# Patient Record
Sex: FEMALE | Race: WHITE | Employment: FULL TIME | URBAN - METROPOLITAN AREA
[De-identification: names, ages, dates, MRNs, and addresses within clinical notes are randomized per-mention and may not be internally consistent; named-entity substitution may affect disease eponyms.]

---

## 2017-01-30 PROCEDURE — 87086 URINE CULTURE/COLONY COUNT: CPT | Performed by: INTERNAL MEDICINE

## 2017-02-03 RX ORDER — GABAPENTIN 300 MG/1
CAPSULE ORAL
Qty: 120 CAPSULE | Refills: 2 | Status: SHIPPED | OUTPATIENT
Start: 2017-02-03 | End: 2017-05-04

## 2017-02-16 ENCOUNTER — LAB ENCOUNTER (OUTPATIENT)
Dept: LAB | Facility: HOSPITAL | Age: 47
End: 2017-02-16
Attending: INTERNAL MEDICINE
Payer: COMMERCIAL

## 2017-02-16 DIAGNOSIS — N18.30 CHRONIC KIDNEY DISEASE, STAGE III (MODERATE) (HCC): Primary | ICD-10-CM

## 2017-02-16 LAB
25-HYDROXYVITAMIN D (TOTAL): 38.4 NG/ML (ref 30–100)
ALBUMIN SERPL-MCNC: 3.8 G/DL (ref 3.5–4.8)
ALP LIVER SERPL-CCNC: 66 U/L (ref 39–100)
ALT SERPL-CCNC: 33 U/L (ref 14–54)
AST SERPL-CCNC: 27 U/L (ref 15–41)
BASOPHILS # BLD AUTO: 0.06 X10(3) UL (ref 0–0.1)
BASOPHILS NFR BLD AUTO: 1 %
BILIRUB SERPL-MCNC: 1.3 MG/DL (ref 0.1–2)
BUN BLD-MCNC: 34 MG/DL (ref 8–20)
CALCIUM BLD-MCNC: 10 MG/DL (ref 8.3–10.3)
CHLORIDE: 99 MMOL/L (ref 101–111)
CO2: 30 MMOL/L (ref 22–32)
CREAT BLD-MCNC: 1.9 MG/DL (ref 0.55–1.02)
EOSINOPHIL # BLD AUTO: 0.26 X10(3) UL (ref 0–0.3)
EOSINOPHIL NFR BLD AUTO: 4.4 %
ERYTHROCYTE [DISTWIDTH] IN BLOOD BY AUTOMATED COUNT: 15.4 % (ref 11.5–16)
GLUCOSE BLD-MCNC: 202 MG/DL (ref 70–99)
HCT VFR BLD AUTO: 38.2 % (ref 34–50)
HGB BLD-MCNC: 12 G/DL (ref 12–16)
IMMATURE GRANULOCYTE COUNT: 0.12 X10(3) UL (ref 0–1)
IMMATURE GRANULOCYTE RATIO %: 2 %
LYMPHOCYTES # BLD AUTO: 1.05 X10(3) UL (ref 0.9–4)
LYMPHOCYTES NFR BLD AUTO: 17.9 %
M PROTEIN MFR SERPL ELPH: 9 G/DL (ref 6.1–8.3)
MCH RBC QN AUTO: 30.2 PG (ref 27–33.2)
MCHC RBC AUTO-ENTMCNC: 31.4 G/DL (ref 31–37)
MCV RBC AUTO: 96 FL (ref 81–100)
MONOCYTES # BLD AUTO: 0.53 X10(3) UL (ref 0.1–0.6)
MONOCYTES NFR BLD AUTO: 9 %
NEUTROPHIL ABS PRELIM: 3.84 X10 (3) UL (ref 1.3–6.7)
NEUTROPHILS # BLD AUTO: 3.84 X10(3) UL (ref 1.3–6.7)
NEUTROPHILS NFR BLD AUTO: 65.7 %
PHOSPHATE SERPL-MCNC: 3.2 MG/DL (ref 2.5–4.9)
PLATELET # BLD AUTO: 285 10(3)UL (ref 150–450)
POTASSIUM SERPL-SCNC: 4 MMOL/L (ref 3.6–5.1)
PTH-INTACT SERPL-MCNC: 16.9 PG/ML (ref 11.1–79.5)
RBC # BLD AUTO: 3.98 X10(6)UL (ref 3.8–5.1)
RED CELL DISTRIBUTION WIDTH-SD: 53.1 FL (ref 35.1–46.3)
SODIUM SERPL-SCNC: 138 MMOL/L (ref 136–144)
URIC ACID: 5.1 MG/DL (ref 2.4–8)
WBC # BLD AUTO: 5.9 X10(3) UL (ref 4–13)

## 2017-02-16 PROCEDURE — 85025 COMPLETE CBC W/AUTO DIFF WBC: CPT

## 2017-02-16 PROCEDURE — 84550 ASSAY OF BLOOD/URIC ACID: CPT

## 2017-02-16 PROCEDURE — 82330 ASSAY OF CALCIUM: CPT

## 2017-02-16 PROCEDURE — 82306 VITAMIN D 25 HYDROXY: CPT

## 2017-02-16 PROCEDURE — 82652 VIT D 1 25-DIHYDROXY: CPT

## 2017-02-16 PROCEDURE — 83970 ASSAY OF PARATHORMONE: CPT

## 2017-02-16 PROCEDURE — 84100 ASSAY OF PHOSPHORUS: CPT

## 2017-02-16 PROCEDURE — 80053 COMPREHEN METABOLIC PANEL: CPT

## 2017-02-16 PROCEDURE — 36415 COLL VENOUS BLD VENIPUNCTURE: CPT

## 2017-02-18 LAB — 1,25-DIHYDROXYVITAMIN D: 31.2 PG/ML

## 2017-02-19 LAB
CALCIUM IONIZED PH 7.4: 1.37 MMOL/L
CALCIUM IONIZED, SERUM: 1.39 MMOL/L

## 2017-02-27 ENCOUNTER — CHARTING TRANS (OUTPATIENT)
Dept: URGENT CARE | Age: 47
End: 2017-02-27

## 2017-02-27 ASSESSMENT — PAIN SCALES - GENERAL: PAINLEVEL_OUTOF10: 4

## 2017-03-28 PROCEDURE — 82330 ASSAY OF CALCIUM: CPT | Performed by: INTERNAL MEDICINE

## 2017-03-29 PROCEDURE — 87086 URINE CULTURE/COLONY COUNT: CPT | Performed by: PHYSICIAN ASSISTANT

## 2017-04-03 DIAGNOSIS — C50.811 MALIGNANT NEOPLASM OF OVERLAPPING SITES OF RIGHT FEMALE BREAST (HCC): Primary | ICD-10-CM

## 2017-04-03 RX ORDER — EXEMESTANE 25 MG/1
25 TABLET ORAL DAILY
Qty: 90 TABLET | Refills: 3 | Status: SHIPPED | OUTPATIENT
Start: 2017-04-03 | End: 2017-07-20 | Stop reason: ALTCHOICE

## 2017-04-24 ENCOUNTER — OFFICE VISIT (OUTPATIENT)
Dept: HEMATOLOGY/ONCOLOGY | Facility: HOSPITAL | Age: 47
End: 2017-04-24
Attending: INTERNAL MEDICINE
Payer: COMMERCIAL

## 2017-04-24 VITALS
BODY MASS INDEX: 48.82 KG/M2 | WEIGHT: 293 LBS | TEMPERATURE: 99 F | DIASTOLIC BLOOD PRESSURE: 89 MMHG | RESPIRATION RATE: 18 BRPM | OXYGEN SATURATION: 94 % | SYSTOLIC BLOOD PRESSURE: 147 MMHG | HEIGHT: 65 IN | HEART RATE: 101 BPM

## 2017-04-24 DIAGNOSIS — C50.811 MALIGNANT NEOPLASM OF OVERLAPPING SITES OF RIGHT BREAST IN FEMALE, ESTROGEN RECEPTOR POSITIVE (HCC): ICD-10-CM

## 2017-04-24 DIAGNOSIS — R39.9 URINARY TRACT INFECTION SYMPTOMS: Primary | ICD-10-CM

## 2017-04-24 DIAGNOSIS — Z17.0 MALIGNANT NEOPLASM OF OVERLAPPING SITES OF RIGHT BREAST IN FEMALE, ESTROGEN RECEPTOR POSITIVE (HCC): ICD-10-CM

## 2017-04-24 DIAGNOSIS — G62.9 NEUROPATHY: ICD-10-CM

## 2017-04-24 PROCEDURE — 99214 OFFICE O/P EST MOD 30 MIN: CPT | Performed by: INTERNAL MEDICINE

## 2017-04-24 RX ORDER — CIPROFLOXACIN 500 MG/1
500 TABLET, FILM COATED ORAL 2 TIMES DAILY
Qty: 20 TABLET | Refills: 3 | Status: SHIPPED | OUTPATIENT
Start: 2017-04-24 | End: 2017-05-04

## 2017-04-24 NOTE — PROGRESS NOTES
Cancer Center Progress Note    Patient Name: Mulu Alvarez   YOB: 1970   Medical Record Number: [id number]   CSN: [id number]   Attending Physician: Matt Nguyen M.D.    Referring Physician: Elsa Hill MD      Date of Visit: 4/24/2017 METFORMIN  MG Oral Tab, TAKE 1 TABLET BY MOUTH 2 TIMES DAILY. , Disp: 60 tablet, Rfl: 1  •  exemestane 25 MG Oral Tab, Take 1 tablet (25 mg total) by mouth daily. , Disp: 90 tablet, Rfl: 3  •  PIOGLITAZONE HCL 45 MG Oral Tab, TAKE 1 TABLET BY MOUTH ON during Chemo---seeing Dr. Tish Georges   • Anemia of chronic disease    • Diabetes mellitus ([facility] [address]      on meds   • Kidney disease      chronic renal disease, monitored by Dr. Christen Layne [facility])   • Neuropathy [facility])      s/p Chemo---in feet   • Personal history of a Social History Main Topics   Smoking status: Never Smoker     Smokeless tobacco: Never Used    Alcohol Use: Yes  0.0 oz/week    0 Standard drinks or equivalent per week         Comment: occasionally    Drug Use: No    Sexual Activity: Not on file   Not DIGITAL DIAG RIGHT W/ CAD (CPT=/49587), 1/21/2014, 10:23.  BEA LOZANO JORGITO 2D+3D DIAG [facility] W/CAD RIGHT (CPT=/41421/87928), 6/08/2015, 8:28.  BEA LOZANO JORGITO 2D+3D DIAG [facility] W/CAD BILAT (CPT=/95810/54045), 12/22/2014, 8:07.  BEA LOZANO currently in keeping with    history of surgical excision. A postsurgical seroma is seen in that area measuring up to 2.2 x 1.8 x 0.7 cm.      BIRADS Code 2: BENIGN FINDING.             RECOMMENDATIONS:     FOLLOW-UP: BILATERAL BREASTS.  One year mammograph the  prior examination. There are tiny nonobstructing right calyceal calculi. A calculus within the  interpole right kidney measures 1-2 mm (image 84, series 4). A 2 mm calculus is noted within the  lower pole right kidney (image 96).  The previously seen c g/dL 3.5   BREAST CARCINOMA Ag (ZN1604) Latest Ref Range: <38.0 u/mL 20.7        Ref. Range 4/24/2017 10:44   WBC Latest Ref Range: 4.0-13.0 x10(3) uL 5.5   Hemoglobin Latest Ref Range: 12.0-16.0 g/dL 11.4 (L)   Hematocrit Latest Ref Range: 34.0-50.0 % 35. today and discussed whether we should empirically start her on an antibiotic but do want her to touch base with her PCP and urologist and ask for further guidance.  Recurrent UTIs can be a problem with menopause and replacement topical/intravaginal estrogen

## 2017-04-25 ENCOUNTER — TELEPHONE (OUTPATIENT)
Dept: HEMATOLOGY/ONCOLOGY | Facility: HOSPITAL | Age: 47
End: 2017-04-25

## 2017-04-25 RX ORDER — FLUCONAZOLE 100 MG/1
TABLET ORAL
Qty: 8 TABLET | Refills: 0 | Status: SHIPPED | OUTPATIENT
Start: 2017-04-25 | End: 2018-01-17 | Stop reason: ALTCHOICE

## 2017-04-25 RX ORDER — TAMOXIFEN CITRATE 20 MG/1
20 TABLET ORAL DAILY
Qty: 30 TABLET | Refills: 11 | Status: SHIPPED | OUTPATIENT
Start: 2017-04-25 | End: 2018-04-11

## 2017-05-04 RX ORDER — GABAPENTIN 300 MG/1
CAPSULE ORAL
Qty: 120 CAPSULE | Refills: 2 | Status: SHIPPED | OUTPATIENT
Start: 2017-05-04 | End: 2017-07-20 | Stop reason: ALTCHOICE

## 2017-05-10 PROCEDURE — 87086 URINE CULTURE/COLONY COUNT: CPT | Performed by: PHYSICIAN ASSISTANT

## 2017-06-09 PROCEDURE — 81001 URINALYSIS AUTO W/SCOPE: CPT | Performed by: UROLOGY

## 2017-06-09 PROCEDURE — 87086 URINE CULTURE/COLONY COUNT: CPT | Performed by: UROLOGY

## 2017-06-28 PROCEDURE — 87086 URINE CULTURE/COLONY COUNT: CPT | Performed by: UROLOGY

## 2017-08-21 RX ORDER — GABAPENTIN 300 MG/1
CAPSULE ORAL
Qty: 120 CAPSULE | Refills: 2 | Status: SHIPPED | OUTPATIENT
Start: 2017-08-21 | End: 2017-11-10

## 2017-08-23 PROCEDURE — 88108 CYTOPATH CONCENTRATE TECH: CPT | Performed by: UROLOGY

## 2017-08-23 PROCEDURE — 87086 URINE CULTURE/COLONY COUNT: CPT | Performed by: UROLOGY

## 2017-09-19 PROBLEM — S92.351D: Status: ACTIVE | Noted: 2017-09-19

## 2017-10-20 ENCOUNTER — OFFICE VISIT (OUTPATIENT)
Dept: HEMATOLOGY/ONCOLOGY | Facility: HOSPITAL | Age: 47
End: 2017-10-20
Attending: INTERNAL MEDICINE
Payer: COMMERCIAL

## 2017-10-20 VITALS
SYSTOLIC BLOOD PRESSURE: 138 MMHG | RESPIRATION RATE: 20 BRPM | HEART RATE: 108 BPM | BODY MASS INDEX: 48.82 KG/M2 | DIASTOLIC BLOOD PRESSURE: 78 MMHG | OXYGEN SATURATION: 96 % | TEMPERATURE: 98 F | HEIGHT: 65 IN | WEIGHT: 293 LBS

## 2017-10-20 DIAGNOSIS — G62.9 NEUROPATHY: Primary | ICD-10-CM

## 2017-10-20 DIAGNOSIS — D63.1 ANEMIA OF RENAL DISEASE: ICD-10-CM

## 2017-10-20 DIAGNOSIS — C50.811 MALIGNANT NEOPLASM OF OVERLAPPING SITES OF RIGHT BREAST IN FEMALE, ESTROGEN RECEPTOR POSITIVE (HCC): ICD-10-CM

## 2017-10-20 DIAGNOSIS — N18.9 ANEMIA OF RENAL DISEASE: ICD-10-CM

## 2017-10-20 DIAGNOSIS — Z17.0 MALIGNANT NEOPLASM OF OVERLAPPING SITES OF RIGHT BREAST IN FEMALE, ESTROGEN RECEPTOR POSITIVE (HCC): ICD-10-CM

## 2017-10-20 PROCEDURE — 99213 OFFICE O/P EST LOW 20 MIN: CPT | Performed by: INTERNAL MEDICINE

## 2017-10-20 NOTE — PROGRESS NOTES
Cancer Center Progress Note    Patient Name: Norberto Leo   YOB: 1970   Medical Record Number: [id number]   CSN: [id number]   Attending Physician: Marion Feliz M.D.    Referring Physician: Mora Jacobsen MD      Date of Visit: 10/20/2017 change in her bowel habits, headaches, dizziness or visual symptoms.        Current Medications:    Current Outpatient Prescriptions:   •  ALPRAZolam 0.5 MG Oral Tab, Take 1 tablet (0.5 mg total) by mouth daily as needed for Sleep., Disp: 20 tablet, Rfl: 0 daily., Disp: 30 tablet, Rfl: 11  •  fluconazole 100 MG Oral Tab, Two tablets on the first day and then one tablet daily. , Disp: 8 tablet, Rfl: 0  •  allopurinol 100 MG Oral Tab, Take 2 tablets (200 mg total) by mouth once daily. , Disp: 30 tablet, Rfl: 1 CHOLECYSTECTOMY  10/10/16: CYSTOSCOPY,URETEROSCOPY,LITHOTRIPSY Right      Comment: R URS, laser litho, stone extrax, stent, Dr. Fabricio Levin  last was around 2008: LITHOTRIPSY Right      Comment: lithotripsy--x5, Dr. Phyllis Cruz [facility])  2/20/14 sensitivity.   Glipizide               Rash  Levaquin [Levofloxa*    Pain    Comment:Joint pain  Lipitor [Atorvastat*    Myalgia  Pravachol [Pravasta*    Myalgia  Sulfa Antibiotics       Nausea and vomiting  Zocor [Simvastatin]     Myalgia     Review of Sys carcinoma with radiation chemotherapy in February 2014.  No current complaints      VIEWS OBTAINED:  Diagnostic views of both breasts were obtained.     Standard 2D and additional multiplane thin sections of the breasts were obtained for the purpose of Matt patient's information has been entered into a reminder system with a target due date for the next mammogram.              Dictated by: Max Pagan MD on 12/06/2016 at 15:40        Approved by: Max Pagan MD          Laboratory:   Ref.  Range 10/2 0.10 - 0.60 x10(3) uL 0.40   Eosinophils Absolute Latest Ref Range: 0.00 - 0.30 x10(3) uL 0.12   Basophils Absolute Latest Ref Range: 0.00 - 0.10 x10(3) uL 0.02   Immature Granulocyte Absolute Latest Ref Range: 0.00 - 1.00 x10(3) uL 0.03   Neutrophils % La

## 2017-10-23 ENCOUNTER — APPOINTMENT (OUTPATIENT)
Dept: HEMATOLOGY/ONCOLOGY | Facility: HOSPITAL | Age: 47
End: 2017-10-23
Attending: INTERNAL MEDICINE
Payer: COMMERCIAL

## 2017-11-10 RX ORDER — GABAPENTIN 300 MG/1
CAPSULE ORAL
Qty: 120 CAPSULE | Refills: 2 | Status: SHIPPED | OUTPATIENT
Start: 2017-11-10 | End: 2018-02-13

## 2018-01-17 ENCOUNTER — HOSPITAL ENCOUNTER (OUTPATIENT)
Dept: MAMMOGRAPHY | Facility: HOSPITAL | Age: 48
Discharge: HOME | End: 2018-01-17
Attending: INTERNAL MEDICINE
Payer: COMMERCIAL

## 2018-01-17 DIAGNOSIS — C50.811 MALIGNANT NEOPLASM OF OVERLAPPING SITES OF RIGHT BREAST IN FEMALE, ESTROGEN RECEPTOR POSITIVE (HCC): ICD-10-CM

## 2018-01-17 DIAGNOSIS — Z17.0 MALIGNANT NEOPLASM OF OVERLAPPING SITES OF RIGHT BREAST IN FEMALE, ESTROGEN RECEPTOR POSITIVE (HCC): ICD-10-CM

## 2018-01-17 PROBLEM — S92.351D: Status: RESOLVED | Noted: 2017-09-19 | Resolved: 2018-01-17

## 2018-01-17 PROCEDURE — 77066 DX MAMMO INCL CAD BI: CPT | Performed by: INTERNAL MEDICINE

## 2018-01-17 PROCEDURE — 76642 ULTRASOUND BREAST LIMITED: CPT | Performed by: INTERNAL MEDICINE

## 2018-01-17 PROCEDURE — 77062 BREAST TOMOSYNTHESIS BI: CPT | Performed by: INTERNAL MEDICINE

## 2018-02-13 RX ORDER — GABAPENTIN 300 MG/1
CAPSULE ORAL
Qty: 120 CAPSULE | Refills: 2 | Status: SHIPPED | OUTPATIENT
Start: 2018-02-13 | End: 2018-05-12

## 2018-03-21 ENCOUNTER — CHARTING TRANS (OUTPATIENT)
Dept: OTHER | Age: 48
End: 2018-03-21

## 2018-03-21 ASSESSMENT — PAIN SCALES - GENERAL: PAINLEVEL_OUTOF10: 4

## 2018-04-11 RX ORDER — TAMOXIFEN CITRATE 20 MG/1
TABLET ORAL
Qty: 30 TABLET | Refills: 11 | Status: SHIPPED | OUTPATIENT
Start: 2018-04-11 | End: 2019-03-31

## 2018-04-19 ENCOUNTER — OFFICE VISIT (OUTPATIENT)
Dept: HEMATOLOGY/ONCOLOGY | Facility: HOSPITAL | Age: 48
End: 2018-04-19
Attending: INTERNAL MEDICINE
Payer: COMMERCIAL

## 2018-04-19 VITALS
BODY MASS INDEX: 48.82 KG/M2 | RESPIRATION RATE: 20 BRPM | WEIGHT: 293 LBS | HEART RATE: 103 BPM | TEMPERATURE: 98 F | HEIGHT: 65 IN | OXYGEN SATURATION: 97 %

## 2018-04-19 DIAGNOSIS — Z17.0 MALIGNANT NEOPLASM OF OVERLAPPING SITES OF RIGHT BREAST IN FEMALE, ESTROGEN RECEPTOR POSITIVE (HCC): ICD-10-CM

## 2018-04-19 DIAGNOSIS — N95.1 MENOPAUSAL VASOMOTOR SYNDROME: Primary | ICD-10-CM

## 2018-04-19 DIAGNOSIS — C50.811 MALIGNANT NEOPLASM OF OVERLAPPING SITES OF RIGHT BREAST IN FEMALE, ESTROGEN RECEPTOR POSITIVE (HCC): ICD-10-CM

## 2018-04-19 DIAGNOSIS — D63.1 ANEMIA OF RENAL DISEASE: ICD-10-CM

## 2018-04-19 DIAGNOSIS — N18.9 ANEMIA OF RENAL DISEASE: ICD-10-CM

## 2018-04-19 PROCEDURE — 99213 OFFICE O/P EST LOW 20 MIN: CPT | Performed by: INTERNAL MEDICINE

## 2018-04-19 NOTE — PROGRESS NOTES
Cancer Center Progress Note    Patient Name: Almita Larson   YOB: 1970   Medical Record Number: [id number]   CSN: [id number]   Attending Physician: Mago Delarosa M.D.    Referring Physician: Karla Sarmiento MD      Date of Visit: 4/19/2018 TABLET BY MOUTH ONCE DAILY. , Disp: 90 tablet, Rfl: 0  •  JANUVIA 50 MG Oral Tab, TAKE 1 TABLET (50 MG TOTAL) BY MOUTH ONCE DAILY. , Disp: 30 tablet, Rfl: 3  •  hydrochlorothiazide 25 MG Oral Tab, Take 25 mg by mouth once daily. , Disp: , Rfl: 2  •  GABAPENTI 2010-   • Diabetes mellitus ([facility] [address]     on meds   • Elevated serum creatinine     Hold ACE-I during Chemo---seeing Dr. Annabelle Treviño   • Exposure to radiation     last radiation treatment:9/2014   • HYPERLIPIDEMIA     tricor 145 mg was about the same as Tricor 48 History:    Social History  Social History   Marital status:   Spouse name: N/A    Years of education: N/A  Number of children: N/A     Occupational History  None on file     Social History Main Topics   Smoking status: Never Smoker    Smokeless tob (CPT=77066/79433)     COMPARISON:  MARTA , US BREAST LEFT LIMITED ([facility] SAME DAY US)(CPT=76642), 1/17/2018, 15:41. [facility] , [facility] JORGITO 2D+3D DIAG [facility] W/CAD BILAT (CPT=/28139/54573), 12/06/2016, 15:38.      INDICATIONS:  Z17.0 Estrogen receptor positive (H) 11.5 - 16.0 %   RDW-SD 56.1 (H) 35.1 - 46.3 fL   Neutrophil Absolute Prelim 2.52 1.30 - 6.70 x10 (3) uL   Neutrophil Absolute 2.52 1.30 - 6.70 x10(3) uL   Lymphocyte Absolute 0.52 (L) 0.90 - 4.00 x10(3) uL   Monocyte Absolute 0.44 0.10 - 1.00 x10(3) uL

## 2018-04-20 ENCOUNTER — APPOINTMENT (OUTPATIENT)
Dept: HEMATOLOGY/ONCOLOGY | Facility: HOSPITAL | Age: 48
End: 2018-04-20
Attending: INTERNAL MEDICINE
Payer: COMMERCIAL

## 2018-05-14 RX ORDER — GABAPENTIN 300 MG/1
CAPSULE ORAL
Qty: 120 CAPSULE | Refills: 5 | Status: SHIPPED | OUTPATIENT
Start: 2018-05-14 | End: 2018-11-08

## 2018-10-18 ENCOUNTER — OFFICE VISIT (OUTPATIENT)
Dept: HEMATOLOGY/ONCOLOGY | Facility: HOSPITAL | Age: 48
End: 2018-10-18
Attending: INTERNAL MEDICINE
Payer: COMMERCIAL

## 2018-10-18 VITALS
SYSTOLIC BLOOD PRESSURE: 154 MMHG | HEART RATE: 98 BPM | RESPIRATION RATE: 20 BRPM | BODY MASS INDEX: 56 KG/M2 | DIASTOLIC BLOOD PRESSURE: 87 MMHG | OXYGEN SATURATION: 94 % | WEIGHT: 293 LBS

## 2018-10-18 DIAGNOSIS — N18.9 ANEMIA OF RENAL DISEASE: ICD-10-CM

## 2018-10-18 DIAGNOSIS — D69.6 THROMBOCYTOPENIA (HCC): ICD-10-CM

## 2018-10-18 DIAGNOSIS — Z17.0 MALIGNANT NEOPLASM OF OVERLAPPING SITES OF RIGHT BREAST IN FEMALE, ESTROGEN RECEPTOR POSITIVE (HCC): ICD-10-CM

## 2018-10-18 DIAGNOSIS — C50.811 MALIGNANT NEOPLASM OF OVERLAPPING SITES OF RIGHT BREAST IN FEMALE, ESTROGEN RECEPTOR POSITIVE (HCC): ICD-10-CM

## 2018-10-18 DIAGNOSIS — D63.1 ANEMIA OF RENAL DISEASE: ICD-10-CM

## 2018-10-18 DIAGNOSIS — G62.9 NEUROPATHY: Primary | ICD-10-CM

## 2018-10-18 PROCEDURE — 99213 OFFICE O/P EST LOW 20 MIN: CPT | Performed by: INTERNAL MEDICINE

## 2018-10-18 NOTE — PROGRESS NOTES
Cancer Center Progress Note    Patient Name: Jose D Zacarias   YOB: 1970   Medical Record Number: [id number]   CSN: [id number]   Attending Physician: Jeremy Villalobos M.D.    Referring Physician: Costa Zhang MD      Date of Visit: 10/18/2018 tablet, Rfl: 0  •  nystatin [id number] UNIT/GM External Cream, APPLY TO AFFECTED AREA 2-3X/DAY AS NEEDED FOR INFECTION, Disp: 30 g, Rfl: 1  •  METFORMIN  MG Oral Tab, TAKE 1 TABLET BY MOUTH 2 TIMES DAILY. , Disp: 60 tablet, Rfl: 2  •  fluconazole (DIFLUC CYmbalta) 2012--seeing therapist   • DIABETES     diagnosed 2010-   • Diabetes mellitus ([facility] [address]     on meds   • Elevated serum creatinine     Hold ACE-I during Chemo---seeing Dr. Tatum Dawson   • Exposure to radiation     last radiation treatment:9/2014   • HYPER Multiple0  Live Births0     Comment: Menarche: 10-9 y/o  OCP use and fertility use   LMP: 4/21/14 d/t chemo      Psychosocial History:  Social History    Socioeconomic History      Marital status:       Spouse name: Not on file      Number of child intact. PERRL. Oropharynx clear. No thrush  Neck: No JVD. No palpable lymphadenopathy. Neck is supple. Lymphatics: There is no palpable lymphadenopathy   Chest: Clear to auscultation. Heart: Regular rate and rhythm.    Abdomen: Soft, non tender with good mammogram.           Dictated by: Jill Pierre MD on 1/17/2018 at 16:02       Approved by: Jill Pierre MD              Laboratory:  Lab Results   Component Value Date    WBC 5.1 10/18/2018    RBC 3.66 (L) 10/18/2018    HGB 10.8 (L) 10/18/2018    HCT 3 months. Emotional Well Being:  I have assessed the patient's emotional well-being and any concerns about anxiety or depression. We discussed issues of distress, coping difficulties and social support systems. No active problems.      Lisa Pickard MD

## 2018-11-08 RX ORDER — GABAPENTIN 300 MG/1
CAPSULE ORAL
Qty: 120 CAPSULE | Refills: 5 | Status: SHIPPED | OUTPATIENT
Start: 2018-11-08 | End: 2019-05-05

## 2018-11-28 VITALS
OXYGEN SATURATION: 97 % | HEART RATE: 100 BPM | DIASTOLIC BLOOD PRESSURE: 77 MMHG | SYSTOLIC BLOOD PRESSURE: 125 MMHG | RESPIRATION RATE: 20 BRPM | TEMPERATURE: 97 F

## 2018-12-10 DIAGNOSIS — C50.919 BREAST CANCER (HCC): Primary | ICD-10-CM

## 2019-01-18 ENCOUNTER — HOSPITAL ENCOUNTER (OUTPATIENT)
Dept: MAMMOGRAPHY | Facility: HOSPITAL | Age: 49
Discharge: HOME | End: 2019-01-18
Attending: INTERNAL MEDICINE
Payer: COMMERCIAL

## 2019-01-18 DIAGNOSIS — C50.919 BREAST CANCER (HCC): ICD-10-CM

## 2019-01-18 PROCEDURE — 77062 BREAST TOMOSYNTHESIS BI: CPT | Performed by: INTERNAL MEDICINE

## 2019-01-18 PROCEDURE — 77066 DX MAMMO INCL CAD BI: CPT | Performed by: INTERNAL MEDICINE

## 2019-03-06 VITALS
TEMPERATURE: 98.2 F | HEART RATE: 88 BPM | OXYGEN SATURATION: 96 % | RESPIRATION RATE: 20 BRPM | SYSTOLIC BLOOD PRESSURE: 120 MMHG | DIASTOLIC BLOOD PRESSURE: 59 MMHG

## 2019-04-01 RX ORDER — TAMOXIFEN CITRATE 20 MG/1
TABLET ORAL
Qty: 30 TABLET | Refills: 11 | Status: SHIPPED | OUTPATIENT
Start: 2019-04-01 | End: 2020-04-06

## 2019-04-09 PROCEDURE — 87086 URINE CULTURE/COLONY COUNT: CPT | Performed by: PHYSICIAN ASSISTANT

## 2019-04-09 PROCEDURE — 81015 MICROSCOPIC EXAM OF URINE: CPT | Performed by: PHYSICIAN ASSISTANT

## 2019-04-15 ENCOUNTER — OFFICE VISIT (OUTPATIENT)
Dept: HEMATOLOGY/ONCOLOGY | Facility: HOSPITAL | Age: 49
End: 2019-04-15
Attending: INTERNAL MEDICINE
Payer: COMMERCIAL

## 2019-04-15 VITALS
TEMPERATURE: 99 F | BODY MASS INDEX: 48.82 KG/M2 | RESPIRATION RATE: 20 BRPM | OXYGEN SATURATION: 95 % | SYSTOLIC BLOOD PRESSURE: 157 MMHG | HEART RATE: 99 BPM | DIASTOLIC BLOOD PRESSURE: 81 MMHG | WEIGHT: 293 LBS | HEIGHT: 65 IN

## 2019-04-15 DIAGNOSIS — D63.1 ANEMIA OF RENAL DISEASE: Primary | ICD-10-CM

## 2019-04-15 DIAGNOSIS — C50.811 MALIGNANT NEOPLASM OF OVERLAPPING SITES OF RIGHT BREAST IN FEMALE, ESTROGEN RECEPTOR POSITIVE (HCC): ICD-10-CM

## 2019-04-15 DIAGNOSIS — N18.9 ANEMIA OF RENAL DISEASE: Primary | ICD-10-CM

## 2019-04-15 DIAGNOSIS — G62.9 NEUROPATHY: ICD-10-CM

## 2019-04-15 DIAGNOSIS — Z17.0 MALIGNANT NEOPLASM OF OVERLAPPING SITES OF RIGHT BREAST IN FEMALE, ESTROGEN RECEPTOR POSITIVE (HCC): ICD-10-CM

## 2019-04-15 DIAGNOSIS — D69.6 THROMBOCYTOPENIA (HCC): ICD-10-CM

## 2019-04-15 PROCEDURE — 99213 OFFICE O/P EST LOW 20 MIN: CPT | Performed by: INTERNAL MEDICINE

## 2019-04-15 NOTE — PROGRESS NOTES
Cancer Center Progress Note    Patient Name: Norberto Leo   YOB: 1970   Medical Record Number: [id number]   CSN: [id number]   Attending Physician: Marion Feliz M.D.    Referring Physician: Mora Jacobsen MD      Date of Visit: 4/15/2019 Rfl: 11  •  ALPRAZolam 0.5 MG Oral Tab, Take 1 tablet (0.5 mg total) by mouth daily as needed for Sleep., Disp: 30 tablet, Rfl: 0  •  insulin glargine (LANTUS) 100 UNIT/ML Subcutaneous Solution, Inject 40 units at night, Disp: 3 vial, Rfl: 0  •  METFORMIN DIABETES     diagnosed 2010-   • [facility]     on meds   • Elevated serum creatinine     Hold ACE-I during Chemo---seeing Dr. Luis Cruz   • Exposure to radiation     last radiation treatment:9/2014   • HYPERLIPIDEMIA     tricor 145 mg was about [age] y/o  OCP use and fertility use   LMP: 4/21/14 d/t chemo    Psychosocial History:  Social History    Socioeconomic History      Marital status:       Spouse name: Not on file      Number of children: Not on file      Years of education: Not on Comment:Joint pain  Lipitor [Atorvastat*    MYALGIA  Pravachol [Pravasta*    MYALGIA  Sulfa Antibiotics       NAUSEA AND VOMITING  Zocor [Simvastatin]     MYALGIA     Review of Systems:  A 14-point ROS was done with pertinent positives and negative per the COMPOSITION:   Scattered fibroglandular densities (25-50% glandular). FINDINGS:  Post lumpectomy and radiation changes right breast.  Stable parenchymal pattern both breasts.   There are no suspicious calcifications in either breast.  No spiculated mass 0.5 04/15/2019    TP 8.3 (H) 04/15/2019    ALB 3.5 04/15/2019    GLOBULIN 4.8 (H) 04/15/2019    AGRATIO 1.5 09/09/2015     04/15/2019    K 3.9 04/15/2019     04/15/2019    CO2 28.0 04/15/2019       Impression and Plan:  From a malignancy standp

## 2019-05-06 RX ORDER — GABAPENTIN 300 MG/1
CAPSULE ORAL
Qty: 120 CAPSULE | Refills: 5 | Status: SHIPPED | OUTPATIENT
Start: 2019-05-06 | End: 2019-10-23

## 2019-10-14 ENCOUNTER — APPOINTMENT (OUTPATIENT)
Dept: HEMATOLOGY/ONCOLOGY | Facility: HOSPITAL | Age: 49
End: 2019-10-14
Attending: INTERNAL MEDICINE
Payer: COMMERCIAL

## 2019-10-17 ENCOUNTER — OFFICE VISIT (OUTPATIENT)
Dept: HEMATOLOGY/ONCOLOGY | Facility: HOSPITAL | Age: 49
End: 2019-10-17
Attending: INTERNAL MEDICINE
Payer: COMMERCIAL

## 2019-10-17 VITALS
RESPIRATION RATE: 18 BRPM | DIASTOLIC BLOOD PRESSURE: 94 MMHG | SYSTOLIC BLOOD PRESSURE: 178 MMHG | WEIGHT: 293 LBS | TEMPERATURE: 99 F | BODY MASS INDEX: 48.82 KG/M2 | HEART RATE: 105 BPM | HEIGHT: 65 IN | OXYGEN SATURATION: 95 %

## 2019-10-17 DIAGNOSIS — G62.9 NEUROPATHY: ICD-10-CM

## 2019-10-17 DIAGNOSIS — D69.6 THROMBOCYTOPENIA (HCC): ICD-10-CM

## 2019-10-17 DIAGNOSIS — D63.1 ANEMIA OF RENAL DISEASE: Primary | ICD-10-CM

## 2019-10-17 DIAGNOSIS — N18.9 ANEMIA OF RENAL DISEASE: Primary | ICD-10-CM

## 2019-10-17 DIAGNOSIS — Z17.0 MALIGNANT NEOPLASM OF OVERLAPPING SITES OF RIGHT BREAST IN FEMALE, ESTROGEN RECEPTOR POSITIVE (HCC): ICD-10-CM

## 2019-10-17 DIAGNOSIS — D64.9 NORMOCYTIC ANEMIA: ICD-10-CM

## 2019-10-17 DIAGNOSIS — C50.811 MALIGNANT NEOPLASM OF OVERLAPPING SITES OF RIGHT BREAST IN FEMALE, ESTROGEN RECEPTOR POSITIVE (HCC): ICD-10-CM

## 2019-10-17 PROCEDURE — 99214 OFFICE O/P EST MOD 30 MIN: CPT | Performed by: INTERNAL MEDICINE

## 2019-10-17 NOTE — PROGRESS NOTES
Cancer Center Progress Note    Patient Name: Winnie Conley   YOB: 1970   Medical Record Number: [id number]   CSN: [id number]   Attending Physician: Jayashree Velásquez M.D.    Referring Physician: Aneudy Zaragoza MD      Date of Visit: 10/17/2019 USE WITH INSULIN TWICE DAILY, Disp: 200 each, Rfl: 3  •  METFORMIN  MG Oral Tab, TAKE 1 TABLET BY MOUTH TWICE A DAY, Disp: 180 tablet, Rfl: 0  •  ALLOPURINOL 300 MG Oral Tab, TAKE 1 TABLET BY MOUTH EVERY DAY, Disp: 90 tablet, Rfl: 0  •  PIOGLITAZONE therapist   • DIABETES     diagnosed 2010-   • Diabetes mellitus ([facility] [address]     on meds   • Elevated serum creatinine     Hold ACE-I during Chemo---seeing Dr. Siri Joseph   • Exposure to radiation     last radiation treatment:9/2014   • HYPERLIPIDEMIA     tricor 14 Menarche: 6-9 y/o  OCP use and fertility use   LMP: 4/21/14 d/t chemo    Psychosocial History:  Social History    Socioeconomic History      Marital status:       Spouse name: Not on file      Number of children: Not on file      Years of educatio [Levofloxa*    PAIN    Comment:Joint pain  Lipitor [Atorvastat*    MYALGIA  Pravachol [Pravasta*    MYALGIA  Sulfa Antibiotics       NAUSEA AND VOMITING  Zocor [Simvastatin]     MYALGIA     Review of Systems:  A 14-point ROS was done with pertinent positiv parenchymal pattern both breasts. There are no suspicious calcifications in either breast.  No spiculated masses. Stable mammogram.       =====  CONCLUSION:     BIRADS Code 2: BENIGN FINDING. RECOMMENDATIONS:    FOLLOW-UP: BILATERAL BREASTS.  One ye 10/17/2019     10/17/2019    CO2 30.0 10/17/2019     Lab Component   Component Value Date/Time    Breast Carcinoma Ag [id number] 23.6 10/17/2019 1331         Impression and Plan:  From a malignancy standpoint has been doing well and clinically ALLYN.  Most

## 2019-10-17 NOTE — PATIENT INSTRUCTIONS
For triage nurse: [phone] Monday through Friday 7:30-5:00.  *Please note this is a new phone number*    After hours or weekends for emergent needs:  [phone].      To schedule diagnostic testing: Central Scheduling: [place] [phone]

## 2019-10-23 RX ORDER — GABAPENTIN 300 MG/1
CAPSULE ORAL
Qty: 120 CAPSULE | Refills: 5 | Status: SHIPPED | OUTPATIENT
Start: 2019-10-23 | End: 2020-04-13

## 2020-01-01 ENCOUNTER — MED REC SCAN ONLY (OUTPATIENT)
Dept: ORTHOPEDICS CLINIC | Facility: CLINIC | Age: 50
End: 2020-01-01

## 2020-01-16 ENCOUNTER — TELEPHONE (OUTPATIENT)
Dept: ORTHOPEDICS CLINIC | Facility: CLINIC | Age: 50
End: 2020-01-16

## 2020-01-16 ENCOUNTER — HOSPITAL ENCOUNTER (OUTPATIENT)
Dept: GENERAL RADIOLOGY | Facility: HOSPITAL | Age: 50
Discharge: HOME | End: 2020-01-16
Attending: ORTHOPAEDIC SURGERY
Payer: COMMERCIAL

## 2020-01-16 ENCOUNTER — OFFICE VISIT (OUTPATIENT)
Dept: ORTHOPEDICS CLINIC | Facility: CLINIC | Age: 50
End: 2020-01-16
Payer: COMMERCIAL

## 2020-01-16 VITALS
RESPIRATION RATE: 16 BRPM | OXYGEN SATURATION: 98 % | WEIGHT: 293 LBS | HEIGHT: 65 IN | HEART RATE: 89 BPM | BODY MASS INDEX: 48.82 KG/M2

## 2020-01-16 DIAGNOSIS — S82.841A CLOSED BIMALLEOLAR FRACTURE OF RIGHT ANKLE, INITIAL ENCOUNTER: ICD-10-CM

## 2020-01-16 DIAGNOSIS — S82.841A CLOSED BIMALLEOLAR FRACTURE OF RIGHT ANKLE, INITIAL ENCOUNTER: Primary | ICD-10-CM

## 2020-01-16 PROCEDURE — 99203 OFFICE O/P NEW LOW 30 MIN: CPT | Performed by: ORTHOPAEDIC SURGERY

## 2020-01-16 PROCEDURE — 73610 X-RAY EXAM OF ANKLE: CPT | Performed by: ORTHOPAEDIC SURGERY

## 2020-01-16 RX ORDER — CYANOCOBALAMIN (VITAMIN B-12) 1000 MCG
2 TABLET, EXTENDED RELEASE ORAL 2 TIMES DAILY WITH MEALS
Qty: 180 TABLET | Refills: 0 | Status: SHIPPED | OUTPATIENT
Start: 2020-01-16 | End: 2020-03-01

## 2020-01-16 RX ORDER — TRAMADOL HYDROCHLORIDE 50 MG/1
TABLET ORAL EVERY 6 HOURS PRN
Qty: 40 TABLET | Refills: 0 | Status: SHIPPED | OUTPATIENT
Start: 2020-01-16 | End: 2020-01-17

## 2020-01-16 NOTE — H&P
[facility] New Patient Note    CC: Patient presents with:  Consult: right ankle injury. DOI: 12/31/19-  missed going down steps. was seen at ER in [address]. took norco and ended up with a kidney infection. CAM boot. swelling.   pain gets better as the on meds   • Elevated serum creatinine     Hold ACE-I during Chemo---seeing Dr. Tesha Johnson   • Exposure to radiation     last radiation treatment:9/2014   • HYPERLIPIDEMIA     tricor 145 mg was about the same as Tricor 48 mg   • HYPERTENSION    • Kidney diseas DAILY.  90 tablet 0   • METFORMIN  MG Oral Tab TAKE 1 TABLET BY MOUTH TWICE A  tablet 0   • ALLOPURINOL 300 MG Oral Tab TAKE 1 TABLET BY MOUTH EVERY DAY 90 tablet 0   • GABAPENTIN 300 MG Oral Cap TAKE 2 CAPSULES BY MOUTH TWICE A  capsul pain  Lipitor [Atorvastat*    MYALGIA  Pravachol [Pravasta*    MYALGIA  Sulfa Antibiotics       NAUSEA AND VOMITING  Zocor [Simvastatin]     MYALGIA  Cefdinir                DIARRHEA    Comment:abd pain and dizziness  Family History   Problem Relation Age views. Appears to be tension type failure of both the medial lateral malleoli, with a lateral malleolus fracture below the level of the ankle joint/Wilson a.       Assessment/Plan:  Diagnoses and all orders for this visit:    Closed bimalleolar fracture of she has remained nondisplaced with this immobilization since injury 2-1/2 weeks ago. I did recommend close follow-up to ensure no further displacement. We did order x-rays to be performed when she returns in 1 week.   Additionally, for pain control, maeve

## 2020-01-17 RX ORDER — HYDROCODONE BITARTRATE AND ACETAMINOPHEN 5; 325 MG/1; MG/1
1-2 TABLET ORAL EVERY 6 HOURS PRN
Qty: 20 TABLET | Refills: 0 | Status: SHIPPED | OUTPATIENT
Start: 2020-01-17 | End: 2020-10-19

## 2020-01-23 ENCOUNTER — HOSPITAL ENCOUNTER (OUTPATIENT)
Dept: GENERAL RADIOLOGY | Facility: HOSPITAL | Age: 50
Discharge: HOME | End: 2020-01-23
Attending: ORTHOPAEDIC SURGERY
Payer: COMMERCIAL

## 2020-01-23 ENCOUNTER — OFFICE VISIT (OUTPATIENT)
Dept: ORTHOPEDICS CLINIC | Facility: CLINIC | Age: 50
End: 2020-01-23
Payer: COMMERCIAL

## 2020-01-23 VITALS
RESPIRATION RATE: 16 BRPM | BODY MASS INDEX: 48.82 KG/M2 | HEART RATE: 104 BPM | HEIGHT: 65 IN | OXYGEN SATURATION: 99 % | WEIGHT: 293 LBS

## 2020-01-23 DIAGNOSIS — S82.841A CLOSED BIMALLEOLAR FRACTURE OF RIGHT ANKLE, INITIAL ENCOUNTER: ICD-10-CM

## 2020-01-23 DIAGNOSIS — S82.841D CLOSED BIMALLEOLAR FRACTURE OF RIGHT ANKLE WITH ROUTINE HEALING, SUBSEQUENT ENCOUNTER: Primary | ICD-10-CM

## 2020-01-23 PROCEDURE — 73610 X-RAY EXAM OF ANKLE: CPT | Performed by: ORTHOPAEDIC SURGERY

## 2020-01-23 PROCEDURE — 99212 OFFICE O/P EST SF 10 MIN: CPT | Performed by: ORTHOPAEDIC SURGERY

## 2020-01-23 NOTE — PROGRESS NOTES
[facility] Progress Note    Subjective: Patient returns for repeat x-rays of the right ankle. She does report that her pain is gotten better. She is not taking tramadol as it was not covered by insurance.   She does have Norco, but takes this at mos

## 2020-02-21 ENCOUNTER — HOSPITAL ENCOUNTER (OUTPATIENT)
Dept: GENERAL RADIOLOGY | Facility: HOSPITAL | Age: 50
Discharge: HOME | End: 2020-02-21
Attending: ORTHOPAEDIC SURGERY
Payer: COMMERCIAL

## 2020-02-21 ENCOUNTER — OFFICE VISIT (OUTPATIENT)
Dept: ORTHOPEDICS CLINIC | Facility: CLINIC | Age: 50
End: 2020-02-21
Payer: COMMERCIAL

## 2020-02-21 DIAGNOSIS — S82.841D CLOSED BIMALLEOLAR FRACTURE OF RIGHT ANKLE WITH ROUTINE HEALING, SUBSEQUENT ENCOUNTER: ICD-10-CM

## 2020-02-21 DIAGNOSIS — S82.841D CLOSED BIMALLEOLAR FRACTURE OF RIGHT ANKLE WITH ROUTINE HEALING, SUBSEQUENT ENCOUNTER: Primary | ICD-10-CM

## 2020-02-21 PROCEDURE — 73610 X-RAY EXAM OF ANKLE: CPT | Performed by: ORTHOPAEDIC SURGERY

## 2020-02-21 PROCEDURE — 99213 OFFICE O/P EST LOW 20 MIN: CPT | Performed by: ORTHOPAEDIC SURGERY

## 2020-02-21 NOTE — PROGRESS NOTES
[facility] Progress Note    Subjective: Patient returns 7 weeks post injury from closed bimalleolar right ankle fracture. She reports she is not having pain. She is wearing the boot pretty much all the time.   She states she is trying to remain nonw Surgery

## 2020-03-25 ENCOUNTER — HOSPITAL ENCOUNTER (OUTPATIENT)
Dept: GENERAL RADIOLOGY | Age: 50
Discharge: HOME | End: 2020-03-25
Attending: ORTHOPAEDIC SURGERY
Payer: COMMERCIAL

## 2020-03-25 DIAGNOSIS — S82.841D CLOSED BIMALLEOLAR FRACTURE OF RIGHT ANKLE WITH ROUTINE HEALING, SUBSEQUENT ENCOUNTER: ICD-10-CM

## 2020-03-25 PROCEDURE — 73610 X-RAY EXAM OF ANKLE: CPT | Performed by: ORTHOPAEDIC SURGERY

## 2020-03-27 ENCOUNTER — TELEPHONE (OUTPATIENT)
Dept: ORTHOPEDICS CLINIC | Facility: CLINIC | Age: 50
End: 2020-03-27

## 2020-03-27 DIAGNOSIS — S82.891G CLOSED FRACTURE OF RIGHT ANKLE WITH DELAYED HEALING, SUBSEQUENT ENCOUNTER: Primary | ICD-10-CM

## 2020-03-27 NOTE — TELEPHONE ENCOUNTER
Patient had an appointment today and had her x-ray yesterday.  Please call after 1:30 pm today Friday 3.39

## 2020-03-27 NOTE — TELEPHONE ENCOUNTER
Called patient to discuss ankle x-rays. Advised that we obtain stress views of ankle to assess stability of tibiotalar joint and syndesmosis. She expressed understanding. X-ray order placed, will attempt to schedule sometime for next week.

## 2020-04-01 ENCOUNTER — HOSPITAL ENCOUNTER (OUTPATIENT)
Dept: GENERAL RADIOLOGY | Facility: HOSPITAL | Age: 50
Discharge: HOME | End: 2020-04-01
Attending: ORTHOPAEDIC SURGERY
Payer: COMMERCIAL

## 2020-04-01 ENCOUNTER — TELEPHONE (OUTPATIENT)
Dept: ORTHOPEDICS CLINIC | Facility: CLINIC | Age: 50
End: 2020-04-01

## 2020-04-01 DIAGNOSIS — S82.891G CLOSED FRACTURE OF RIGHT ANKLE WITH DELAYED HEALING, SUBSEQUENT ENCOUNTER: ICD-10-CM

## 2020-04-01 PROCEDURE — 73610 X-RAY EXAM OF ANKLE: CPT | Performed by: ORTHOPAEDIC SURGERY

## 2020-04-01 NOTE — TELEPHONE ENCOUNTER
Assisted patient with ankle x-rays today. Weight bearing mortise, lateral, and internal/external stress views demonstrate stable tibiotalar joint.  Recommend initiating weight bearing in boot, working on ROM/strengthening, gradual weaning out of boot as str

## 2020-04-06 RX ORDER — TAMOXIFEN CITRATE 20 MG/1
TABLET ORAL
Qty: 30 TABLET | Refills: 11 | Status: SHIPPED | OUTPATIENT
Start: 2020-04-06 | End: 2021-04-11

## 2020-04-13 RX ORDER — GABAPENTIN 300 MG/1
600 CAPSULE ORAL 2 TIMES DAILY
Qty: 120 CAPSULE | Refills: 5 | Status: SHIPPED | OUTPATIENT
Start: 2020-04-13 | End: 2020-11-03

## 2020-04-16 ENCOUNTER — APPOINTMENT (OUTPATIENT)
Dept: HEMATOLOGY/ONCOLOGY | Facility: HOSPITAL | Age: 50
End: 2020-04-16
Attending: INTERNAL MEDICINE
Payer: COMMERCIAL

## 2020-05-01 ENCOUNTER — TELEPHONE (OUTPATIENT)
Dept: ORTHOPEDICS CLINIC | Facility: CLINIC | Age: 50
End: 2020-05-01

## 2020-05-01 DIAGNOSIS — S82.891G CLOSED FRACTURE OF RIGHT ANKLE WITH DELAYED HEALING, SUBSEQUENT ENCOUNTER: Primary | ICD-10-CM

## 2020-05-01 NOTE — TELEPHONE ENCOUNTER
Called patient to assess her ankle symptoms. She reports that she has been walking without the boot for the past 2 weeks.   She states that she is not having pain, just swelling, which is worse with leaving the foot and ankle down, as well as at the end of

## 2020-07-16 ENCOUNTER — APPOINTMENT (OUTPATIENT)
Dept: HEMATOLOGY/ONCOLOGY | Facility: HOSPITAL | Age: 50
End: 2020-07-16
Attending: INTERNAL MEDICINE
Payer: COMMERCIAL

## 2020-09-27 ENCOUNTER — HOSPITAL ENCOUNTER (OUTPATIENT)
Age: 50
Discharge: ANOTHER HOSPITAL | End: 2020-09-27
Payer: COMMERCIAL

## 2020-09-27 VITALS
SYSTOLIC BLOOD PRESSURE: 123 MMHG | DIASTOLIC BLOOD PRESSURE: 62 MMHG | TEMPERATURE: 98 F | HEART RATE: 105 BPM | RESPIRATION RATE: 16 BRPM | OXYGEN SATURATION: 96 %

## 2020-09-27 DIAGNOSIS — R09.02 HYPOXIA: Primary | ICD-10-CM

## 2020-09-27 PROCEDURE — 99202 OFFICE O/P NEW SF 15 MIN: CPT | Performed by: PHYSICIAN ASSISTANT

## 2020-09-27 NOTE — ED PROVIDER NOTES
Patient Seen in: [address] [facility] [place], [place]      History   Patient presents with:  Runny Nose  Shortness Of Breath  Headache    Stated Complaint: Sinus Pain and Headache    HPI    54-year-old female.   Moderate medical history including breast cancer MDM        Upon initial evaluation the patient was noted to vacillate between 78 and 82% on room air. Supplemental oxygen was immediately administered. At 4 L, 93% room air. Emergent transport via ambulance to the emergency department.   Cece Núñez

## 2020-10-19 ENCOUNTER — OFFICE VISIT (OUTPATIENT)
Dept: HEMATOLOGY/ONCOLOGY | Facility: HOSPITAL | Age: 50
End: 2020-10-19
Attending: INTERNAL MEDICINE
Payer: COMMERCIAL

## 2020-10-19 VITALS
RESPIRATION RATE: 16 BRPM | SYSTOLIC BLOOD PRESSURE: 116 MMHG | DIASTOLIC BLOOD PRESSURE: 54 MMHG | OXYGEN SATURATION: 100 % | HEART RATE: 96 BPM | TEMPERATURE: 98 F

## 2020-10-19 DIAGNOSIS — D64.9 NORMOCHROMIC NORMOCYTIC ANEMIA: ICD-10-CM

## 2020-10-19 DIAGNOSIS — G62.9 NEUROPATHY: ICD-10-CM

## 2020-10-19 DIAGNOSIS — C50.811 MALIGNANT NEOPLASM OF OVERLAPPING SITES OF RIGHT BREAST IN FEMALE, ESTROGEN RECEPTOR POSITIVE (HCC): ICD-10-CM

## 2020-10-19 DIAGNOSIS — Z17.0 MALIGNANT NEOPLASM OF OVERLAPPING SITES OF RIGHT BREAST IN FEMALE, ESTROGEN RECEPTOR POSITIVE (HCC): ICD-10-CM

## 2020-10-19 DIAGNOSIS — N18.9 ANEMIA OF RENAL DISEASE: ICD-10-CM

## 2020-10-19 DIAGNOSIS — R06.02 SHORTNESS OF BREATH: Primary | ICD-10-CM

## 2020-10-19 DIAGNOSIS — D63.8 ANEMIA OF CHRONIC DISEASE: ICD-10-CM

## 2020-10-19 DIAGNOSIS — E66.01 MORBID OBESITY (HCC): ICD-10-CM

## 2020-10-19 DIAGNOSIS — R06.02 SOB (SHORTNESS OF BREATH): ICD-10-CM

## 2020-10-19 DIAGNOSIS — D63.1 ANEMIA OF RENAL DISEASE: ICD-10-CM

## 2020-10-19 DIAGNOSIS — R16.1 SPLENOMEGALY: ICD-10-CM

## 2020-10-19 PROCEDURE — 99215 OFFICE O/P EST HI 40 MIN: CPT | Performed by: INTERNAL MEDICINE

## 2020-10-19 NOTE — PROGRESS NOTES
Cancer Center Progress Note    Patient Name: Almita Larson   YOB: 1970   Medical Record Number: [id number]   CSN: [id number]   Attending Physician: Mago Delarosa M.D.    Referring Physician: Karla Sarmiento MD      Date of Visit: 10/19/2020 yeast infection and oral thrush. Saw her PCP and was placed on diflucan for 2 weeks. BP has not been very well controlled and medications were changed which she did not tolerate due to SE.  Ultimately stopped BB on her own due to side effects and felt (BENICAR) 20 MG Oral Tab, Take 1 tablet (20 mg total) by mouth daily. , Disp: 30 tablet, Rfl: 0  •  insulin glargine 100 UNIT/ML Subcutaneous Solution, Inject into the skin nightly., Disp: , Rfl:   •  PIOGLITAZONE HCL 45 MG Oral Tab, TAKE 1 TABLET (45 MG TO route daily. , Disp: 1 Box, Rfl: 2  •  Aspirin-Acetaminophen-Caffeine (EXCEDRIN MIGRAINE OR), Take 1 tablet by mouth daily as needed. , Disp: , Rfl:   •  Cetirizine HCl (ZYRTEC) 10 MG Oral Chew Tab, Chew 10 mg by mouth daily. , Disp: , Rfl:     Past Medical History:  Family History   Problem Relation Age of Onset   • Other (Other) Father         Lupus-53   • Diabetes Mother    • Hypertension Mother    • Other (Other) Sister         Depression   • Cancer Paternal Grandmother         thyroid cancer   • Cancer M file        Emotionally abused: Not on file        Physically abused: Not on file        Forced sexual activity: Not on file    Other Topics      Concerns:        Not on file    Social History Narrative      , no children, works in [employer]    [phone]    Exam Date:                    09/29/2020 11:02    [facility]                       [id number]    Ordering Physician:           James Solis     Attending Physician:         Jaxson Zheng MD     CC Physician:    Technologist:                     COMMENTS:    Left Ventricle:    Contrast enhancement was used to opacify and improve visualization of     the left ventricular chamber.  Normal left ventricular size and     systolic function, EF estimated at >55%.  No regional wall motion     abnor unable to be accurately assessed due to poor     Doppler envelope.       Sofiya Snyder MD.      (Electronically Signed)      Final            30 September 2020     Date:            09:27    Other Result Information   Epic, Interface Incoming Radiology Re Velocity           474 cm/s        MR Peak Gradient           90 mmHg         Mitral E Point Velocity    119 cm/s        Mitral A Point Velocity    111 cm/s        Mitral E to A Ratio        1.1             MV Deceleration Time       169 ms          LV E' Trivial, nonhemodynamically significant, pericardial effusion. CONCLUSIONS:    Contrast enhancement was used to opacify and improve visualization of     the left ventricular chamber.  Normal left ventricular size and     systolic function, EF estima stasis. The appendix is not clearly identified and there are no inflammatory changes in the right lower quadrant. There are diffuse spondylitic changes of the thoracolumbar spine.    Other Result Information   Epic, Interface Incoming Radiology Results - Doppler. History: 26-year-old female with bilateral lower extremity pain and swelling. Technique: Real-time sonographic images of the bilateral  lower extremity venous system were obtained.  Color Doppler sonography and spectral  waveform analysis wer multiplane thin sections of the breasts were obtained for the purpose of Tomosynthesis evaluation. The images were reconstructed and reviewed on the dedicated Tomosynthesis workstation.      BREAST COMPOSITION:   Scattered fibroglandular densities (25-50% slightly progressed when compared with the prior examination. Findings may reflect worsening pulmonary edema versus multifocal pneumonia. Heart and mediastinum are stable. No pneumothorax or large pleural effusion. Bony structures are intact.     IMPRESS Impression and Plan:  1. Anemia- normocytic, normochromic. Has had mild anemia previously felt to be from CKD. Acute drop likely from acute illness- ARF on CKD, infection. Will continue on oral iron for now. W/u as ordered.  Curiously her scans done

## 2020-10-20 ENCOUNTER — HOSPITAL ENCOUNTER (OUTPATIENT)
Dept: GENERAL RADIOLOGY | Facility: HOSPITAL | Age: 50
Discharge: HOME | End: 2020-10-20
Attending: INTERNAL MEDICINE
Payer: COMMERCIAL

## 2020-10-20 ENCOUNTER — APPOINTMENT (OUTPATIENT)
Dept: LAB | Age: 50
End: 2020-10-20
Attending: INTERNAL MEDICINE
Payer: COMMERCIAL

## 2020-10-20 ENCOUNTER — TELEPHONE (OUTPATIENT)
Dept: HEMATOLOGY/ONCOLOGY | Facility: HOSPITAL | Age: 50
End: 2020-10-20

## 2020-10-20 ENCOUNTER — HOSPITAL ENCOUNTER (OUTPATIENT)
Dept: NUCLEAR MEDICINE | Facility: HOSPITAL | Age: 50
Discharge: HOME | End: 2020-10-20
Attending: INTERNAL MEDICINE
Payer: COMMERCIAL

## 2020-10-20 DIAGNOSIS — R06.02 SHORTNESS OF BREATH: ICD-10-CM

## 2020-10-20 DIAGNOSIS — Z01.818 PREOP EXAMINATION: ICD-10-CM

## 2020-10-20 DIAGNOSIS — Z11.59 ENCOUNTER FOR SCREENING FOR OTHER VIRAL DISEASES: ICD-10-CM

## 2020-10-20 DIAGNOSIS — J18.9 PNEUMONIA DUE TO INFECTIOUS ORGANISM, UNSPECIFIED LATERALITY, UNSPECIFIED PART OF LUNG: ICD-10-CM

## 2020-10-20 PROCEDURE — 78580 LUNG PERFUSION IMAGING: CPT | Performed by: INTERNAL MEDICINE

## 2020-10-20 PROCEDURE — 71046 X-RAY EXAM CHEST 2 VIEWS: CPT | Performed by: INTERNAL MEDICINE

## 2020-10-20 RX ORDER — FOLIC ACID 1 MG/1
1 TABLET ORAL DAILY
Qty: 30 TABLET | Refills: 11 | Status: SHIPPED | OUTPATIENT
Start: 2020-10-20 | End: 2021-10-04

## 2020-10-20 NOTE — TELEPHONE ENCOUNTER
Called patient but went to voicemail. Left message regarding lab results we have so far and V/Q scan. Adviced her to schedule for IV iron and to start folic acid. Results also released in [address]

## 2020-10-21 NOTE — PROGRESS NOTES
Patient reviewed MyChart results and MD message. Nothing further needed from MD or nurse. Closing encounter.

## 2020-10-22 ENCOUNTER — TELEPHONE (OUTPATIENT)
Dept: HEMATOLOGY/ONCOLOGY | Facility: HOSPITAL | Age: 50
End: 2020-10-22

## 2020-10-22 PROBLEM — E53.8 B12 DEFICIENCY: Status: ACTIVE | Noted: 2020-10-22

## 2020-10-22 RX ORDER — CYANOCOBALAMIN 1000 UG/ML
1000 INJECTION INTRAMUSCULAR; SUBCUTANEOUS ONCE
Status: CANCELLED
Start: 2020-10-22 | End: 2020-10-22

## 2020-10-22 NOTE — TELEPHONE ENCOUNTER
Baylee Moran, RONEN  P Edw Bcn Erika Matthew Rns             I sent a My Chart message, but if you can call her she needs B 12 injections weekly x4 then monthly   Thanks   Bry Stout LVM with above.  Will add inj apt to infusion apt, can set up rest of inj apts

## 2020-10-26 ENCOUNTER — OFFICE VISIT (OUTPATIENT)
Dept: HEMATOLOGY/ONCOLOGY | Facility: HOSPITAL | Age: 50
End: 2020-10-26
Attending: INTERNAL MEDICINE
Payer: COMMERCIAL

## 2020-10-26 VITALS
HEIGHT: 65 IN | WEIGHT: 293 LBS | SYSTOLIC BLOOD PRESSURE: 159 MMHG | TEMPERATURE: 97 F | BODY MASS INDEX: 48.82 KG/M2 | HEART RATE: 92 BPM | DIASTOLIC BLOOD PRESSURE: 83 MMHG | RESPIRATION RATE: 18 BRPM | OXYGEN SATURATION: 99 %

## 2020-10-26 DIAGNOSIS — R79.89 ELEVATED SERUM CREATININE: ICD-10-CM

## 2020-10-26 DIAGNOSIS — E53.8 B12 DEFICIENCY: Primary | ICD-10-CM

## 2020-10-26 DIAGNOSIS — E61.1 IRON DEFICIENCY: ICD-10-CM

## 2020-10-26 PROCEDURE — 96376 TX/PRO/DX INJ SAME DRUG ADON: CPT

## 2020-10-26 PROCEDURE — 96372 THER/PROPH/DIAG INJ SC/IM: CPT

## 2020-10-26 PROCEDURE — 36415 COLL VENOUS BLD VENIPUNCTURE: CPT

## 2020-10-26 PROCEDURE — 96365 THER/PROPH/DIAG IV INF INIT: CPT

## 2020-10-26 PROCEDURE — 80053 COMPREHEN METABOLIC PANEL: CPT

## 2020-10-26 RX ORDER — CYANOCOBALAMIN 1000 UG/ML
1000 INJECTION INTRAMUSCULAR; SUBCUTANEOUS ONCE
Status: CANCELLED
Start: 2020-11-02 | End: 2020-11-02

## 2020-10-26 RX ORDER — CYANOCOBALAMIN 1000 UG/ML
1000 INJECTION INTRAMUSCULAR; SUBCUTANEOUS ONCE
Status: COMPLETED | OUTPATIENT
Start: 2020-10-26 | End: 2020-10-26

## 2020-10-26 RX ADMIN — CYANOCOBALAMIN 1000 MCG: 1000 INJECTION INTRAMUSCULAR; SUBCUTANEOUS at 15:33:00

## 2020-10-26 NOTE — PROGRESS NOTES
Pt. Tolerated infed test dose and infusion without incident. Post vitals- 131/79, 64, 18, 98.6. Will return as scheduled next Friday for b12 injection.

## 2020-10-26 NOTE — PROGRESS NOTES
[facility] Infusion Education Overview    Learner:  Patient    Learner's Barriers / Limitations of Education:  None    Medication:  Infed, B12       Most Common Side Effects:   Allergic reaction- possible upset stomach      Frequency: per md

## 2020-10-27 NOTE — PROGRESS NOTES
Please call Marcia--have her repeat kidney function in 1 week---as appears she is dry--meaning that's driving up her creatinine  -make sure she is drinking enough water before retaking the test-  -liver function is fine  -potassium is good

## 2020-11-03 DIAGNOSIS — G62.9 NEUROPATHY: Primary | ICD-10-CM

## 2020-11-03 RX ORDER — GABAPENTIN 300 MG/1
600 CAPSULE ORAL 2 TIMES DAILY
Qty: 120 CAPSULE | Refills: 5 | Status: SHIPPED | OUTPATIENT
Start: 2020-11-03 | End: 2021-04-26

## 2020-11-06 ENCOUNTER — OFFICE VISIT (OUTPATIENT)
Dept: HEMATOLOGY/ONCOLOGY | Facility: HOSPITAL | Age: 50
End: 2020-11-06
Attending: INTERNAL MEDICINE
Payer: COMMERCIAL

## 2020-11-06 DIAGNOSIS — E53.8 B12 DEFICIENCY: Primary | ICD-10-CM

## 2020-11-06 DIAGNOSIS — E61.1 IRON DEFICIENCY: ICD-10-CM

## 2020-11-06 PROCEDURE — 96372 THER/PROPH/DIAG INJ SC/IM: CPT

## 2020-11-06 RX ORDER — CYANOCOBALAMIN 1000 UG/ML
1000 INJECTION INTRAMUSCULAR; SUBCUTANEOUS ONCE
Status: CANCELLED
Start: 2020-11-09 | End: 2020-11-09

## 2020-11-06 RX ORDER — CYANOCOBALAMIN 1000 UG/ML
1000 INJECTION INTRAMUSCULAR; SUBCUTANEOUS ONCE
Status: COMPLETED | OUTPATIENT
Start: 2020-11-06 | End: 2020-11-06

## 2020-11-06 RX ADMIN — CYANOCOBALAMIN 1000 MCG: 1000 INJECTION INTRAMUSCULAR; SUBCUTANEOUS at 16:12:00

## 2020-11-06 NOTE — PROGRESS NOTES
Education Record    Learner:  Patient    Disease / Diagnosis: Vit b12 deficiency    Barriers / Limitations:  None   Comments:    Method:  Brief focused   Comments:    General Topics:  Plan of care reviewed   Comments:    Outcome:  Shows understanding   Com

## 2020-11-13 ENCOUNTER — OFFICE VISIT (OUTPATIENT)
Dept: HEMATOLOGY/ONCOLOGY | Facility: HOSPITAL | Age: 50
End: 2020-11-13
Attending: INTERNAL MEDICINE
Payer: COMMERCIAL

## 2020-11-13 DIAGNOSIS — E53.8 B12 DEFICIENCY: Primary | ICD-10-CM

## 2020-11-13 DIAGNOSIS — E61.1 IRON DEFICIENCY: ICD-10-CM

## 2020-11-13 PROCEDURE — 96372 THER/PROPH/DIAG INJ SC/IM: CPT

## 2020-11-13 RX ORDER — CYANOCOBALAMIN 1000 UG/ML
1000 INJECTION INTRAMUSCULAR; SUBCUTANEOUS ONCE
Status: CANCELLED
Start: 2020-11-20 | End: 2020-11-20

## 2020-11-13 RX ORDER — CYANOCOBALAMIN 1000 UG/ML
1000 INJECTION INTRAMUSCULAR; SUBCUTANEOUS ONCE
Status: COMPLETED | OUTPATIENT
Start: 2020-11-13 | End: 2020-11-13

## 2020-11-13 RX ADMIN — CYANOCOBALAMIN 1000 MCG: 1000 INJECTION INTRAMUSCULAR; SUBCUTANEOUS at 16:06:00

## 2020-11-20 ENCOUNTER — OFFICE VISIT (OUTPATIENT)
Dept: HEMATOLOGY/ONCOLOGY | Facility: HOSPITAL | Age: 50
End: 2020-11-20
Attending: INTERNAL MEDICINE
Payer: COMMERCIAL

## 2020-11-20 DIAGNOSIS — E53.8 B12 DEFICIENCY: Primary | ICD-10-CM

## 2020-11-20 DIAGNOSIS — D63.1 ANEMIA OF RENAL DISEASE: ICD-10-CM

## 2020-11-20 DIAGNOSIS — N18.9 ANEMIA OF RENAL DISEASE: ICD-10-CM

## 2020-11-20 DIAGNOSIS — E61.1 IRON DEFICIENCY: ICD-10-CM

## 2020-11-20 PROCEDURE — 83550 IRON BINDING TEST: CPT

## 2020-11-20 PROCEDURE — 84238 ASSAY NONENDOCRINE RECEPTOR: CPT

## 2020-11-20 PROCEDURE — 82746 ASSAY OF FOLIC ACID SERUM: CPT

## 2020-11-20 PROCEDURE — 85025 COMPLETE CBC W/AUTO DIFF WBC: CPT

## 2020-11-20 PROCEDURE — 83921 ORGANIC ACID SINGLE QUANT: CPT

## 2020-11-20 PROCEDURE — 82607 VITAMIN B-12: CPT

## 2020-11-20 PROCEDURE — 82728 ASSAY OF FERRITIN: CPT

## 2020-11-20 PROCEDURE — 96372 THER/PROPH/DIAG INJ SC/IM: CPT

## 2020-11-20 PROCEDURE — 83090 ASSAY OF HOMOCYSTEINE: CPT

## 2020-11-20 PROCEDURE — 83540 ASSAY OF IRON: CPT

## 2020-11-20 PROCEDURE — 36415 COLL VENOUS BLD VENIPUNCTURE: CPT

## 2020-11-20 RX ORDER — CYANOCOBALAMIN 1000 UG/ML
1000 INJECTION INTRAMUSCULAR; SUBCUTANEOUS ONCE
Status: COMPLETED | OUTPATIENT
Start: 2020-11-20 | End: 2020-11-20

## 2020-11-20 RX ORDER — CYANOCOBALAMIN 1000 UG/ML
1000 INJECTION INTRAMUSCULAR; SUBCUTANEOUS ONCE
Status: CANCELLED
Start: 2020-11-20 | End: 2020-11-20

## 2020-11-20 RX ADMIN — CYANOCOBALAMIN 1000 MCG: 1000 INJECTION INTRAMUSCULAR; SUBCUTANEOUS at 16:24:00

## 2020-11-20 NOTE — PROGRESS NOTES
Education Record    Learner:  Patient    Disease / Diagnosis: B12 deficiency - vitamin b12 injection    Barriers / Limitations:  None   Comments:    Method:  Brief focused and Reinforcement   Comments:    General Topics:  Plan of care reviewed   Comments:

## 2020-12-17 RX ORDER — CYANOCOBALAMIN 1000 UG/ML
1000 INJECTION INTRAMUSCULAR; SUBCUTANEOUS ONCE
Status: CANCELLED
Start: 2020-12-17 | End: 2020-12-17

## 2020-12-18 ENCOUNTER — OFFICE VISIT (OUTPATIENT)
Dept: HEMATOLOGY/ONCOLOGY | Facility: HOSPITAL | Age: 50
End: 2020-12-18
Attending: INTERNAL MEDICINE
Payer: COMMERCIAL

## 2020-12-18 DIAGNOSIS — E61.1 IRON DEFICIENCY: ICD-10-CM

## 2020-12-18 DIAGNOSIS — E53.8 B12 DEFICIENCY: Primary | ICD-10-CM

## 2020-12-18 PROCEDURE — 96372 THER/PROPH/DIAG INJ SC/IM: CPT

## 2020-12-18 RX ORDER — CYANOCOBALAMIN 1000 UG/ML
1000 INJECTION INTRAMUSCULAR; SUBCUTANEOUS ONCE
Status: COMPLETED | OUTPATIENT
Start: 2020-12-18 | End: 2020-12-18

## 2020-12-18 RX ORDER — CYANOCOBALAMIN 1000 UG/ML
1000 INJECTION INTRAMUSCULAR; SUBCUTANEOUS ONCE
Status: CANCELLED
Start: 2021-01-12 | End: 2021-01-12

## 2020-12-18 RX ADMIN — CYANOCOBALAMIN 1000 MCG: 1000 INJECTION INTRAMUSCULAR; SUBCUTANEOUS at 15:56:00

## 2020-12-29 ENCOUNTER — OFFICE VISIT (OUTPATIENT)
Dept: HEMATOLOGY/ONCOLOGY | Facility: HOSPITAL | Age: 50
End: 2020-12-29
Attending: INTERNAL MEDICINE
Payer: COMMERCIAL

## 2020-12-29 VITALS
SYSTOLIC BLOOD PRESSURE: 134 MMHG | TEMPERATURE: 98 F | DIASTOLIC BLOOD PRESSURE: 84 MMHG | RESPIRATION RATE: 18 BRPM | OXYGEN SATURATION: 97 % | HEART RATE: 103 BPM

## 2020-12-29 DIAGNOSIS — E61.1 IRON DEFICIENCY: ICD-10-CM

## 2020-12-29 DIAGNOSIS — E53.8 B12 DEFICIENCY: Primary | ICD-10-CM

## 2020-12-29 PROCEDURE — 96365 THER/PROPH/DIAG IV INF INIT: CPT

## 2020-12-29 RX ORDER — CYANOCOBALAMIN 1000 UG/ML
1000 INJECTION INTRAMUSCULAR; SUBCUTANEOUS ONCE
Status: CANCELLED
Start: 2021-02-01 | End: 2021-02-01

## 2020-12-29 NOTE — PROGRESS NOTES
Education Record    Learner:  Patient    Disease / Diagnosis: patient here for infed infusion. Tolerated well without issue.      Barriers / Limitations:  None   Comments:    Method:  Brief focused   Comments:    General Topics:  Plan of care reviewed   Com

## 2021-01-15 ENCOUNTER — APPOINTMENT (OUTPATIENT)
Dept: HEMATOLOGY/ONCOLOGY | Facility: HOSPITAL | Age: 51
End: 2021-01-15
Attending: INTERNAL MEDICINE
Payer: COMMERCIAL

## 2021-01-18 ENCOUNTER — OFFICE VISIT (OUTPATIENT)
Dept: HEMATOLOGY/ONCOLOGY | Facility: HOSPITAL | Age: 51
End: 2021-01-18
Attending: INTERNAL MEDICINE
Payer: COMMERCIAL

## 2021-01-18 DIAGNOSIS — E53.8 B12 DEFICIENCY: Primary | ICD-10-CM

## 2021-01-18 DIAGNOSIS — E61.1 IRON DEFICIENCY: ICD-10-CM

## 2021-01-18 PROCEDURE — 96372 THER/PROPH/DIAG INJ SC/IM: CPT

## 2021-01-18 RX ORDER — CYANOCOBALAMIN 1000 UG/ML
1000 INJECTION INTRAMUSCULAR; SUBCUTANEOUS ONCE
Status: COMPLETED | OUTPATIENT
Start: 2021-01-18 | End: 2021-01-18

## 2021-01-18 RX ORDER — CYANOCOBALAMIN 1000 UG/ML
1000 INJECTION INTRAMUSCULAR; SUBCUTANEOUS ONCE
Status: CANCELLED
Start: 2021-02-01 | End: 2021-02-01

## 2021-01-18 RX ADMIN — CYANOCOBALAMIN 1000 MCG: 1000 INJECTION INTRAMUSCULAR; SUBCUTANEOUS at 14:00:00

## 2021-01-18 NOTE — PROGRESS NOTES
Education Record    Learner:  Patient    Disease / Sherifia Rhodes b12 injection    Barriers / Limitations:  None   Comments:    Method:  Discussion   Comments:    General Topics:  Medication and Plan of care reviewed   Comments:    Outcome:  Shows under

## 2021-02-15 ENCOUNTER — OFFICE VISIT (OUTPATIENT)
Dept: HEMATOLOGY/ONCOLOGY | Facility: HOSPITAL | Age: 51
End: 2021-02-15
Attending: INTERNAL MEDICINE
Payer: COMMERCIAL

## 2021-02-15 DIAGNOSIS — E53.8 B12 DEFICIENCY: Primary | ICD-10-CM

## 2021-02-15 DIAGNOSIS — E61.1 IRON DEFICIENCY: ICD-10-CM

## 2021-02-15 PROCEDURE — 96372 THER/PROPH/DIAG INJ SC/IM: CPT

## 2021-02-15 RX ORDER — CYANOCOBALAMIN 1000 UG/ML
1000 INJECTION INTRAMUSCULAR; SUBCUTANEOUS ONCE
Status: CANCELLED
Start: 2021-03-01 | End: 2021-03-01

## 2021-02-15 RX ORDER — CYANOCOBALAMIN 1000 UG/ML
1000 INJECTION INTRAMUSCULAR; SUBCUTANEOUS ONCE
Status: COMPLETED | OUTPATIENT
Start: 2021-02-15 | End: 2021-02-15

## 2021-02-15 RX ADMIN — CYANOCOBALAMIN 1000 MCG: 1000 INJECTION INTRAMUSCULAR; SUBCUTANEOUS at 13:58:00

## 2021-02-15 NOTE — PROGRESS NOTES
Education Record    Learner:  Patient    Disease / Shane Miller b12 injection    Barriers / Limitations:  None   Comments:    Method:  Discussion   Comments:    General Topics:  Medication, Side effects and symptom management and Plan of care reviewed

## 2021-03-15 ENCOUNTER — OFFICE VISIT (OUTPATIENT)
Dept: HEMATOLOGY/ONCOLOGY | Facility: HOSPITAL | Age: 51
End: 2021-03-15
Attending: INTERNAL MEDICINE
Payer: COMMERCIAL

## 2021-03-15 DIAGNOSIS — E61.1 IRON DEFICIENCY: ICD-10-CM

## 2021-03-15 DIAGNOSIS — E53.8 B12 DEFICIENCY: Primary | ICD-10-CM

## 2021-03-15 PROCEDURE — 96372 THER/PROPH/DIAG INJ SC/IM: CPT

## 2021-03-15 RX ORDER — CYANOCOBALAMIN 1000 UG/ML
1000 INJECTION INTRAMUSCULAR; SUBCUTANEOUS ONCE
Status: CANCELLED
Start: 2021-04-01 | End: 2021-04-01

## 2021-03-15 RX ORDER — CYANOCOBALAMIN 1000 UG/ML
1000 INJECTION INTRAMUSCULAR; SUBCUTANEOUS ONCE
Status: COMPLETED | OUTPATIENT
Start: 2021-03-15 | End: 2021-03-15

## 2021-03-15 RX ADMIN — CYANOCOBALAMIN 1000 MCG: 1000 INJECTION INTRAMUSCULAR; SUBCUTANEOUS at 15:49:00

## 2021-03-17 DIAGNOSIS — Z23 NEED FOR VACCINATION: ICD-10-CM

## 2021-04-11 RX ORDER — TAMOXIFEN CITRATE 20 MG/1
TABLET ORAL
Qty: 30 TABLET | Refills: 5 | Status: SHIPPED | OUTPATIENT
Start: 2021-04-11 | End: 2021-10-04

## 2021-04-12 ENCOUNTER — OFFICE VISIT (OUTPATIENT)
Dept: HEMATOLOGY/ONCOLOGY | Facility: HOSPITAL | Age: 51
End: 2021-04-12
Attending: INTERNAL MEDICINE
Payer: COMMERCIAL

## 2021-04-12 DIAGNOSIS — E53.8 B12 DEFICIENCY: Primary | ICD-10-CM

## 2021-04-12 DIAGNOSIS — E61.1 IRON DEFICIENCY: ICD-10-CM

## 2021-04-12 PROCEDURE — 96372 THER/PROPH/DIAG INJ SC/IM: CPT

## 2021-04-12 RX ORDER — CYANOCOBALAMIN 1000 UG/ML
1000 INJECTION INTRAMUSCULAR; SUBCUTANEOUS ONCE
Status: COMPLETED | OUTPATIENT
Start: 2021-04-12 | End: 2021-04-12

## 2021-04-12 RX ORDER — CYANOCOBALAMIN 1000 UG/ML
1000 INJECTION INTRAMUSCULAR; SUBCUTANEOUS ONCE
Status: CANCELLED
Start: 2021-05-01 | End: 2021-05-01

## 2021-04-12 RX ADMIN — CYANOCOBALAMIN 1000 MCG: 1000 INJECTION INTRAMUSCULAR; SUBCUTANEOUS at 16:07:00

## 2021-04-12 NOTE — PROGRESS NOTES
Education Record    Learner:  Patient    Disease / Diagnosis: B12 injection    Barriers / Limitations:  None   Comments:    Method:  Discussion   Comments:    General Topics:  Plan of care reviewed   Comments:    Outcome:  Shows understanding   Comments:

## 2021-04-13 ENCOUNTER — PATIENT MESSAGE (OUTPATIENT)
Dept: HEMATOLOGY/ONCOLOGY | Facility: HOSPITAL | Age: 51
End: 2021-04-13

## 2021-04-13 DIAGNOSIS — D63.1 ANEMIA OF RENAL DISEASE: Primary | ICD-10-CM

## 2021-04-13 DIAGNOSIS — N18.9 ANEMIA OF RENAL DISEASE: Primary | ICD-10-CM

## 2021-04-13 NOTE — TELEPHONE ENCOUNTER
From: Lea Mata  To: Tanya Sylvester MD  Sent: 4/13/2021 9:17 AM CDT  Subject: Non-Urgent Medical Question    Hi Dr. Alistair Damian,     I was wondering when you want me to have blood work done again? I am doing ok but feeling quite tired and cold alot.

## 2021-04-24 DIAGNOSIS — G62.9 NEUROPATHY: ICD-10-CM

## 2021-04-26 RX ORDER — GABAPENTIN 300 MG/1
600 CAPSULE ORAL 2 TIMES DAILY
Qty: 120 CAPSULE | Refills: 5 | Status: SHIPPED | OUTPATIENT
Start: 2021-04-26 | End: 2021-11-08

## 2021-05-10 ENCOUNTER — OFFICE VISIT (OUTPATIENT)
Dept: HEMATOLOGY/ONCOLOGY | Facility: HOSPITAL | Age: 51
End: 2021-05-10
Attending: INTERNAL MEDICINE
Payer: COMMERCIAL

## 2021-05-10 DIAGNOSIS — D63.1 ANEMIA OF RENAL DISEASE: ICD-10-CM

## 2021-05-10 DIAGNOSIS — E61.1 IRON DEFICIENCY: ICD-10-CM

## 2021-05-10 DIAGNOSIS — E53.8 B12 DEFICIENCY: Primary | ICD-10-CM

## 2021-05-10 DIAGNOSIS — N18.9 ANEMIA OF RENAL DISEASE: ICD-10-CM

## 2021-05-10 PROCEDURE — 96372 THER/PROPH/DIAG INJ SC/IM: CPT

## 2021-05-10 PROCEDURE — 83550 IRON BINDING TEST: CPT

## 2021-05-10 PROCEDURE — 36415 COLL VENOUS BLD VENIPUNCTURE: CPT

## 2021-05-10 PROCEDURE — 83540 ASSAY OF IRON: CPT

## 2021-05-10 PROCEDURE — 82728 ASSAY OF FERRITIN: CPT

## 2021-05-10 PROCEDURE — 85025 COMPLETE CBC W/AUTO DIFF WBC: CPT

## 2021-05-10 RX ORDER — CYANOCOBALAMIN 1000 UG/ML
1000 INJECTION INTRAMUSCULAR; SUBCUTANEOUS ONCE
Status: CANCELLED
Start: 2021-06-01 | End: 2021-06-01

## 2021-05-10 RX ORDER — CYANOCOBALAMIN 1000 UG/ML
1000 INJECTION INTRAMUSCULAR; SUBCUTANEOUS ONCE
Status: COMPLETED | OUTPATIENT
Start: 2021-05-10 | End: 2021-05-10

## 2021-05-10 RX ADMIN — CYANOCOBALAMIN 1000 MCG: 1000 INJECTION INTRAMUSCULAR; SUBCUTANEOUS at 15:02:00

## 2021-05-18 ENCOUNTER — HOSPITAL ENCOUNTER (OUTPATIENT)
Dept: GENERAL RADIOLOGY | Age: 51
Discharge: HOME | End: 2021-05-18
Attending: ORTHOPAEDIC SURGERY
Payer: COMMERCIAL

## 2021-05-18 ENCOUNTER — TELEPHONE (OUTPATIENT)
Dept: ORTHOPEDICS CLINIC | Facility: CLINIC | Age: 51
End: 2021-05-18

## 2021-05-18 DIAGNOSIS — S82.891G CLOSED FRACTURE OF RIGHT ANKLE WITH DELAYED HEALING, SUBSEQUENT ENCOUNTER: Primary | ICD-10-CM

## 2021-05-18 DIAGNOSIS — S82.891G CLOSED FRACTURE OF RIGHT ANKLE WITH DELAYED HEALING, SUBSEQUENT ENCOUNTER: ICD-10-CM

## 2021-05-18 PROCEDURE — 73610 X-RAY EXAM OF ANKLE: CPT | Performed by: ORTHOPAEDIC SURGERY

## 2021-05-18 NOTE — TELEPHONE ENCOUNTER
Patient scheduled with Sho Kovacs, patient will complete xray prior to appt.   Future Appointments   Date Time Provider Juanita Medina   5/19/2021  1:00 PM RONEN Yee [facility]   6/8/2021  3:30 PM [address] [place] [address]

## 2021-05-18 NOTE — TELEPHONE ENCOUNTER
Patient states she is currently being seen at [facility] for right ankle pain, patient states she cannot bare any weight on ankle. Denies any recent falls or trauma. Pain is 8/10. +swelling and redness. No warmth to touch.     Follow up scheduled

## 2021-05-18 NOTE — TELEPHONE ENCOUNTER
Patient called regarding worsening Ankle pain that goes down to heel. Patient states she can barely walk. Patient has not been seen by Dimas Horvath since a year ago. Patient would like to know what to do. Please advise.

## 2021-05-19 ENCOUNTER — OFFICE VISIT (OUTPATIENT)
Dept: ORTHOPEDICS CLINIC | Facility: CLINIC | Age: 51
End: 2021-05-19
Payer: COMMERCIAL

## 2021-05-19 VITALS — HEART RATE: 95 BPM | OXYGEN SATURATION: 96 %

## 2021-05-19 DIAGNOSIS — M25.571 ACUTE RIGHT ANKLE PAIN: Primary | ICD-10-CM

## 2021-05-19 DIAGNOSIS — Q66.6 PES PLANOVALGUS: ICD-10-CM

## 2021-05-19 PROCEDURE — 99213 OFFICE O/P EST LOW 20 MIN: CPT | Performed by: NURSE PRACTITIONER

## 2021-05-19 NOTE — PROGRESS NOTES
[facility] Progress Note    CC: Patient presents with: Ankle Pain: right ankle    HPI: This 45year old female presents today for right ankle pain since she stepped out of bed and had sharp pain in the medial portion of her right ankle.   Previously TOTAL) BY MOUTH ONCE DAILY. 90 tablet 0   • ALPRAZolam 0.5 MG Oral Tab Take 1 tablet (0.5 mg total) by mouth daily as needed for Sleep. 30 tablet 0   • GABAPENTIN 300 MG Oral Cap TAKE 2 CAPSULES (600 MG TOTAL) BY MOUTH 2 (TWO) TIMES DAILY.  120 capsule 5 Comment:Tolerating cefadroxil, with thrush only so far  Ciprofloxacin           OTHER (SEE COMMENTS)    Comment:Due to levaquin sensitivity.   Glipizide               RASH  Levaquin [Levofloxa*    PAIN    Comment:Joint pain  Lipitor [Atorvastat*    MYALGIA

## 2021-06-08 ENCOUNTER — OFFICE VISIT (OUTPATIENT)
Dept: HEMATOLOGY/ONCOLOGY | Facility: HOSPITAL | Age: 51
End: 2021-06-08
Attending: INTERNAL MEDICINE
Payer: COMMERCIAL

## 2021-06-08 DIAGNOSIS — E53.8 B12 DEFICIENCY: Primary | ICD-10-CM

## 2021-06-08 DIAGNOSIS — E61.1 IRON DEFICIENCY: ICD-10-CM

## 2021-06-08 PROCEDURE — 96372 THER/PROPH/DIAG INJ SC/IM: CPT

## 2021-06-08 RX ORDER — CYANOCOBALAMIN 1000 UG/ML
1000 INJECTION INTRAMUSCULAR; SUBCUTANEOUS ONCE
Status: COMPLETED | OUTPATIENT
Start: 2021-06-08 | End: 2021-06-08

## 2021-06-08 RX ORDER — CYANOCOBALAMIN 1000 UG/ML
1000 INJECTION INTRAMUSCULAR; SUBCUTANEOUS ONCE
Status: CANCELLED
Start: 2021-07-01 | End: 2021-07-01

## 2021-06-08 RX ADMIN — CYANOCOBALAMIN 1000 MCG: 1000 INJECTION INTRAMUSCULAR; SUBCUTANEOUS at 15:33:00

## 2021-07-06 ENCOUNTER — OFFICE VISIT (OUTPATIENT)
Dept: HEMATOLOGY/ONCOLOGY | Facility: HOSPITAL | Age: 51
End: 2021-07-06
Attending: INTERNAL MEDICINE
Payer: COMMERCIAL

## 2021-07-06 DIAGNOSIS — E53.8 B12 DEFICIENCY: Primary | ICD-10-CM

## 2021-07-06 DIAGNOSIS — E61.1 IRON DEFICIENCY: ICD-10-CM

## 2021-07-06 PROCEDURE — 96372 THER/PROPH/DIAG INJ SC/IM: CPT

## 2021-07-06 RX ORDER — CYANOCOBALAMIN 1000 UG/ML
1000 INJECTION INTRAMUSCULAR; SUBCUTANEOUS ONCE
Status: CANCELLED
Start: 2021-08-01 | End: 2021-08-01

## 2021-07-06 RX ORDER — CYANOCOBALAMIN 1000 UG/ML
1000 INJECTION INTRAMUSCULAR; SUBCUTANEOUS ONCE
Status: COMPLETED | OUTPATIENT
Start: 2021-07-06 | End: 2021-07-06

## 2021-07-06 RX ADMIN — CYANOCOBALAMIN 1000 MCG: 1000 INJECTION INTRAMUSCULAR; SUBCUTANEOUS at 15:38:00

## 2021-07-06 NOTE — PROGRESS NOTES
Education Record    Learner:  Patient    Disease / Diagnosis: b12 inj   Barriers / Limitations:  None   Comments:    Method:  Brief focused   Comments:    General Topics:  Plan of care reviewed   Comments:    Outcome:  Shows understanding   Comments:

## 2021-07-15 ENCOUNTER — TELEMEDICINE (OUTPATIENT)
Dept: INTERNAL MEDICINE CLINIC | Facility: CLINIC | Age: 51
End: 2021-07-15

## 2021-07-15 DIAGNOSIS — I10 ESSENTIAL HYPERTENSION: ICD-10-CM

## 2021-07-15 DIAGNOSIS — E78.00 HYPERCHOLESTEREMIA: ICD-10-CM

## 2021-07-15 DIAGNOSIS — E66.9 DIABETES MELLITUS TYPE 2 IN OBESE (HCC): ICD-10-CM

## 2021-07-15 DIAGNOSIS — N18.30 STAGE 3 CHRONIC KIDNEY DISEASE, UNSPECIFIED WHETHER STAGE 3A OR 3B CKD (HCC): ICD-10-CM

## 2021-07-15 DIAGNOSIS — E66.01 MORBID OBESITY WITH BMI OF 60.0-69.9, ADULT (HCC): ICD-10-CM

## 2021-07-15 DIAGNOSIS — Z51.81 ENCOUNTER FOR THERAPEUTIC DRUG MONITORING: Primary | ICD-10-CM

## 2021-07-15 DIAGNOSIS — E11.69 DIABETES MELLITUS TYPE 2 IN OBESE (HCC): ICD-10-CM

## 2021-07-15 PROCEDURE — 99215 OFFICE O/P EST HI 40 MIN: CPT | Performed by: NURSE PRACTITIONER

## 2021-07-15 RX ORDER — SEMAGLUTIDE 1.34 MG/ML
0.5 INJECTION, SOLUTION SUBCUTANEOUS WEEKLY
Qty: 1.5 ML | Refills: 1 | Status: SHIPPED | OUTPATIENT
Start: 2021-07-15 | End: 2021-07-16

## 2021-07-15 NOTE — PATIENT INSTRUCTIONS
Welcome to the [facility] Weight Management Program...your Lifestyle Renovation begins now! Thank you for placing your trust in our health care team, I look forward to working with you along this journey to better health!     Next steps:     1.  Sched water for 30 days, then reduce to 1 every other day (this will reduce the cost). Capsules can be left out for 2 weeks, but then must be refrigerated.      Please download ivan MyFitness Pal or EmSense! to monitor daily dietary intake and you will be able to s

## 2021-07-15 NOTE — PROGRESS NOTES
[facility] Weight Management new patient consult via video visit:    Subjective    This visit is conducted using Telemedicine with live, interactive video and audio. HISTORY OF PRESENT ILLNESS  No chief complaint on file.       Lana Blunt is negative  Seizures: negative  Joint-related conditions: negative  Liver disease: fatty liver  Renal disease: decreased GFR  Diabetes: Type 2 diabetes with insulin therapy, FBS readings around 120-130.   Thyroid disease: negative  Constipation: negative  Oth 05/10/2021    MCH 31.5 05/10/2021    MCHC 31.2 05/10/2021    RDW 15.8 (H) 05/10/2021    .0 (L) 05/10/2021     Lab Results   Component Value Date     (H) 10/26/2020    BUN 55 (H) 10/26/2020    BUNCREA 29.7 (H) 10/26/2020    CREATSERUM 1.85 (H) Sodium 100 MG Oral Cap, Take 1 capsule (100 mg total) by mouth 3 (three) times daily before meals. , Disp: 270 capsule, Rfl: 3  Potassium Chloride ER 20 MEQ Oral Tab CR, Take 1 tablet (20 mEq total) by mouth daily. , Disp: 90 tablet, Rfl: 1  Pentosan Polysul into the skin once a week. See discharge instructions for starting dose  -     OP REFERRAL TO [facility] ([facility] USE ONLY)  -     TRULICITY 4.12 DX/0.4WZ Subcutaneous Solution Pen-injector; Inject 0.75 mg into the skin once a week for 28 days.     Hyper recommended. · Discussed the role of sleep and stress in weight management. · Labs orders as above. · Counseled on comprehensive weight loss plan including attention to nutrition, exercise and behavior/stress management for success.  See patient instruct serving per meal about the size of your inner palm. 4.  Consume non starchy veggies daily working towards making them a good 50% of your daily food intake. Add them to lunch and dinner consistently.   5.  Start a daily probiotic: VSL#3 is recommended, (ord counseling, as reported above, and/or coordination of care with total time spent with patient greater than 45 minutes.     RONEN Parada  7/15/2021    Follow up visit: 1 months    Duration of the service: 50 minutes

## 2021-07-16 ENCOUNTER — TELEPHONE (OUTPATIENT)
Dept: INTERNAL MEDICINE CLINIC | Facility: CLINIC | Age: 51
End: 2021-07-16

## 2021-07-16 RX ORDER — DULAGLUTIDE 0.75 MG/.5ML
0.75 INJECTION, SOLUTION SUBCUTANEOUS WEEKLY
Qty: 4 EACH | Refills: 1 | Status: SHIPPED | OUTPATIENT
Start: 2021-07-16 | End: 2021-08-13

## 2021-07-16 NOTE — TELEPHONE ENCOUNTER
I called # provided by Patient - it is [facility]  Spoke with Valeria ochoa and she said the medicine will not be covered she must try Trulicity, Byetta, Bydureon    What would you like to do?

## 2021-07-16 NOTE — TELEPHONE ENCOUNTER
Received Fax from [employer] to obtain PA for Ozempic - with no information on how to obtain PA.   I tried calling them and they are closed  Diagnosis E11.9 DM  Morbid Obesity E66.01  New start  Has been on insulin  Decreased GFR  CKD Stage 3

## 2021-07-16 NOTE — PROGRESS NOTES
[facility] Weight Management new patient consult via video visit:    Subjective    This visit is conducted using Telemedicine with live, interactive video and audio. HISTORY OF PRESENT ILLNESS  No chief complaint on file.       Mata Delaney is negative  Seizures: negative  Joint-related conditions: negative  Liver disease: fatty liver  Renal disease: decreased GFR  Diabetes: Type 2 diabetes with insulin therapy, FBS readings around 120-130.   Thyroid disease: negative  Constipation: negative  Oth 05/10/2021    MCH 31.5 05/10/2021    MCHC 31.2 05/10/2021    RDW 15.8 (H) 05/10/2021    .0 (L) 05/10/2021     Lab Results   Component Value Date     (H) 10/26/2020    BUN 55 (H) 10/26/2020    BUNCREA 29.7 (H) 10/26/2020    CREATSERUM 1.85 (H) Sodium 100 MG Oral Cap, Take 1 capsule (100 mg total) by mouth 3 (three) times daily before meals. , Disp: 270 capsule, Rfl: 3  Potassium Chloride ER 20 MEQ Oral Tab CR, Take 1 tablet (20 mEq total) by mouth daily. , Disp: 90 tablet, Rfl: 1  Pentosan Polysul [facility] USE ONLY)  -     TRULICITY 6.85 XN/8.5IU Subcutaneous Solution Pen-injector; Inject 0.75 mg into the skin once a week for 28 days.     Hypercholesteremia  -     OP REFERRAL TO [facility] ([facility] USE ONLY)  -     TRULICITY 3.98 OV/2.4XL Subcutaneous See patient instruction below for more details. Patient Instructions   Welcome to the [facility] Weight Management Program...your Lifestyle Renovation begins now!   Thank you for placing your trust in our health care team, I look forward to working recommended, (order on line at www.Buzzwire. com). Take 1 capsule daily with water for 30 days, then reduce to 1 every other day (this will reduce the cost). Capsules can be left out for 2 weeks, but then must be refrigerated.      Please download ivan "[employer]

## 2021-07-16 NOTE — TELEPHONE ENCOUNTER
I called [facility] to obtain information to request PA since none was provided on fax they sent.   I need to call [phone]  Patient ID is [id number]  Outpatient Medication Detail     Disp Refills Start End    OZEMPIC, 0.25 OR 0.5 MG/DOSE, 2 MG/1.5ML Subcutaneo

## 2021-07-16 NOTE — TELEPHONE ENCOUNTER
I just completed her VV note and sent AVS. I switched her to Trulicity d/t no coverage for Ozempic. Instructions are on her AVS for review.  I believe her spouse is already on Trulicity and can help with instruction for dosing, otherwise she can visit the T

## 2021-08-03 ENCOUNTER — OFFICE VISIT (OUTPATIENT)
Dept: HEMATOLOGY/ONCOLOGY | Facility: HOSPITAL | Age: 51
End: 2021-08-03
Attending: INTERNAL MEDICINE
Payer: COMMERCIAL

## 2021-08-03 DIAGNOSIS — E61.1 IRON DEFICIENCY: ICD-10-CM

## 2021-08-03 DIAGNOSIS — E53.8 B12 DEFICIENCY: Primary | ICD-10-CM

## 2021-08-03 PROCEDURE — 96372 THER/PROPH/DIAG INJ SC/IM: CPT

## 2021-08-03 RX ORDER — CYANOCOBALAMIN 1000 UG/ML
1000 INJECTION INTRAMUSCULAR; SUBCUTANEOUS ONCE
Status: COMPLETED | OUTPATIENT
Start: 2021-08-03 | End: 2021-08-03

## 2021-08-03 RX ORDER — CYANOCOBALAMIN 1000 UG/ML
1000 INJECTION INTRAMUSCULAR; SUBCUTANEOUS ONCE
Status: CANCELLED
Start: 2021-09-01 | End: 2021-09-01

## 2021-08-03 RX ADMIN — CYANOCOBALAMIN 1000 MCG: 1000 INJECTION INTRAMUSCULAR; SUBCUTANEOUS at 15:36:00

## 2021-08-03 NOTE — PROGRESS NOTES
Education Record    Learner:  Patient    Disease / Diagnosis:B12 Deficiency    Barriers / Limitations:  None   Comments:    Method:  Discussion   Comments:    General Topics:  Plan of care reviewed   Comments:    Outcome:  Shows understanding   Comments:Pt

## 2021-08-16 NOTE — PROGRESS NOTES
[facility] Weight Management follow up via video visit:    Subjective    This visit is conducted using Telemedicine with live, interactive video and audio.     Chief Complaint:  Follow up visit for lifestyle and medical management for overweight, Columba Bernard therapeutic drug monitoring  -     TRULICITY 1.5 MF/8.3KK Subcutaneous Solution Pen-injector; Inject 1.5 mg into the skin once a week for 28 days.     Morbid obesity with BMI of 60.0-69.9, adult (HCC)  - Increase Trulicity dose, stop Actos (SEs of weight ga dietician, Sarthak Lovell, if you have not already done so. She is available for in person or video. Balanced Nutrition includes:     1. Eat regular meals (breakfast, lunch, dinner) about every 4-5 hours  2.  Eat a balanced plate with protein and produce at all · Gobble, Blue Apron, [facility], Every Plate, Kõlun[employer]mme- on line meal delivery programs for preparation at home  · Meal Village in [place] for homemade meals to go @ www.Cuiker  · Diet Doctor @ www. dietdoctor. com - low carb swaps  · Yummly - meal p

## 2021-08-17 ENCOUNTER — TELEMEDICINE (OUTPATIENT)
Dept: INTERNAL MEDICINE CLINIC | Facility: CLINIC | Age: 51
End: 2021-08-17

## 2021-08-17 DIAGNOSIS — E78.00 HYPERCHOLESTEREMIA: ICD-10-CM

## 2021-08-17 DIAGNOSIS — E11.69 DIABETES MELLITUS TYPE 2 IN OBESE (HCC): ICD-10-CM

## 2021-08-17 DIAGNOSIS — E66.01 MORBID OBESITY WITH BMI OF 60.0-69.9, ADULT (HCC): ICD-10-CM

## 2021-08-17 DIAGNOSIS — Z51.81 ENCOUNTER FOR THERAPEUTIC DRUG MONITORING: Primary | ICD-10-CM

## 2021-08-17 DIAGNOSIS — N18.30 STAGE 3 CHRONIC KIDNEY DISEASE, UNSPECIFIED WHETHER STAGE 3A OR 3B CKD (HCC): ICD-10-CM

## 2021-08-17 DIAGNOSIS — E66.9 DIABETES MELLITUS TYPE 2 IN OBESE (HCC): ICD-10-CM

## 2021-08-17 DIAGNOSIS — I10 ESSENTIAL HYPERTENSION: ICD-10-CM

## 2021-08-17 PROCEDURE — 99213 OFFICE O/P EST LOW 20 MIN: CPT | Performed by: NURSE PRACTITIONER

## 2021-08-17 RX ORDER — DULAGLUTIDE 1.5 MG/.5ML
1.5 INJECTION, SOLUTION SUBCUTANEOUS WEEKLY
Qty: 2 ML | Refills: 0 | Status: SHIPPED | OUTPATIENT
Start: 2021-08-17 | End: 2021-09-11

## 2021-08-17 NOTE — PATIENT INSTRUCTIONS
Continue making lifestyle changes that focus on good nutrition, regular exercise and stress management. Medication Plan: Shar [place] this week of Trulicity at .75 mg weekly and then increase dose to 1.5 mg weekly- new script at pharmacy. Discontinue Actos.  C Apps for on the [employer]  · archify 7 Minute Workout (orange box with white 7) - free on the go HIIT training steve  · Nhung Steve @ www. onepeloton. com  · 5 Minute Kitchen Workout https://Seven Energy/4ucp-igefvxz-jzvmgmb/    Nutrition Trackers and To https://youtu. be/1G0frlsFE6h)  · The Dr. Patricia Barnes by Dr. Irineo Diaz MD  · Fitlosophy Fitspiration - journal to better health (found at [employer] in fitness aisle)  · Whole Again by Ricky Myrick - discovering your true self after trauma

## 2021-09-01 DIAGNOSIS — E78.00 HYPERCHOLESTEREMIA: ICD-10-CM

## 2021-09-01 DIAGNOSIS — N18.30 STAGE 3 CHRONIC KIDNEY DISEASE, UNSPECIFIED WHETHER STAGE 3A OR 3B CKD (HCC): ICD-10-CM

## 2021-09-01 DIAGNOSIS — E66.9 DIABETES MELLITUS TYPE 2 IN OBESE (HCC): ICD-10-CM

## 2021-09-01 DIAGNOSIS — E11.69 DIABETES MELLITUS TYPE 2 IN OBESE (HCC): ICD-10-CM

## 2021-09-01 DIAGNOSIS — I10 ESSENTIAL HYPERTENSION: ICD-10-CM

## 2021-09-01 DIAGNOSIS — Z51.81 ENCOUNTER FOR THERAPEUTIC DRUG MONITORING: ICD-10-CM

## 2021-09-01 DIAGNOSIS — E66.01 MORBID OBESITY WITH BMI OF 60.0-69.9, ADULT (HCC): ICD-10-CM

## 2021-09-01 RX ORDER — DULAGLUTIDE 0.75 MG/.5ML
0.75 INJECTION, SOLUTION SUBCUTANEOUS WEEKLY
Refills: 1 | OUTPATIENT
Start: 2021-09-01 | End: 2021-09-29

## 2021-09-01 NOTE — TELEPHONE ENCOUNTER
Requesting Trulicity 7.68 mg  LOV: 8/17/21  RTC: one month  Last Relevant Labs: no a1c  Filled: increased to 1.5 mg on 8/17/21    Future Appointments   Date Time Provider Juanita Medina   9/9/2021  3:30 PM [address] [place] [address]   9/22/2021  2:4

## 2021-09-09 ENCOUNTER — APPOINTMENT (OUTPATIENT)
Dept: HEMATOLOGY/ONCOLOGY | Facility: HOSPITAL | Age: 51
End: 2021-09-09
Attending: INTERNAL MEDICINE
Payer: COMMERCIAL

## 2021-09-10 DIAGNOSIS — N18.30 STAGE 3 CHRONIC KIDNEY DISEASE, UNSPECIFIED WHETHER STAGE 3A OR 3B CKD (HCC): ICD-10-CM

## 2021-09-10 DIAGNOSIS — E66.01 MORBID OBESITY WITH BMI OF 60.0-69.9, ADULT (HCC): ICD-10-CM

## 2021-09-10 DIAGNOSIS — E78.00 HYPERCHOLESTEREMIA: ICD-10-CM

## 2021-09-10 DIAGNOSIS — I10 ESSENTIAL HYPERTENSION: ICD-10-CM

## 2021-09-10 DIAGNOSIS — E66.9 DIABETES MELLITUS TYPE 2 IN OBESE (HCC): ICD-10-CM

## 2021-09-10 DIAGNOSIS — Z51.81 ENCOUNTER FOR THERAPEUTIC DRUG MONITORING: ICD-10-CM

## 2021-09-10 DIAGNOSIS — E11.69 DIABETES MELLITUS TYPE 2 IN OBESE (HCC): ICD-10-CM

## 2021-09-10 NOTE — TELEPHONE ENCOUNTER
Requesting Trulicity 1.5 mg  LOV: 8/22/08  RTC: one month  Last Relevant Labs: no A1c  Filled: 8/17/21 #2 ml with 0 refills    My chart sent to schedule appt as requested this month.

## 2021-09-11 RX ORDER — DULAGLUTIDE 1.5 MG/.5ML
1.5 INJECTION, SOLUTION SUBCUTANEOUS WEEKLY
Qty: 4 EACH | Refills: 1 | Status: SHIPPED | OUTPATIENT
Start: 2021-09-11 | End: 2021-11-09

## 2021-09-17 ENCOUNTER — OFFICE VISIT (OUTPATIENT)
Dept: HEMATOLOGY/ONCOLOGY | Facility: HOSPITAL | Age: 51
End: 2021-09-17
Attending: INTERNAL MEDICINE
Payer: COMMERCIAL

## 2021-09-17 DIAGNOSIS — E61.1 IRON DEFICIENCY: ICD-10-CM

## 2021-09-17 DIAGNOSIS — E53.8 B12 DEFICIENCY: Primary | ICD-10-CM

## 2021-09-17 PROCEDURE — 96372 THER/PROPH/DIAG INJ SC/IM: CPT

## 2021-09-17 RX ORDER — CYANOCOBALAMIN 1000 UG/ML
1000 INJECTION INTRAMUSCULAR; SUBCUTANEOUS ONCE
Status: CANCELLED
Start: 2021-10-01 | End: 2021-10-01

## 2021-09-17 RX ORDER — CYANOCOBALAMIN 1000 UG/ML
1000 INJECTION INTRAMUSCULAR; SUBCUTANEOUS ONCE
Status: COMPLETED | OUTPATIENT
Start: 2021-09-17 | End: 2021-09-17

## 2021-09-17 RX ADMIN — CYANOCOBALAMIN 1000 MCG: 1000 INJECTION INTRAMUSCULAR; SUBCUTANEOUS at 15:30:00

## 2021-10-04 RX ORDER — TAMOXIFEN CITRATE 20 MG/1
TABLET ORAL
Qty: 30 TABLET | Refills: 5 | Status: SHIPPED | OUTPATIENT
Start: 2021-10-04 | End: 2022-03-24

## 2021-10-04 RX ORDER — FOLIC ACID 1 MG/1
TABLET ORAL
Qty: 30 TABLET | Refills: 11 | Status: SHIPPED | OUTPATIENT
Start: 2021-10-04 | End: 2022-09-19

## 2021-10-15 ENCOUNTER — OFFICE VISIT (OUTPATIENT)
Dept: HEMATOLOGY/ONCOLOGY | Facility: HOSPITAL | Age: 51
End: 2021-10-15
Attending: INTERNAL MEDICINE
Payer: COMMERCIAL

## 2021-10-15 DIAGNOSIS — E61.1 IRON DEFICIENCY: ICD-10-CM

## 2021-10-15 DIAGNOSIS — E53.8 B12 DEFICIENCY: Primary | ICD-10-CM

## 2021-10-15 PROCEDURE — 96372 THER/PROPH/DIAG INJ SC/IM: CPT

## 2021-10-15 RX ORDER — CYANOCOBALAMIN 1000 UG/ML
1000 INJECTION INTRAMUSCULAR; SUBCUTANEOUS ONCE
Status: CANCELLED
Start: 2021-11-01 | End: 2021-11-01

## 2021-10-15 RX ORDER — CYANOCOBALAMIN 1000 UG/ML
1000 INJECTION INTRAMUSCULAR; SUBCUTANEOUS ONCE
Status: COMPLETED | OUTPATIENT
Start: 2021-10-15 | End: 2021-10-15

## 2021-10-15 RX ADMIN — CYANOCOBALAMIN 1000 MCG: 1000 INJECTION INTRAMUSCULAR; SUBCUTANEOUS at 15:32:00

## 2021-11-07 DIAGNOSIS — E78.00 HYPERCHOLESTEREMIA: ICD-10-CM

## 2021-11-07 DIAGNOSIS — E66.01 MORBID OBESITY WITH BMI OF 60.0-69.9, ADULT (HCC): ICD-10-CM

## 2021-11-07 DIAGNOSIS — Z51.81 ENCOUNTER FOR THERAPEUTIC DRUG MONITORING: ICD-10-CM

## 2021-11-07 DIAGNOSIS — N18.30 STAGE 3 CHRONIC KIDNEY DISEASE, UNSPECIFIED WHETHER STAGE 3A OR 3B CKD (HCC): ICD-10-CM

## 2021-11-07 DIAGNOSIS — I10 ESSENTIAL HYPERTENSION: ICD-10-CM

## 2021-11-07 DIAGNOSIS — E66.9 DIABETES MELLITUS TYPE 2 IN OBESE: ICD-10-CM

## 2021-11-07 DIAGNOSIS — E11.69 DIABETES MELLITUS TYPE 2 IN OBESE: ICD-10-CM

## 2021-11-08 DIAGNOSIS — G62.9 NEUROPATHY: ICD-10-CM

## 2021-11-08 RX ORDER — GABAPENTIN 300 MG/1
600 CAPSULE ORAL 2 TIMES DAILY
Qty: 120 CAPSULE | Refills: 5 | Status: SHIPPED | OUTPATIENT
Start: 2021-11-08

## 2021-11-09 RX ORDER — DULAGLUTIDE 1.5 MG/.5ML
1.5 INJECTION, SOLUTION SUBCUTANEOUS WEEKLY
Qty: 4 EACH | Refills: 0 | Status: SHIPPED | OUTPATIENT
Start: 2021-11-09 | End: 2022-01-14

## 2021-11-09 NOTE — TELEPHONE ENCOUNTER
No further refills beyond this. She was a VV consult and I have advised her she needs to come into the clinic for further continued treatment as she has not been see at all.  Thank you

## 2021-11-11 NOTE — TELEPHONE ENCOUNTER
I can provide a note for work if needed to be seen in office. She needs to be accountable and responsible. Not making a f/u after several advisements does not show discipline to the program. Not sure if this is a good fit if she can not commit. This is the very last refill and will need to check in with PCP further if needed. Approve 4 pens only.

## 2021-12-01 DIAGNOSIS — I10 ESSENTIAL HYPERTENSION: ICD-10-CM

## 2021-12-01 DIAGNOSIS — E78.00 HYPERCHOLESTEREMIA: ICD-10-CM

## 2021-12-01 DIAGNOSIS — Z51.81 ENCOUNTER FOR THERAPEUTIC DRUG MONITORING: ICD-10-CM

## 2021-12-01 DIAGNOSIS — E66.9 DIABETES MELLITUS TYPE 2 IN OBESE: ICD-10-CM

## 2021-12-01 DIAGNOSIS — E66.01 MORBID OBESITY WITH BMI OF 60.0-69.9, ADULT (HCC): ICD-10-CM

## 2021-12-01 DIAGNOSIS — E11.69 DIABETES MELLITUS TYPE 2 IN OBESE: ICD-10-CM

## 2021-12-01 DIAGNOSIS — N18.30 STAGE 3 CHRONIC KIDNEY DISEASE, UNSPECIFIED WHETHER STAGE 3A OR 3B CKD (HCC): ICD-10-CM

## 2021-12-03 RX ORDER — DULAGLUTIDE 1.5 MG/.5ML
1.5 INJECTION, SOLUTION SUBCUTANEOUS WEEKLY
Refills: 0 | OUTPATIENT
Start: 2021-12-03

## 2021-12-10 ENCOUNTER — OFFICE VISIT (OUTPATIENT)
Dept: HEMATOLOGY/ONCOLOGY | Facility: HOSPITAL | Age: 51
End: 2021-12-10
Attending: INTERNAL MEDICINE
Payer: COMMERCIAL

## 2021-12-10 DIAGNOSIS — E53.8 B12 DEFICIENCY: Primary | ICD-10-CM

## 2021-12-10 DIAGNOSIS — E61.1 IRON DEFICIENCY: ICD-10-CM

## 2021-12-10 PROCEDURE — 96372 THER/PROPH/DIAG INJ SC/IM: CPT

## 2021-12-10 RX ORDER — CYANOCOBALAMIN 1000 UG/ML
1000 INJECTION INTRAMUSCULAR; SUBCUTANEOUS ONCE
Status: CANCELLED
Start: 2022-01-01 | End: 2022-01-01

## 2021-12-10 RX ORDER — CYANOCOBALAMIN 1000 UG/ML
1000 INJECTION INTRAMUSCULAR; SUBCUTANEOUS ONCE
Status: COMPLETED | OUTPATIENT
Start: 2021-12-10 | End: 2021-12-10

## 2021-12-10 RX ADMIN — CYANOCOBALAMIN 1000 MCG: 1000 INJECTION INTRAMUSCULAR; SUBCUTANEOUS at 11:36:00

## 2022-01-07 ENCOUNTER — APPOINTMENT (OUTPATIENT)
Dept: HEMATOLOGY/ONCOLOGY | Facility: HOSPITAL | Age: 52
End: 2022-01-07
Attending: INTERNAL MEDICINE
Payer: COMMERCIAL

## 2022-01-14 ENCOUNTER — OFFICE VISIT (OUTPATIENT)
Dept: HEMATOLOGY/ONCOLOGY | Facility: HOSPITAL | Age: 52
End: 2022-01-14
Attending: INTERNAL MEDICINE
Payer: COMMERCIAL

## 2022-01-14 VITALS
HEIGHT: 65 IN | TEMPERATURE: 99 F | OXYGEN SATURATION: 95 % | WEIGHT: 293 LBS | RESPIRATION RATE: 18 BRPM | HEART RATE: 111 BPM | SYSTOLIC BLOOD PRESSURE: 183 MMHG | BODY MASS INDEX: 48.82 KG/M2 | DIASTOLIC BLOOD PRESSURE: 85 MMHG

## 2022-01-14 DIAGNOSIS — D63.1 ANEMIA OF RENAL DISEASE: ICD-10-CM

## 2022-01-14 DIAGNOSIS — E61.1 IRON DEFICIENCY: ICD-10-CM

## 2022-01-14 DIAGNOSIS — G62.9 NEUROPATHY: ICD-10-CM

## 2022-01-14 DIAGNOSIS — Z17.0 MALIGNANT NEOPLASM OF OVERLAPPING SITES OF RIGHT BREAST IN FEMALE, ESTROGEN RECEPTOR POSITIVE (HCC): ICD-10-CM

## 2022-01-14 DIAGNOSIS — N18.9 ANEMIA OF RENAL DISEASE: ICD-10-CM

## 2022-01-14 DIAGNOSIS — C50.811 MALIGNANT NEOPLASM OF OVERLAPPING SITES OF RIGHT BREAST IN FEMALE, ESTROGEN RECEPTOR POSITIVE (HCC): ICD-10-CM

## 2022-01-14 DIAGNOSIS — E53.8 B12 DEFICIENCY: Primary | ICD-10-CM

## 2022-01-14 DIAGNOSIS — B35.4 TINEA CORPORIS: ICD-10-CM

## 2022-01-14 LAB
ALBUMIN SERPL-MCNC: 3.4 G/DL (ref 3.4–5)
ALBUMIN/GLOB SERPL: 0.6 {RATIO} (ref 1–2)
ALP LIVER SERPL-CCNC: 89 U/L
ALT SERPL-CCNC: 29 U/L
ANION GAP SERPL CALC-SCNC: 8 MMOL/L (ref 0–18)
AST SERPL-CCNC: 40 U/L (ref 15–37)
BASOPHILS # BLD AUTO: 0.03 X10(3) UL (ref 0–0.2)
BASOPHILS NFR BLD AUTO: 0.7 %
BILIRUB SERPL-MCNC: 0.7 MG/DL (ref 0.1–2)
BRCA1 C.185DELAG BLD/T QL: 29.8 U/ML (ref ?–38)
BUN BLD-MCNC: 25 MG/DL (ref 7–18)
CALCIUM BLD-MCNC: 10 MG/DL (ref 8.5–10.1)
CHLORIDE SERPL-SCNC: 97 MMOL/L (ref 98–112)
CO2 SERPL-SCNC: 28 MMOL/L (ref 21–32)
CREAT BLD-MCNC: 1.9 MG/DL
DEPRECATED HBV CORE AB SER IA-ACNC: 445 NG/ML
EOSINOPHIL # BLD AUTO: 0.14 X10(3) UL (ref 0–0.7)
EOSINOPHIL NFR BLD AUTO: 3.2 %
ERYTHROCYTE [DISTWIDTH] IN BLOOD BY AUTOMATED COUNT: 15.1 %
FASTING STATUS PATIENT QL REPORTED: NO
GLOBULIN PLAS-MCNC: 5.4 G/DL (ref 2.8–4.4)
GLUCOSE BLD-MCNC: 331 MG/DL (ref 70–99)
HCT VFR BLD AUTO: 38.2 %
HGB BLD-MCNC: 12.5 G/DL
IMM GRANULOCYTES # BLD AUTO: 0.03 X10(3) UL (ref 0–1)
IMM GRANULOCYTES NFR BLD: 0.7 %
IRON SATN MFR SERPL: 20 %
IRON SERPL-MCNC: 71 UG/DL
LYMPHOCYTES # BLD AUTO: 0.75 X10(3) UL (ref 1–4)
LYMPHOCYTES NFR BLD AUTO: 17.2 %
MCH RBC QN AUTO: 31.4 PG (ref 26–34)
MCHC RBC AUTO-ENTMCNC: 32.7 G/DL (ref 31–37)
MCV RBC AUTO: 96 FL
MONOCYTES # BLD AUTO: 0.42 X10(3) UL (ref 0.1–1)
MONOCYTES NFR BLD AUTO: 9.6 %
NEUTROPHILS # BLD AUTO: 3 X10 (3) UL (ref 1.5–7.7)
NEUTROPHILS # BLD AUTO: 3 X10(3) UL (ref 1.5–7.7)
NEUTROPHILS NFR BLD AUTO: 68.6 %
OSMOLALITY SERPL CALC.SUM OF ELEC: 293 MOSM/KG (ref 275–295)
PLATELET # BLD AUTO: 153 10(3)UL (ref 150–450)
POTASSIUM SERPL-SCNC: 3.8 MMOL/L (ref 3.5–5.1)
PROT SERPL-MCNC: 8.8 G/DL (ref 6.4–8.2)
RBC # BLD AUTO: 3.98 X10(6)UL
SODIUM SERPL-SCNC: 133 MMOL/L (ref 136–145)
TIBC SERPL-MCNC: 353 UG/DL (ref 240–450)
TRANSFERRIN SERPL-MCNC: 237 MG/DL (ref 200–360)
VIT B12 SERPL-MCNC: >2000 PG/ML (ref 193–986)
WBC # BLD AUTO: 4.4 X10(3) UL (ref 4–11)

## 2022-01-14 PROCEDURE — 96372 THER/PROPH/DIAG INJ SC/IM: CPT

## 2022-01-14 PROCEDURE — 83921 ORGANIC ACID SINGLE QUANT: CPT

## 2022-01-14 PROCEDURE — 80053 COMPREHEN METABOLIC PANEL: CPT

## 2022-01-14 PROCEDURE — 82728 ASSAY OF FERRITIN: CPT

## 2022-01-14 PROCEDURE — 83540 ASSAY OF IRON: CPT

## 2022-01-14 PROCEDURE — 85025 COMPLETE CBC W/AUTO DIFF WBC: CPT

## 2022-01-14 PROCEDURE — 83550 IRON BINDING TEST: CPT

## 2022-01-14 PROCEDURE — 99214 OFFICE O/P EST MOD 30 MIN: CPT | Performed by: INTERNAL MEDICINE

## 2022-01-14 PROCEDURE — 82607 VITAMIN B-12: CPT

## 2022-01-14 PROCEDURE — 86300 IMMUNOASSAY TUMOR CA 15-3: CPT

## 2022-01-14 RX ORDER — CYANOCOBALAMIN 1000 UG/ML
1000 INJECTION INTRAMUSCULAR; SUBCUTANEOUS ONCE
Status: COMPLETED | OUTPATIENT
Start: 2022-01-14 | End: 2022-01-14

## 2022-01-14 RX ADMIN — CYANOCOBALAMIN 1000 MCG: 1000 INJECTION INTRAMUSCULAR; SUBCUTANEOUS at 09:39:00

## 2022-01-14 NOTE — PROGRESS NOTES
Cancer Center Progress Note    Patient Name: Kentrell Vidal   YOB: 1970   Medical Record Number: [id number]   CSN: [id number]   Attending Physician: Rivera De La Rosa M.D.    Referring Physician: Augustine Buenrostro MD      Date of Visit: 1/14/2022 78-82%. Was placed on 4 L of O2 and sats increased to 93% She was then emergently brought by ambulance to [facility] ED and was subsequently admitted. She was tested for COVID x 2 and negative.  Found to have ARF on CKD with CXR done showing a right sided Rfl: 11  •  ALPRAZolam 0.5 MG Oral Tab, Take 1 tablet (0.5 mg total) by mouth daily as needed for Sleep., Disp: 30 tablet, Rfl: 0  •  COVID-19 Specimen Collection Does not apply Kit, 1 strip by In Vitro route As Directed., Disp: , Rfl:   •  SITagliptin Dariel previously also on CYmbalta) 2012--seeing therapist   • DIABETES     diagnosed 2010-   • Diabetes mellitus ([facility] [address]     on meds   • Elevated serum creatinine     Hold ACE-I during Chemo---seeing Dr. Hakan Hicks   • Exposure to radiation     last radiation treatme 4/21/14 d/t chemo    Psychosocial History:  Social History    Socioeconomic History      Marital status:       Spouse name: Not on file      Number of children: Not on file      Years of education: Not on file      Highest education level: Not on fi appropriate  HEENT: EOMs intact. PERRL. Oropharynx clear. No thrush  Neck: No JVD. No palpable lymphadenopathy. Neck is supple. Lymphatics: There is no palpable lymphadenopathy   Chest: Clear to auscultation. Heart: Regular rate and rhythm. Abdomen:  So 0.9 cm    LVPW Diastolic Thickness   1.2 cm      0.6 - 1.0 / 0.6 - 0.9 cm    LV Ejection Fraction MOD   61.9 %      >= 55  %    LV Ejection Fraction MOD   63.6 %          LV Ejection Fraction MOD   59.2 %          LA Volume Index MOD BP     25 mL/m²     regurgitation. Pulmonic Valve: The pulmonic valve is poorly visualized. There is no evidence of     pulmonary stenosis or regurgitation by Doppler interrogation. RH Pressure:     The right heart pressure is unable to be accurately assessed due BP (mmHg):                95      /   52      Technical Quality:    Very technically difficult study due to patient body habitus      Contrast:     Definity     1.0     cc IV      Nurse Administering:       Kevin Hunt RN      MEASUREMENTS  (Male / Female) size and systolic function. Right Atrium:    The right atrium is normal in size. Mitral Valve:    Structurally normal valve. No prolapse seen. Mild mitral     regurgitation. No mitral stenosis. Aortic Valve:     The aortic valve is tricuspid abdomen and pelvis were obtained without IV contrast with 2-D coronal and sagittal reformatted post processed images.  Automated exposure control (AEC) was utilized for this study. COMPARISON: None.     FINDINGS: Cardiomegaly with mild CHF versus COVID-1 without obstructive uropathy. Bladder is decompressed with Grimaldo catheter balloon. Uterus and adnexa are unremarkable. There is no free fluid, collections, or pneumoperitoneum. Bowel loops are nondilated and nonobstructed. There is no diverticulitis. femoris, femoral, popliteal and posterior tibial veins are  compressible and without evidence of echogenic thrombus.     IMPRESSION:  Impression:  No evidence of deep venous thrombosis    Electronically Signed By: Edwardo Yuen MD on 9/28/2020 7:06 AM CDT worsening pulmonary edema versus multifocal pneumonia.     Electronically Signed By: Aryan Ni MD on 9/28/2020 7:53 AM CDT   Result Narrative     Portable Chest    History: Chest pain and respiratory distress    Technique: Single frontal view     Kota 01/14/2022    BAPERCENT 0.7 01/14/2022    NE 3.00 01/14/2022    LYMABS 0.75 (L) 01/14/2022    MOABSO 0.42 01/14/2022    EOABSO 0.14 01/14/2022    BAABSO 0.03 01/14/2022     Lab Results   Component Value Date     (H) 01/14/2022    BUN 25 (H) 01/14/20 Neuropathy stable on Neurotin and will continue. Also diabetic    4. CKD- per PCP    5. Tinea corporis right breast- azole.  She said she still had some at home    Labs 6 months    RTC 1 year    Saravanan Vickers MD  [facility]

## 2022-01-19 LAB — MMA: 0.32 UMOL/L

## 2022-02-08 ENCOUNTER — OFFICE VISIT (OUTPATIENT)
Dept: HEMATOLOGY/ONCOLOGY | Facility: HOSPITAL | Age: 52
End: 2022-02-08
Attending: INTERNAL MEDICINE
Payer: COMMERCIAL

## 2022-02-08 DIAGNOSIS — E53.8 B12 DEFICIENCY: Primary | ICD-10-CM

## 2022-02-08 DIAGNOSIS — E61.1 IRON DEFICIENCY: ICD-10-CM

## 2022-02-08 PROCEDURE — 96372 THER/PROPH/DIAG INJ SC/IM: CPT

## 2022-02-08 RX ORDER — CYANOCOBALAMIN 1000 UG/ML
1000 INJECTION INTRAMUSCULAR; SUBCUTANEOUS ONCE
Status: COMPLETED | OUTPATIENT
Start: 2022-02-08 | End: 2022-02-08

## 2022-02-08 RX ADMIN — CYANOCOBALAMIN 1000 MCG: 1000 INJECTION INTRAMUSCULAR; SUBCUTANEOUS at 13:00:00

## 2022-02-22 ENCOUNTER — OFFICE VISIT (OUTPATIENT)
Dept: HEMATOLOGY/ONCOLOGY | Facility: HOSPITAL | Age: 52
End: 2022-02-22
Attending: NURSE PRACTITIONER
Payer: COMMERCIAL

## 2022-02-22 ENCOUNTER — TELEPHONE (OUTPATIENT)
Dept: HEMATOLOGY/ONCOLOGY | Facility: HOSPITAL | Age: 52
End: 2022-02-22

## 2022-02-22 VITALS
OXYGEN SATURATION: 95 % | DIASTOLIC BLOOD PRESSURE: 90 MMHG | RESPIRATION RATE: 18 BRPM | HEART RATE: 97 BPM | SYSTOLIC BLOOD PRESSURE: 190 MMHG | BODY MASS INDEX: 61 KG/M2 | WEIGHT: 293 LBS | TEMPERATURE: 98 F

## 2022-02-22 DIAGNOSIS — T22.211A PARTIAL THICKNESS BURN OF RIGHT FOREARM, INITIAL ENCOUNTER: ICD-10-CM

## 2022-02-22 DIAGNOSIS — Z17.0 MALIGNANT NEOPLASM OF OVERLAPPING SITES OF RIGHT BREAST IN FEMALE, ESTROGEN RECEPTOR POSITIVE (HCC): Primary | ICD-10-CM

## 2022-02-22 DIAGNOSIS — C50.811 MALIGNANT NEOPLASM OF OVERLAPPING SITES OF RIGHT BREAST IN FEMALE, ESTROGEN RECEPTOR POSITIVE (HCC): Primary | ICD-10-CM

## 2022-02-22 PROCEDURE — 99214 OFFICE O/P EST MOD 30 MIN: CPT | Performed by: NURSE PRACTITIONER
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
## 2022-03-24 RX ORDER — TAMOXIFEN CITRATE 20 MG/1
TABLET ORAL
Qty: 30 TABLET | Refills: 5 | OUTPATIENT
Start: 2022-03-24

## 2022-03-24 RX ORDER — TAMOXIFEN CITRATE 20 MG/1
20 TABLET ORAL DAILY
Qty: 30 TABLET | Refills: 5 | Status: SHIPPED | OUTPATIENT
Start: 2022-03-24 | End: 2022-09-19

## 2022-04-26 DIAGNOSIS — G62.9 NEUROPATHY: ICD-10-CM

## 2022-04-27 RX ORDER — GABAPENTIN 300 MG/1
600 CAPSULE ORAL 2 TIMES DAILY
Qty: 120 CAPSULE | Refills: 5 | Status: SHIPPED | OUTPATIENT
Start: 2022-04-27 | End: 2022-12-16

## 2022-05-04 ENCOUNTER — TELEPHONE (OUTPATIENT)
Dept: SURGERY | Facility: CLINIC | Age: 52
End: 2022-05-04

## 2022-05-04 NOTE — TELEPHONE ENCOUNTER
HUI, you previously saw this patient with Duly and she now has an appt with you on 6/1, she is requesting a refill of Elmiron, please advise, thanks

## 2022-06-01 ENCOUNTER — OFFICE VISIT (OUTPATIENT)
Dept: HEMATOLOGY/ONCOLOGY | Facility: HOSPITAL | Age: 52
End: 2022-06-01
Attending: INTERNAL MEDICINE
Payer: COMMERCIAL

## 2022-06-01 DIAGNOSIS — E53.8 B12 DEFICIENCY: Primary | ICD-10-CM

## 2022-06-01 DIAGNOSIS — E61.1 IRON DEFICIENCY: ICD-10-CM

## 2022-06-01 PROCEDURE — 96372 THER/PROPH/DIAG INJ SC/IM: CPT

## 2022-06-01 RX ORDER — CYANOCOBALAMIN 1000 UG/ML
1000 INJECTION INTRAMUSCULAR; SUBCUTANEOUS ONCE
Status: COMPLETED | OUTPATIENT
Start: 2022-06-01 | End: 2022-06-01

## 2022-06-01 RX ADMIN — CYANOCOBALAMIN 1000 MCG: 1000 INJECTION INTRAMUSCULAR; SUBCUTANEOUS at 13:57:00

## 2022-06-01 NOTE — PROGRESS NOTES
Education Record    Learner:  Patient    Disease / Diagnosis: B12     Barriers / Limitations:  None   Comments:    Method:  Brief focused and Discussion   Comments:    General Topics:  Precautions and Side effects and symptom management   Comments:    Outcome:  Shows understanding   Comments:

## 2022-06-03 ENCOUNTER — VIRTUAL PHONE E/M (OUTPATIENT)
Dept: SURGERY | Facility: CLINIC | Age: 52
End: 2022-06-03
Payer: COMMERCIAL

## 2022-06-03 DIAGNOSIS — N30.10 INTERSTITIAL CYSTITIS: Primary | ICD-10-CM

## 2022-06-28 RX ORDER — CYANOCOBALAMIN 1000 UG/ML
1000 INJECTION INTRAMUSCULAR; SUBCUTANEOUS ONCE
Start: 2022-07-01 | End: 2022-07-01

## 2022-06-28 RX ORDER — CYANOCOBALAMIN 1000 UG/ML
1000 INJECTION INTRAMUSCULAR; SUBCUTANEOUS ONCE
Start: 2022-06-28 | End: 2022-06-28

## 2022-06-29 ENCOUNTER — APPOINTMENT (OUTPATIENT)
Dept: HEMATOLOGY/ONCOLOGY | Facility: HOSPITAL | Age: 52
End: 2022-06-29
Attending: INTERNAL MEDICINE
Payer: COMMERCIAL

## 2022-08-02 RX ORDER — PENTOSAN POLYSULFATE SODIUM 100 MG/1
100 CAPSULE, GELATIN COATED ORAL
Qty: 180 CAPSULE | Refills: 2 | Status: SHIPPED | OUTPATIENT
Start: 2022-08-02

## 2022-08-02 NOTE — TELEPHONE ENCOUNTER
RN approved the refill request of Elmiron and sent it to [facility] on 8/2/22. Patient was last seen on a video visit by KO on 6/3/22:    Recommendations:  Elmiron BID  Bladder irritants reviewed  Annual LFTs, eye examination  Follow up in 1 year.

## 2022-09-19 RX ORDER — TAMOXIFEN CITRATE 20 MG/1
TABLET ORAL
Qty: 30 TABLET | Refills: 5 | Status: SHIPPED | OUTPATIENT
Start: 2022-09-19 | End: 2023-03-16

## 2022-09-19 RX ORDER — FOLIC ACID 1 MG/1
TABLET ORAL
Qty: 30 TABLET | Refills: 11 | Status: SHIPPED | OUTPATIENT
Start: 2022-09-19 | End: 2023-09-05

## 2022-10-03 ENCOUNTER — HOSPITAL ENCOUNTER (OUTPATIENT)
Dept: MAMMOGRAPHY | Facility: HOSPITAL | Age: 52
Discharge: HOME | End: 2022-10-03
Attending: INTERNAL MEDICINE
Payer: COMMERCIAL

## 2022-10-03 DIAGNOSIS — C50.811 MALIGNANT NEOPLASM OF OVERLAPPING SITES OF RIGHT BREAST IN FEMALE, ESTROGEN RECEPTOR POSITIVE (HCC): ICD-10-CM

## 2022-10-03 DIAGNOSIS — Z17.0 MALIGNANT NEOPLASM OF OVERLAPPING SITES OF RIGHT BREAST IN FEMALE, ESTROGEN RECEPTOR POSITIVE (HCC): ICD-10-CM

## 2022-10-03 PROCEDURE — 77062 BREAST TOMOSYNTHESIS BI: CPT | Performed by: INTERNAL MEDICINE

## 2022-10-03 PROCEDURE — 77066 DX MAMMO INCL CAD BI: CPT | Performed by: INTERNAL MEDICINE

## 2023-04-28 RX ORDER — PENTOSAN POLYSULFATE SODIUM 100 MG/1
CAPSULE, GELATIN COATED ORAL
Qty: 60 CAPSULE | Refills: 1 | Status: SHIPPED | OUTPATIENT
Start: 2023-04-28

## 2023-06-13 RX ORDER — PENTOSAN POLYSULFATE SODIUM 100 MG/1
CAPSULE, GELATIN COATED ORAL
Qty: 60 CAPSULE | Refills: 0 | Status: SHIPPED | OUTPATIENT
Start: 2023-06-13

## 2023-06-13 NOTE — TELEPHONE ENCOUNTER
Pt's last appt was 6/3/22. No future appointments. Refill sent. Walkaboutt message sent to pt regarding follow up.

## 2023-07-10 RX ORDER — PENTOSAN POLYSULFATE SODIUM 100 MG/1
CAPSULE, GELATIN COATED ORAL
Qty: 60 CAPSULE | Refills: 0 | OUTPATIENT
Start: 2023-07-10

## 2023-07-10 NOTE — TELEPHONE ENCOUNTER
Pt's last OV was virtual phone on 6/3/22. No future appointments. Pt was notified on 6/12/23 regarding the need for a follow up. Refill denied. Campanjat message sent.

## 2023-07-21 NOTE — PROGRESS NOTES
Education Record    Learner:  Patient    Disease / Diagnosis:B12 deficiency      Barriers / Limitations:  None   Comments:    Method:  Brief focused   Comments:    General Topics:  Infection, Medication, Pain, Precautions, Procedure, Side effects and sympt Met with patient and discussed that their physician has ordered a referral to our outpatient Phase II Cardiac Rehabilitation program. Reviewed the benefits of cardiac rehabilitation based on their diagnosis and personal risk factors. Patient demonstrates mild interest in Cardiac Rehabilitation at this time. Cardiac Rehabilitation brochure provided to patient/family. The Cardiac Rehabilitation Program has been provided the patient's referral information and pertinent patient details and history. The patient may call [address] [place] [address] at [phone] for additional information or questions. Contact information for [address] [place] [address] and other choices close to the patient's residence have been provided in the discharge instructions.  Thank you for the referral.

## 2023-07-27 RX ORDER — PENTOSAN POLYSULFATE SODIUM 100 MG/1
CAPSULE, GELATIN COATED ORAL
Qty: 60 CAPSULE | Refills: 0 | Status: SHIPPED | OUTPATIENT
Start: 2023-07-27

## 2023-08-24 ENCOUNTER — HOSPITAL ENCOUNTER (INPATIENT)
Facility: HOSPITAL | Age: 53
LOS: 7 days | Discharge: HOME HEALTH | End: 2023-08-31
Attending: STUDENT IN AN ORGANIZED HEALTH CARE EDUCATION/TRAINING PROGRAM | Admitting: HOSPITALIST
Payer: COMMERCIAL

## 2023-08-24 ENCOUNTER — APPOINTMENT (OUTPATIENT)
Dept: CV DIAGNOSTICS | Facility: HOSPITAL | Age: 53
End: 2023-08-24
Attending: HOSPITALIST
Payer: COMMERCIAL

## 2023-08-24 ENCOUNTER — APPOINTMENT (OUTPATIENT)
Dept: CT IMAGING | Facility: HOSPITAL | Age: 53
End: 2023-08-24
Attending: HOSPITALIST
Payer: COMMERCIAL

## 2023-08-24 ENCOUNTER — APPOINTMENT (OUTPATIENT)
Dept: GENERAL RADIOLOGY | Facility: HOSPITAL | Age: 53
End: 2023-08-24
Payer: COMMERCIAL

## 2023-08-24 ENCOUNTER — APPOINTMENT (OUTPATIENT)
Dept: GENERAL RADIOLOGY | Facility: HOSPITAL | Age: 53
End: 2023-08-24
Attending: STUDENT IN AN ORGANIZED HEALTH CARE EDUCATION/TRAINING PROGRAM
Payer: COMMERCIAL

## 2023-08-24 ENCOUNTER — APPOINTMENT (OUTPATIENT)
Dept: NUCLEAR MEDICINE | Facility: HOSPITAL | Age: 53
End: 2023-08-24
Attending: STUDENT IN AN ORGANIZED HEALTH CARE EDUCATION/TRAINING PROGRAM
Payer: COMMERCIAL

## 2023-08-24 DIAGNOSIS — E87.1 HYPONATREMIA: ICD-10-CM

## 2023-08-24 DIAGNOSIS — I50.23 ACUTE ON CHRONIC SYSTOLIC CONGESTIVE HEART FAILURE (HCC): ICD-10-CM

## 2023-08-24 DIAGNOSIS — R09.02 HYPOXIA: ICD-10-CM

## 2023-08-24 DIAGNOSIS — A41.9 SEPSIS SECONDARY TO UTI: Primary | ICD-10-CM

## 2023-08-24 DIAGNOSIS — N39.0 SEPSIS SECONDARY TO UTI: Primary | ICD-10-CM

## 2023-08-24 DIAGNOSIS — N17.9 ACUTE KIDNEY INJURY (HCC): ICD-10-CM

## 2023-08-24 DIAGNOSIS — M1A.0790 IDIOPATHIC CHRONIC GOUT OF FOOT WITHOUT TOPHUS, UNSPECIFIED LATERALITY: ICD-10-CM

## 2023-08-24 PROBLEM — D64.9 ANEMIA: Status: ACTIVE | Noted: 2023-08-24

## 2023-08-24 LAB
ALBUMIN SERPL-MCNC: 2.9 G/DL (ref 3.4–5)
ALBUMIN/GLOB SERPL: 0.6 {RATIO} (ref 1–2)
ALP LIVER SERPL-CCNC: 47 U/L
ALT SERPL-CCNC: 20 U/L
ANION GAP SERPL CALC-SCNC: 9 MMOL/L (ref 0–18)
APTT PPP: 24.6 SECONDS (ref 23.3–35.6)
ARTERIAL PATENCY WRIST A: POSITIVE
AST SERPL-CCNC: 34 U/L (ref 15–37)
BASE EXCESS BLDA CALC-SCNC: -2.6 MMOL/L (ref ?–2)
BASOPHILS # BLD AUTO: 0.03 X10(3) UL (ref 0–0.2)
BASOPHILS NFR BLD AUTO: 0.3 %
BILIRUB SERPL-MCNC: 1 MG/DL (ref 0.1–2)
BILIRUB UR QL STRIP.AUTO: NEGATIVE
BODY TEMPERATURE: 98.6 F
BUN BLD-MCNC: 46 MG/DL (ref 7–18)
CA-I BLD-SCNC: 1.2 MMOL/L (ref 0.95–1.32)
CALCIUM BLD-MCNC: 9.7 MG/DL (ref 8.5–10.1)
CHLORIDE SERPL-SCNC: 98 MMOL/L (ref 98–112)
CHOLEST SERPL-MCNC: 138 MG/DL (ref ?–200)
CO2 SERPL-SCNC: 23 MMOL/L (ref 21–32)
COHGB MFR BLD: 2.4 % SAT (ref 0–3)
COLOR UR AUTO: YELLOW
CREAT BLD-MCNC: 3.99 MG/DL
EGFRCR SERPLBLD CKD-EPI 2021: 13 ML/MIN/1.73M2 (ref 60–?)
EOSINOPHIL # BLD AUTO: 0.02 X10(3) UL (ref 0–0.7)
EOSINOPHIL NFR BLD AUTO: 0.2 %
ERYTHROCYTE [DISTWIDTH] IN BLOOD BY AUTOMATED COUNT: 16.4 %
EST. AVERAGE GLUCOSE BLD GHB EST-MCNC: 183 MG/DL (ref 68–126)
EXPIRATORY PRESSURE: 6 CM H2O
FIO2: 60 %
FLUAV + FLUBV RNA SPEC NAA+PROBE: NEGATIVE
FLUAV + FLUBV RNA SPEC NAA+PROBE: NEGATIVE
GLOBULIN PLAS-MCNC: 5.1 G/DL (ref 2.8–4.4)
GLUCOSE BLD-MCNC: 273 MG/DL (ref 70–99)
GLUCOSE BLD-MCNC: 339 MG/DL (ref 70–99)
GLUCOSE BLD-MCNC: 350 MG/DL (ref 70–99)
GLUCOSE UR STRIP.AUTO-MCNC: NORMAL MG/DL
HBA1C MFR BLD: 8 % (ref ?–5.7)
HCO3 BLDA-SCNC: 22.9 MEQ/L (ref 21–27)
HCT VFR BLD AUTO: 31.4 %
HDLC SERPL-MCNC: 31 MG/DL (ref 40–59)
HGB BLD-MCNC: 10.1 G/DL
HGB BLD-MCNC: 8.6 G/DL
IMM GRANULOCYTES # BLD AUTO: 0.07 X10(3) UL (ref 0–1)
IMM GRANULOCYTES NFR BLD: 0.7 %
INR BLD: 1.22 (ref 0.85–1.16)
INSP PRESSURE: 12 CM H2O
KETONES UR STRIP.AUTO-MCNC: NEGATIVE MG/DL
LACTATE BLD-SCNC: 2.6 MMOL/L (ref 0.5–2)
LACTATE SERPL-SCNC: 2.7 MMOL/L (ref 0.4–2)
LACTATE SERPL-SCNC: 2.7 MMOL/L (ref 0.4–2)
LACTATE SERPL-SCNC: 3.6 MMOL/L (ref 0.4–2)
LDLC SERPL CALC-MCNC: 47 MG/DL (ref ?–100)
LEUKOCYTE ESTERASE UR QL STRIP.AUTO: 500
LYMPHOCYTES # BLD AUTO: 0.41 X10(3) UL (ref 1–4)
LYMPHOCYTES NFR BLD AUTO: 4 %
MCH RBC QN AUTO: 31.7 PG (ref 26–34)
MCHC RBC AUTO-ENTMCNC: 32.2 G/DL (ref 31–37)
MCV RBC AUTO: 98.4 FL
METHGB MFR BLD: 1.4 % SAT (ref 0.4–1.5)
MONOCYTES # BLD AUTO: 0.32 X10(3) UL (ref 0.1–1)
MONOCYTES NFR BLD AUTO: 3.1 %
NEUTROPHILS # BLD AUTO: 9.38 X10 (3) UL (ref 1.5–7.7)
NEUTROPHILS # BLD AUTO: 9.38 X10(3) UL (ref 1.5–7.7)
NEUTROPHILS NFR BLD AUTO: 91.7 %
NITRITE UR QL STRIP.AUTO: NEGATIVE
NONHDLC SERPL-MCNC: 107 MG/DL (ref ?–130)
NT-PROBNP SERPL-MCNC: 2825 PG/ML (ref ?–125)
OSMOLALITY SERPL CALC.SUM OF ELEC: 295 MOSM/KG (ref 275–295)
OXYHGB MFR BLDA: 95.8 % (ref 92–100)
PCO2 BLDA: 40 MM HG (ref 35–45)
PH BLDA: 7.36 [PH] (ref 7.35–7.45)
PH UR STRIP.AUTO: 5 [PH] (ref 5–8)
PLATELET # BLD AUTO: 102 10(3)UL (ref 150–450)
PO2 BLDA: 125 MM HG (ref 80–100)
POTASSIUM BLD-SCNC: 5.3 MMOL/L (ref 3.6–5.1)
POTASSIUM SERPL-SCNC: 5.3 MMOL/L (ref 3.5–5.1)
PROT SERPL-MCNC: 8 G/DL (ref 6.4–8.2)
PROT UR STRIP.AUTO-MCNC: 100 MG/DL
PROTHROMBIN TIME: 15.4 SECONDS (ref 11.6–14.8)
RBC # BLD AUTO: 3.19 X10(6)UL
RBC #/AREA URNS AUTO: >10 /HPF
RSV RNA SPEC NAA+PROBE: NEGATIVE
SARS-COV-2 RNA RESP QL NAA+PROBE: NOT DETECTED
SODIUM BLD-SCNC: 132 MMOL/L (ref 135–145)
SODIUM SERPL-SCNC: 130 MMOL/L (ref 136–145)
SP GR UR STRIP.AUTO: 1.03 (ref 1–1.03)
TRIGL SERPL-MCNC: 406 MG/DL (ref 30–149)
TROPONIN I HIGH SENSITIVITY: 1207 NG/L
TROPONIN I HIGH SENSITIVITY: 2611 NG/L
UROBILINOGEN UR STRIP.AUTO-MCNC: NORMAL MG/DL
VLDLC SERPL CALC-MCNC: 57 MG/DL (ref 0–30)
WBC # BLD AUTO: 10.2 X10(3) UL (ref 4–11)
WBC #/AREA URNS AUTO: >50 /HPF
WBC CLUMPS UR QL AUTO: PRESENT /HPF

## 2023-08-24 PROCEDURE — 71045 X-RAY EXAM CHEST 1 VIEW: CPT

## 2023-08-24 PROCEDURE — 5A09357 ASSISTANCE WITH RESPIRATORY VENTILATION, LESS THAN 24 CONSECUTIVE HOURS, CONTINUOUS POSITIVE AIRWAY PRESSURE: ICD-10-PCS | Performed by: HOSPITALIST

## 2023-08-24 PROCEDURE — 74176 CT ABD & PELVIS W/O CONTRAST: CPT | Performed by: HOSPITALIST

## 2023-08-24 PROCEDURE — 5A0955A ASSISTANCE WITH RESPIRATORY VENTILATION, GREATER THAN 96 CONSECUTIVE HOURS, HIGH NASAL FLOW/VELOCITY: ICD-10-PCS | Performed by: HOSPITALIST

## 2023-08-24 PROCEDURE — 71045 X-RAY EXAM CHEST 1 VIEW: CPT | Performed by: STUDENT IN AN ORGANIZED HEALTH CARE EDUCATION/TRAINING PROGRAM

## 2023-08-24 PROCEDURE — B548ZZA ULTRASONOGRAPHY OF SUPERIOR VENA CAVA, GUIDANCE: ICD-10-PCS | Performed by: HOSPITALIST

## 2023-08-24 PROCEDURE — 78582 LUNG VENTILAT&PERFUS IMAGING: CPT | Performed by: STUDENT IN AN ORGANIZED HEALTH CARE EDUCATION/TRAINING PROGRAM

## 2023-08-24 PROCEDURE — 02HV33Z INSERTION OF INFUSION DEVICE INTO SUPERIOR VENA CAVA, PERCUTANEOUS APPROACH: ICD-10-PCS | Performed by: HOSPITALIST

## 2023-08-24 PROCEDURE — 71250 CT THORAX DX C-: CPT | Performed by: HOSPITALIST

## 2023-08-24 RX ORDER — PROCHLORPERAZINE EDISYLATE 5 MG/ML
5 INJECTION INTRAMUSCULAR; INTRAVENOUS EVERY 8 HOURS PRN
Status: DISCONTINUED | OUTPATIENT
Start: 2023-08-24 | End: 2023-08-31

## 2023-08-24 RX ORDER — MEPERIDINE HYDROCHLORIDE 25 MG/ML
12.5 INJECTION INTRAMUSCULAR; INTRAVENOUS; SUBCUTANEOUS ONCE
Status: COMPLETED | OUTPATIENT
Start: 2023-08-24 | End: 2023-08-24

## 2023-08-24 RX ORDER — SODIUM CHLORIDE 9 MG/ML
INJECTION, SOLUTION INTRAVENOUS CONTINUOUS
Status: DISCONTINUED | OUTPATIENT
Start: 2023-08-24 | End: 2023-08-25

## 2023-08-24 RX ORDER — ONDANSETRON HYDROCHLORIDE 8 MG/1
8 TABLET, FILM COATED ORAL EVERY 8 HOURS PRN
COMMUNITY

## 2023-08-24 RX ORDER — BISACODYL 10 MG
10 SUPPOSITORY, RECTAL RECTAL
Status: DISCONTINUED | OUTPATIENT
Start: 2023-08-24 | End: 2023-08-31

## 2023-08-24 RX ORDER — ACETAMINOPHEN 10 MG/ML
1000 INJECTION, SOLUTION INTRAVENOUS EVERY 6 HOURS PRN
Status: DISCONTINUED | OUTPATIENT
Start: 2023-08-24 | End: 2023-08-28

## 2023-08-24 RX ORDER — ONDANSETRON 2 MG/ML
4 INJECTION INTRAMUSCULAR; INTRAVENOUS EVERY 6 HOURS PRN
Status: DISCONTINUED | OUTPATIENT
Start: 2023-08-24 | End: 2023-08-31

## 2023-08-24 RX ORDER — ACETAMINOPHEN 500 MG
500 TABLET ORAL EVERY 4 HOURS PRN
Status: DISCONTINUED | OUTPATIENT
Start: 2023-08-24 | End: 2023-08-31

## 2023-08-24 RX ORDER — FUROSEMIDE 10 MG/ML
40 INJECTION INTRAMUSCULAR; INTRAVENOUS ONCE
Status: DISCONTINUED | OUTPATIENT
Start: 2023-08-24 | End: 2023-08-24

## 2023-08-24 RX ORDER — CLONIDINE HYDROCHLORIDE 0.1 MG/1
0.1 TABLET ORAL 2 TIMES DAILY
Status: DISCONTINUED | OUTPATIENT
Start: 2023-08-24 | End: 2023-08-24

## 2023-08-24 RX ORDER — TAMOXIFEN CITRATE 10 MG/1
20 TABLET ORAL DAILY
Status: DISCONTINUED | OUTPATIENT
Start: 2023-08-24 | End: 2023-08-31

## 2023-08-24 RX ORDER — DEXTROSE MONOHYDRATE 25 G/50ML
50 INJECTION, SOLUTION INTRAVENOUS
Status: DISCONTINUED | OUTPATIENT
Start: 2023-08-24 | End: 2023-08-31

## 2023-08-24 RX ORDER — ASPIRIN 81 MG/1
81 TABLET ORAL DAILY
Status: DISCONTINUED | OUTPATIENT
Start: 2023-08-24 | End: 2023-08-26

## 2023-08-24 RX ORDER — SENNOSIDES 8.6 MG
17.2 TABLET ORAL NIGHTLY PRN
Status: DISCONTINUED | OUTPATIENT
Start: 2023-08-24 | End: 2023-08-31

## 2023-08-24 RX ORDER — NICOTINE POLACRILEX 4 MG
15 LOZENGE BUCCAL
Status: DISCONTINUED | OUTPATIENT
Start: 2023-08-24 | End: 2023-08-31

## 2023-08-24 RX ORDER — NICOTINE POLACRILEX 4 MG
30 LOZENGE BUCCAL
Status: DISCONTINUED | OUTPATIENT
Start: 2023-08-24 | End: 2023-08-31

## 2023-08-24 RX ORDER — ALLOPURINOL 300 MG/1
300 TABLET ORAL DAILY
Status: DISCONTINUED | OUTPATIENT
Start: 2023-08-24 | End: 2023-08-24

## 2023-08-24 RX ORDER — HEPARIN SODIUM 5000 [USP'U]/ML
5000 INJECTION, SOLUTION INTRAVENOUS; SUBCUTANEOUS EVERY 8 HOURS SCHEDULED
Status: DISCONTINUED | OUTPATIENT
Start: 2023-08-24 | End: 2023-08-26

## 2023-08-24 RX ORDER — ACETAMINOPHEN 500 MG
1000 TABLET ORAL ONCE
Status: COMPLETED | OUTPATIENT
Start: 2023-08-24 | End: 2023-08-24

## 2023-08-24 RX ORDER — FOLIC ACID 1 MG/1
1 TABLET ORAL DAILY
Status: DISCONTINUED | OUTPATIENT
Start: 2023-08-24 | End: 2023-08-31

## 2023-08-24 RX ORDER — MELATONIN
3 NIGHTLY PRN
Status: DISCONTINUED | OUTPATIENT
Start: 2023-08-24 | End: 2023-08-31

## 2023-08-24 RX ORDER — LIDOCAINE HYDROCHLORIDE 10 MG/ML
5 INJECTION, SOLUTION EPIDURAL; INFILTRATION; INTRACAUDAL; PERINEURAL
Status: COMPLETED | OUTPATIENT
Start: 2023-08-24 | End: 2023-08-24

## 2023-08-24 RX ORDER — POLYETHYLENE GLYCOL 3350 17 G/17G
17 POWDER, FOR SOLUTION ORAL DAILY PRN
Status: DISCONTINUED | OUTPATIENT
Start: 2023-08-24 | End: 2023-08-31

## 2023-08-24 RX ORDER — MEPERIDINE HYDROCHLORIDE 25 MG/ML
INJECTION INTRAMUSCULAR; INTRAVENOUS; SUBCUTANEOUS
Status: COMPLETED
Start: 2023-08-24 | End: 2023-08-24

## 2023-08-24 RX ORDER — BUMETANIDE 0.25 MG/ML
2 INJECTION INTRAMUSCULAR; INTRAVENOUS ONCE
Status: COMPLETED | OUTPATIENT
Start: 2023-08-24 | End: 2023-08-24

## 2023-08-25 ENCOUNTER — APPOINTMENT (OUTPATIENT)
Dept: INTERVENTIONAL RADIOLOGY/VASCULAR | Facility: HOSPITAL | Age: 53
End: 2023-08-25
Attending: INTERNAL MEDICINE
Payer: COMMERCIAL

## 2023-08-25 ENCOUNTER — APPOINTMENT (OUTPATIENT)
Dept: GENERAL RADIOLOGY | Facility: HOSPITAL | Age: 53
End: 2023-08-25
Attending: HOSPITALIST
Payer: COMMERCIAL

## 2023-08-25 ENCOUNTER — APPOINTMENT (OUTPATIENT)
Dept: CV DIAGNOSTICS | Facility: HOSPITAL | Age: 53
End: 2023-08-25
Attending: HOSPITALIST
Payer: COMMERCIAL

## 2023-08-25 LAB
ALBUMIN SERPL-MCNC: 2.7 G/DL (ref 3.4–5)
ALBUMIN SERPL-MCNC: 2.7 G/DL (ref 3.4–5)
ALBUMIN/GLOB SERPL: 0.5 {RATIO} (ref 1–2)
ALP LIVER SERPL-CCNC: 43 U/L
ALT SERPL-CCNC: 18 U/L
ANION GAP SERPL CALC-SCNC: 8 MMOL/L (ref 0–18)
ANION GAP SERPL CALC-SCNC: 8 MMOL/L (ref 0–18)
AST SERPL-CCNC: 25 U/L (ref 15–37)
ATRIAL RATE: 117 BPM
BASOPHILS # BLD AUTO: 0.03 X10(3) UL (ref 0–0.2)
BASOPHILS NFR BLD AUTO: 0.3 %
BILIRUB SERPL-MCNC: 0.7 MG/DL (ref 0.1–2)
BUN BLD-MCNC: 53 MG/DL (ref 7–18)
BUN BLD-MCNC: 53 MG/DL (ref 7–18)
CALCIUM BLD-MCNC: 8.8 MG/DL (ref 8.5–10.1)
CALCIUM BLD-MCNC: 8.8 MG/DL (ref 8.5–10.1)
CHLORIDE SERPL-SCNC: 101 MMOL/L (ref 98–112)
CHLORIDE SERPL-SCNC: 101 MMOL/L (ref 98–112)
CHOLEST SERPL-MCNC: 117 MG/DL (ref ?–200)
CO2 SERPL-SCNC: 24 MMOL/L (ref 21–32)
CO2 SERPL-SCNC: 24 MMOL/L (ref 21–32)
CREAT BLD-MCNC: 5.14 MG/DL
CREAT BLD-MCNC: 5.14 MG/DL
CREAT UR-SCNC: 167 MG/DL
EGFRCR SERPLBLD CKD-EPI 2021: 9 ML/MIN/1.73M2 (ref 60–?)
EGFRCR SERPLBLD CKD-EPI 2021: 9 ML/MIN/1.73M2 (ref 60–?)
EOSINOPHIL # BLD AUTO: 0.01 X10(3) UL (ref 0–0.7)
EOSINOPHIL NFR BLD AUTO: 0.1 %
ERYTHROCYTE [DISTWIDTH] IN BLOOD BY AUTOMATED COUNT: 17 %
GLOBULIN PLAS-MCNC: 5.1 G/DL (ref 2.8–4.4)
GLUCOSE BLD-MCNC: 180 MG/DL (ref 70–99)
GLUCOSE BLD-MCNC: 186 MG/DL (ref 70–99)
GLUCOSE BLD-MCNC: 193 MG/DL (ref 70–99)
GLUCOSE BLD-MCNC: 193 MG/DL (ref 70–99)
GLUCOSE BLD-MCNC: 194 MG/DL (ref 70–99)
GLUCOSE BLD-MCNC: 218 MG/DL (ref 70–99)
HCT VFR BLD AUTO: 30.4 %
HDLC SERPL-MCNC: 20 MG/DL (ref 40–59)
HGB BLD-MCNC: 9.7 G/DL
IMM GRANULOCYTES # BLD AUTO: 0.09 X10(3) UL (ref 0–1)
IMM GRANULOCYTES NFR BLD: 0.9 %
LDLC SERPL CALC-MCNC: 41 MG/DL (ref ?–100)
LYMPHOCYTES # BLD AUTO: 0.51 X10(3) UL (ref 1–4)
LYMPHOCYTES NFR BLD AUTO: 5.1 %
MCH RBC QN AUTO: 32 PG (ref 26–34)
MCHC RBC AUTO-ENTMCNC: 31.9 G/DL (ref 31–37)
MCV RBC AUTO: 100.3 FL
MONOCYTES # BLD AUTO: 0.68 X10(3) UL (ref 0.1–1)
MONOCYTES NFR BLD AUTO: 6.8 %
MRSA DNA SPEC QL NAA+PROBE: NEGATIVE
NEUTROPHILS # BLD AUTO: 8.71 X10 (3) UL (ref 1.5–7.7)
NEUTROPHILS # BLD AUTO: 8.71 X10(3) UL (ref 1.5–7.7)
NEUTROPHILS NFR BLD AUTO: 86.8 %
NONHDLC SERPL-MCNC: 97 MG/DL (ref ?–130)
OSMOLALITY SERPL CALC.SUM OF ELEC: 296 MOSM/KG (ref 275–295)
OSMOLALITY SERPL CALC.SUM OF ELEC: 296 MOSM/KG (ref 275–295)
P AXIS: -18 DEGREES
P-R INTERVAL: 168 MS
PHOSPHATE SERPL-MCNC: 4.2 MG/DL (ref 2.5–4.9)
PLATELET # BLD AUTO: 96 10(3)UL (ref 150–450)
POTASSIUM SERPL-SCNC: 5.1 MMOL/L (ref 3.5–5.1)
POTASSIUM SERPL-SCNC: 5.1 MMOL/L (ref 3.5–5.1)
PROT SERPL-MCNC: 7.8 G/DL (ref 6.4–8.2)
Q-T INTERVAL: 330 MS
QRS DURATION: 90 MS
QTC CALCULATION (BEZET): 460 MS
R AXIS: 51 DEGREES
RBC # BLD AUTO: 3.03 X10(6)UL
SODIUM SERPL-SCNC: 133 MMOL/L (ref 136–145)
SODIUM SERPL-SCNC: 133 MMOL/L (ref 136–145)
SODIUM SERPL-SCNC: 41 MMOL/L
T AXIS: -1 DEGREES
TRIGL SERPL-MCNC: 381 MG/DL (ref 30–149)
TROPONIN I HIGH SENSITIVITY: 2405 NG/L
UUN UR-MCNC: 122 MG/DL
VENTRICULAR RATE: 117 BPM
VLDLC SERPL CALC-MCNC: 53 MG/DL (ref 0–30)
WBC # BLD AUTO: 10 X10(3) UL (ref 4–11)

## 2023-08-25 PROCEDURE — 30233J1 TRANSFUSION OF NONAUTOLOGOUS SERUM ALBUMIN INTO PERIPHERAL VEIN, PERCUTANEOUS APPROACH: ICD-10-PCS | Performed by: INTERNAL MEDICINE

## 2023-08-25 PROCEDURE — 93306 TTE W/DOPPLER COMPLETE: CPT | Performed by: HOSPITALIST

## 2023-08-25 PROCEDURE — 99291 CRITICAL CARE FIRST HOUR: CPT | Performed by: INTERNAL MEDICINE

## 2023-08-25 PROCEDURE — 71045 X-RAY EXAM CHEST 1 VIEW: CPT | Performed by: HOSPITALIST

## 2023-08-25 PROCEDURE — B548ZZA ULTRASONOGRAPHY OF SUPERIOR VENA CAVA, GUIDANCE: ICD-10-PCS | Performed by: STUDENT IN AN ORGANIZED HEALTH CARE EDUCATION/TRAINING PROGRAM

## 2023-08-25 PROCEDURE — 02HV33Z INSERTION OF INFUSION DEVICE INTO SUPERIOR VENA CAVA, PERCUTANEOUS APPROACH: ICD-10-PCS | Performed by: STUDENT IN AN ORGANIZED HEALTH CARE EDUCATION/TRAINING PROGRAM

## 2023-08-25 RX ORDER — LIDOCAINE HYDROCHLORIDE 10 MG/ML
INJECTION, SOLUTION INFILTRATION; PERINEURAL
Status: COMPLETED
Start: 2023-08-25 | End: 2023-08-25

## 2023-08-25 RX ORDER — ALBUMIN (HUMAN) 12.5 G/50ML
25 SOLUTION INTRAVENOUS EVERY 6 HOURS
Status: COMPLETED | OUTPATIENT
Start: 2023-08-25 | End: 2023-08-26

## 2023-08-25 RX ORDER — HEPARIN SODIUM 5000 [USP'U]/ML
INJECTION, SOLUTION INTRAVENOUS; SUBCUTANEOUS
Status: COMPLETED
Start: 2023-08-25 | End: 2023-08-25

## 2023-08-25 RX ORDER — HEPARIN SODIUM 1000 [USP'U]/ML
1.5 INJECTION, SOLUTION INTRAVENOUS; SUBCUTANEOUS AS NEEDED
Status: DISCONTINUED | OUTPATIENT
Start: 2023-08-25 | End: 2023-08-31

## 2023-08-25 RX ORDER — TRAMADOL HYDROCHLORIDE 50 MG/1
50 TABLET ORAL EVERY 12 HOURS PRN
Status: DISCONTINUED | OUTPATIENT
Start: 2023-08-25 | End: 2023-08-31

## 2023-08-26 LAB
ALBUMIN SERPL-MCNC: 3 G/DL (ref 3.4–5)
ALBUMIN/GLOB SERPL: 0.7 {RATIO} (ref 1–2)
ALP LIVER SERPL-CCNC: 43 U/L
ALT SERPL-CCNC: 20 U/L
ANION GAP SERPL CALC-SCNC: 5 MMOL/L (ref 0–18)
ARTERIAL PATENCY WRIST A: POSITIVE
AST SERPL-CCNC: 26 U/L (ref 15–37)
BASE EXCESS BLDA CALC-SCNC: -0.3 MMOL/L (ref ?–2)
BILIRUB SERPL-MCNC: 0.7 MG/DL (ref 0.1–2)
BODY TEMPERATURE: 98.6 F
BUN BLD-MCNC: 43 MG/DL (ref 7–18)
C DIFF TOX B STL QL: NEGATIVE
CA-I BLD-SCNC: 1.2 MMOL/L (ref 0.95–1.32)
CALCIUM BLD-MCNC: 8.6 MG/DL (ref 8.5–10.1)
CHLORIDE SERPL-SCNC: 104 MMOL/L (ref 98–112)
CO2 SERPL-SCNC: 25 MMOL/L (ref 21–32)
COHGB MFR BLD: 2.1 % SAT (ref 0–3)
CREAT BLD-MCNC: 3.27 MG/DL
EGFRCR SERPLBLD CKD-EPI 2021: 16 ML/MIN/1.73M2 (ref 60–?)
ERYTHROCYTE [DISTWIDTH] IN BLOOD BY AUTOMATED COUNT: 16.5 %
FIO2: 50 %
GLOBULIN PLAS-MCNC: 4.6 G/DL (ref 2.8–4.4)
GLUCOSE BLD-MCNC: 131 MG/DL (ref 70–99)
GLUCOSE BLD-MCNC: 135 MG/DL (ref 70–99)
GLUCOSE BLD-MCNC: 140 MG/DL (ref 70–99)
GLUCOSE BLD-MCNC: 157 MG/DL (ref 70–99)
GLUCOSE BLD-MCNC: 171 MG/DL (ref 70–99)
HCO3 BLDA-SCNC: 24.7 MEQ/L (ref 21–27)
HCT VFR BLD AUTO: 26.3 %
HGB BLD-MCNC: 7.7 G/DL
HGB BLD-MCNC: 8.3 G/DL
L/M: 25 L/MIN
LACTATE BLD-SCNC: 0.7 MMOL/L (ref 0.5–2)
MCH RBC QN AUTO: 31.9 PG (ref 26–34)
MCHC RBC AUTO-ENTMCNC: 31.6 G/DL (ref 31–37)
MCV RBC AUTO: 101.2 FL
METHGB MFR BLD: 1.1 % SAT (ref 0.4–1.5)
OSMOLALITY SERPL CALC.SUM OF ELEC: 291 MOSM/KG (ref 275–295)
OXYHGB MFR BLDA: 95.3 % (ref 92–100)
PCO2 BLDA: 46 MM HG (ref 35–45)
PH BLDA: 7.35 [PH] (ref 7.35–7.45)
PHOSPHATE SERPL-MCNC: 2.4 MG/DL (ref 2.5–4.9)
PLATELET # BLD AUTO: 65 10(3)UL (ref 150–450)
PO2 BLDA: 82 MM HG (ref 80–100)
POTASSIUM BLD-SCNC: 4.2 MMOL/L (ref 3.6–5.1)
POTASSIUM SERPL-SCNC: 4.2 MMOL/L (ref 3.5–5.1)
PROT SERPL-MCNC: 7.6 G/DL (ref 6.4–8.2)
RBC # BLD AUTO: 2.6 X10(6)UL
SODIUM BLD-SCNC: 135 MMOL/L (ref 135–145)
SODIUM SERPL-SCNC: 134 MMOL/L (ref 136–145)
WBC # BLD AUTO: 6.3 X10(3) UL (ref 4–11)

## 2023-08-26 PROCEDURE — 5A1D90Z PERFORMANCE OF URINARY FILTRATION, CONTINUOUS, GREATER THAN 18 HOURS PER DAY: ICD-10-PCS | Performed by: INTERNAL MEDICINE

## 2023-08-26 PROCEDURE — 99291 CRITICAL CARE FIRST HOUR: CPT | Performed by: INTERNAL MEDICINE

## 2023-08-26 RX ORDER — FAMOTIDINE 10 MG/ML
20 INJECTION, SOLUTION INTRAVENOUS DAILY
Status: DISCONTINUED | OUTPATIENT
Start: 2023-08-26 | End: 2023-08-29

## 2023-08-26 RX ORDER — ALPRAZOLAM 0.5 MG/1
0.5 TABLET ORAL
Status: DISCONTINUED | OUTPATIENT
Start: 2023-08-26 | End: 2023-08-31

## 2023-08-26 RX ORDER — FAMOTIDINE 20 MG/1
20 TABLET, FILM COATED ORAL DAILY
Status: DISCONTINUED | OUTPATIENT
Start: 2023-08-26 | End: 2023-08-29

## 2023-08-27 ENCOUNTER — APPOINTMENT (OUTPATIENT)
Dept: CT IMAGING | Facility: HOSPITAL | Age: 53
End: 2023-08-27
Attending: NURSE PRACTITIONER
Payer: COMMERCIAL

## 2023-08-27 LAB
ALBUMIN SERPL-MCNC: 2.9 G/DL (ref 3.4–5)
ALBUMIN/GLOB SERPL: 0.6 {RATIO} (ref 1–2)
ALP LIVER SERPL-CCNC: 50 U/L
ALT SERPL-CCNC: 18 U/L
ANION GAP SERPL CALC-SCNC: 8 MMOL/L (ref 0–18)
AST SERPL-CCNC: 20 U/L (ref 15–37)
BILIRUB SERPL-MCNC: 0.8 MG/DL (ref 0.1–2)
BLACTX-M ISLT/SPM QL: DETECTED
BUN BLD-MCNC: 37 MG/DL (ref 7–18)
CALCIUM BLD-MCNC: 9 MG/DL (ref 8.5–10.1)
CHLORIDE SERPL-SCNC: 105 MMOL/L (ref 98–112)
CO2 SERPL-SCNC: 26 MMOL/L (ref 21–32)
CREAT BLD-MCNC: 2.21 MG/DL
E COLI DNA BLD POS QL NAA+NON-PROBE: DETECTED
EGFRCR SERPLBLD CKD-EPI 2021: 26 ML/MIN/1.73M2 (ref 60–?)
ERYTHROCYTE [DISTWIDTH] IN BLOOD BY AUTOMATED COUNT: 16.7 %
GLOBULIN PLAS-MCNC: 4.8 G/DL (ref 2.8–4.4)
GLUCOSE BLD-MCNC: 140 MG/DL (ref 70–99)
GLUCOSE BLD-MCNC: 158 MG/DL (ref 70–99)
GLUCOSE BLD-MCNC: 187 MG/DL (ref 70–99)
GLUCOSE BLD-MCNC: 197 MG/DL (ref 70–99)
GLUCOSE BLD-MCNC: 211 MG/DL (ref 70–99)
HCT VFR BLD AUTO: 27.2 %
HGB BLD-MCNC: 8.5 G/DL
MCH RBC QN AUTO: 31.3 PG (ref 26–34)
MCHC RBC AUTO-ENTMCNC: 31.3 G/DL (ref 31–37)
MCV RBC AUTO: 100 FL
OSMOLALITY SERPL CALC.SUM OF ELEC: 299 MOSM/KG (ref 275–295)
PHOSPHATE SERPL-MCNC: 2.2 MG/DL (ref 2.5–4.9)
PLATELET # BLD AUTO: 69 10(3)UL (ref 150–450)
POTASSIUM SERPL-SCNC: 3.8 MMOL/L (ref 3.5–5.1)
PROT SERPL-MCNC: 7.7 G/DL (ref 6.4–8.2)
RBC # BLD AUTO: 2.72 X10(6)UL
SODIUM SERPL-SCNC: 139 MMOL/L (ref 136–145)
WBC # BLD AUTO: 5.2 X10(3) UL (ref 4–11)

## 2023-08-27 PROCEDURE — 99233 SBSQ HOSP IP/OBS HIGH 50: CPT | Performed by: INTERNAL MEDICINE

## 2023-08-27 PROCEDURE — 70450 CT HEAD/BRAIN W/O DYE: CPT | Performed by: NURSE PRACTITIONER

## 2023-08-27 RX ORDER — GABAPENTIN 300 MG/1
600 CAPSULE ORAL 2 TIMES DAILY
Status: DISCONTINUED | OUTPATIENT
Start: 2023-08-27 | End: 2023-08-31

## 2023-08-27 RX ORDER — CLONIDINE HYDROCHLORIDE 0.1 MG/1
0.1 TABLET ORAL 3 TIMES DAILY
Status: DISCONTINUED | OUTPATIENT
Start: 2023-08-27 | End: 2023-08-31

## 2023-08-27 RX ORDER — ECHINACEA PURPUREA EXTRACT 125 MG
1 TABLET ORAL
Status: DISCONTINUED | OUTPATIENT
Start: 2023-08-27 | End: 2023-08-31

## 2023-08-27 RX ORDER — CLONIDINE HYDROCHLORIDE 0.1 MG/1
0.1 TABLET ORAL 2 TIMES DAILY
Status: DISCONTINUED | OUTPATIENT
Start: 2023-08-27 | End: 2023-08-27

## 2023-08-27 RX ORDER — LOPERAMIDE HYDROCHLORIDE 2 MG/1
2 CAPSULE ORAL 4 TIMES DAILY PRN
Status: DISCONTINUED | OUTPATIENT
Start: 2023-08-27 | End: 2023-08-31

## 2023-08-28 LAB
ALBUMIN SERPL-MCNC: 3 G/DL (ref 3.4–5)
ALBUMIN/GLOB SERPL: 0.6 {RATIO} (ref 1–2)
ALP LIVER SERPL-CCNC: 54 U/L
ALT SERPL-CCNC: 18 U/L
AMMONIA PLAS-MCNC: 57 UMOL/L (ref 11–32)
ANION GAP SERPL CALC-SCNC: 5 MMOL/L (ref 0–18)
AST SERPL-CCNC: 22 U/L (ref 15–37)
ATRIAL RATE: 93 BPM
BILIRUB SERPL-MCNC: 0.8 MG/DL (ref 0.1–2)
BUN BLD-MCNC: 36 MG/DL (ref 7–18)
CALCIUM BLD-MCNC: 9.2 MG/DL (ref 8.5–10.1)
CHLORIDE SERPL-SCNC: 104 MMOL/L (ref 98–112)
CO2 SERPL-SCNC: 25 MMOL/L (ref 21–32)
CREAT BLD-MCNC: 1.81 MG/DL
EGFRCR SERPLBLD CKD-EPI 2021: 33 ML/MIN/1.73M2 (ref 60–?)
ERYTHROCYTE [DISTWIDTH] IN BLOOD BY AUTOMATED COUNT: 16.3 %
GLOBULIN PLAS-MCNC: 5.3 G/DL (ref 2.8–4.4)
GLUCOSE BLD-MCNC: 172 MG/DL (ref 70–99)
GLUCOSE BLD-MCNC: 189 MG/DL (ref 70–99)
GLUCOSE BLD-MCNC: 195 MG/DL (ref 70–99)
GLUCOSE BLD-MCNC: 197 MG/DL (ref 70–99)
GLUCOSE BLD-MCNC: 199 MG/DL (ref 70–99)
HCT VFR BLD AUTO: 29 %
HGB BLD-MCNC: 9.3 G/DL
IRON SATN MFR SERPL: 22 %
IRON SERPL-MCNC: 50 UG/DL
MCH RBC QN AUTO: 32.1 PG (ref 26–34)
MCHC RBC AUTO-ENTMCNC: 32.1 G/DL (ref 31–37)
MCV RBC AUTO: 100 FL
OSMOLALITY SERPL CALC.SUM OF ELEC: 292 MOSM/KG (ref 275–295)
P AXIS: -21 DEGREES
P-R INTERVAL: 134 MS
PHOSPHATE SERPL-MCNC: 1.2 MG/DL (ref 2.5–4.9)
PLATELET # BLD AUTO: 92 10(3)UL (ref 150–450)
POTASSIUM SERPL-SCNC: 3.9 MMOL/L (ref 3.5–5.1)
PROT SERPL-MCNC: 8.3 G/DL (ref 6.4–8.2)
Q-T INTERVAL: 352 MS
QRS DURATION: 86 MS
QTC CALCULATION (BEZET): 437 MS
R AXIS: 61 DEGREES
RBC # BLD AUTO: 2.9 X10(6)UL
SODIUM SERPL-SCNC: 134 MMOL/L (ref 136–145)
T AXIS: 3 DEGREES
TIBC SERPL-MCNC: 225 UG/DL (ref 240–450)
TRANSFERRIN SERPL-MCNC: 151 MG/DL (ref 200–360)
VENTRICULAR RATE: 93 BPM
VIT B12 SERPL-MCNC: >2000 PG/ML (ref 193–986)
WBC # BLD AUTO: 4.7 X10(3) UL (ref 4–11)

## 2023-08-28 RX ORDER — CARVEDILOL 6.25 MG/1
6.25 TABLET ORAL 2 TIMES DAILY WITH MEALS
Status: DISCONTINUED | OUTPATIENT
Start: 2023-08-28 | End: 2023-08-30

## 2023-08-28 RX ORDER — PENTOSAN POLYSULFATE SODIUM 100 MG/1
CAPSULE, GELATIN COATED ORAL
Qty: 60 CAPSULE | Refills: 0 | OUTPATIENT
Start: 2023-08-28

## 2023-08-28 RX ORDER — ACETAMINOPHEN 500 MG
1000 TABLET ORAL EVERY 6 HOURS PRN
Status: DISCONTINUED | OUTPATIENT
Start: 2023-08-28 | End: 2023-08-31

## 2023-08-29 ENCOUNTER — APPOINTMENT (OUTPATIENT)
Dept: CV DIAGNOSTICS | Facility: HOSPITAL | Age: 53
End: 2023-08-29
Attending: INTERNAL MEDICINE
Payer: COMMERCIAL

## 2023-08-29 PROBLEM — Z85.3 HISTORY OF BREAST CANCER: Status: ACTIVE | Noted: 2023-08-29

## 2023-08-29 PROBLEM — R59.9 ADENOPATHY: Status: ACTIVE | Noted: 2023-08-29

## 2023-08-29 PROBLEM — D64.9 NORMOCYTIC ANEMIA: Status: ACTIVE | Noted: 2023-08-29

## 2023-08-29 PROBLEM — D69.6 THROMBOCYTOPENIA: Status: ACTIVE | Noted: 2023-08-29

## 2023-08-29 PROBLEM — D69.6 THROMBOCYTOPENIA (HCC): Status: ACTIVE | Noted: 2023-08-29

## 2023-08-29 LAB
ALBUMIN SERPL-MCNC: 2.6 G/DL (ref 3.4–5)
ANION GAP SERPL CALC-SCNC: 3 MMOL/L (ref 0–18)
BUN BLD-MCNC: 43 MG/DL (ref 7–18)
CALCIUM BLD-MCNC: 8.8 MG/DL (ref 8.5–10.1)
CHLORIDE SERPL-SCNC: 107 MMOL/L (ref 98–112)
CO2 SERPL-SCNC: 29 MMOL/L (ref 21–32)
CREAT BLD-MCNC: 1.74 MG/DL
EGFRCR SERPLBLD CKD-EPI 2021: 34 ML/MIN/1.73M2 (ref 60–?)
FOLATE SERPL-MCNC: 36.7 NG/ML (ref 8.7–?)
GLUCOSE BLD-MCNC: 133 MG/DL (ref 70–99)
GLUCOSE BLD-MCNC: 144 MG/DL (ref 70–99)
GLUCOSE BLD-MCNC: 145 MG/DL (ref 70–99)
GLUCOSE BLD-MCNC: 158 MG/DL (ref 70–99)
GLUCOSE BLD-MCNC: 198 MG/DL (ref 70–99)
GLUCOSE BLD-MCNC: 217 MG/DL (ref 70–99)
HGB RETIC QN AUTO: 23.1 PG (ref 28.2–36.6)
IMM RETICS NFR: 0.15 RATIO (ref 0.1–0.3)
LDH SERPL L TO P-CCNC: 258 U/L
OSMOLALITY SERPL CALC.SUM OF ELEC: 301 MOSM/KG (ref 275–295)
PHOSPHATE SERPL-MCNC: 2.8 MG/DL (ref 2.5–4.9)
PHOSPHATE SERPL-MCNC: 2.8 MG/DL (ref 2.5–4.9)
PLATELET # BLD AUTO: 94.6 10(3)UL (ref 150–450)
POTASSIUM SERPL-SCNC: 3.6 MMOL/L (ref 3.5–5.1)
RETICS # AUTO: 42.6 X10(3) UL (ref 22.5–147.5)
RETICS/RBC NFR AUTO: 1.6 %
SODIUM SERPL-SCNC: 139 MMOL/L (ref 136–145)

## 2023-08-29 PROCEDURE — 99232 SBSQ HOSP IP/OBS MODERATE 35: CPT | Performed by: INTERNAL MEDICINE

## 2023-08-29 PROCEDURE — 93308 TTE F-UP OR LMTD: CPT | Performed by: INTERNAL MEDICINE

## 2023-08-29 RX ORDER — CLONIDINE HYDROCHLORIDE 0.1 MG/1
0.1 TABLET ORAL ONCE
Status: COMPLETED | OUTPATIENT
Start: 2023-08-29 | End: 2023-08-29

## 2023-08-29 RX ORDER — HYDRALAZINE HYDROCHLORIDE 20 MG/ML
10 INJECTION INTRAMUSCULAR; INTRAVENOUS EVERY 6 HOURS PRN
Status: DISCONTINUED | OUTPATIENT
Start: 2023-08-29 | End: 2023-08-31

## 2023-08-29 RX ORDER — LACTULOSE 10 G/15ML
20 SOLUTION ORAL 3 TIMES DAILY
Status: DISCONTINUED | OUTPATIENT
Start: 2023-08-29 | End: 2023-08-31

## 2023-08-30 LAB
ALBUMIN SERPL-MCNC: 2.8 G/DL (ref 3.4–5)
ANION GAP SERPL CALC-SCNC: 3 MMOL/L (ref 0–18)
BASOPHILS # BLD AUTO: 0.02 X10(3) UL (ref 0–0.2)
BASOPHILS NFR BLD AUTO: 0.4 %
BUN BLD-MCNC: 44 MG/DL (ref 7–18)
CALCIUM BLD-MCNC: 9 MG/DL (ref 8.5–10.1)
CHLORIDE SERPL-SCNC: 109 MMOL/L (ref 98–112)
CO2 SERPL-SCNC: 30 MMOL/L (ref 21–32)
CREAT BLD-MCNC: 1.77 MG/DL
DEPRECATED HBV CORE AB SER IA-ACNC: 464.3 NG/ML
EGFRCR SERPLBLD CKD-EPI 2021: 34 ML/MIN/1.73M2 (ref 60–?)
EOSINOPHIL # BLD AUTO: 0.2 X10(3) UL (ref 0–0.7)
EOSINOPHIL NFR BLD AUTO: 4.4 %
ERYTHROCYTE [DISTWIDTH] IN BLOOD BY AUTOMATED COUNT: 16.3 %
GLUCOSE BLD-MCNC: 176 MG/DL (ref 70–99)
GLUCOSE BLD-MCNC: 179 MG/DL (ref 70–99)
GLUCOSE BLD-MCNC: 180 MG/DL (ref 70–99)
GLUCOSE BLD-MCNC: 199 MG/DL (ref 70–99)
GLUCOSE BLD-MCNC: 223 MG/DL (ref 70–99)
HCT VFR BLD AUTO: 26.4 %
HGB BLD-MCNC: 8.1 G/DL
IMM GRANULOCYTES # BLD AUTO: 0.21 X10(3) UL (ref 0–1)
IMM GRANULOCYTES NFR BLD: 4.6 %
LYMPHOCYTES # BLD AUTO: 0.68 X10(3) UL (ref 1–4)
LYMPHOCYTES NFR BLD AUTO: 14.8 %
MCH RBC QN AUTO: 31.4 PG (ref 26–34)
MCHC RBC AUTO-ENTMCNC: 30.7 G/DL (ref 31–37)
MCV RBC AUTO: 102.3 FL
MONOCYTES # BLD AUTO: 0.7 X10(3) UL (ref 0.1–1)
MONOCYTES NFR BLD AUTO: 15.3 %
NEUTROPHILS # BLD AUTO: 2.78 X10 (3) UL (ref 1.5–7.7)
NEUTROPHILS # BLD AUTO: 2.78 X10(3) UL (ref 1.5–7.7)
NEUTROPHILS NFR BLD AUTO: 60.5 %
OSMOLALITY SERPL CALC.SUM OF ELEC: 309 MOSM/KG (ref 275–295)
PHOSPHATE SERPL-MCNC: 3.2 MG/DL (ref 2.5–4.9)
PLATELET # BLD AUTO: 140 10(3)UL (ref 150–450)
POTASSIUM SERPL-SCNC: 3.8 MMOL/L (ref 3.5–5.1)
RBC # BLD AUTO: 2.58 X10(6)UL
SODIUM SERPL-SCNC: 142 MMOL/L (ref 136–145)
WBC # BLD AUTO: 4.6 X10(3) UL (ref 4–11)

## 2023-08-30 PROCEDURE — 99232 SBSQ HOSP IP/OBS MODERATE 35: CPT | Performed by: INTERNAL MEDICINE

## 2023-08-30 RX ORDER — CARVEDILOL 12.5 MG/1
12.5 TABLET ORAL 2 TIMES DAILY WITH MEALS
Status: DISCONTINUED | OUTPATIENT
Start: 2023-08-30 | End: 2023-08-31

## 2023-08-30 RX ORDER — ERTAPENEM 1 G/1
1 INJECTION, POWDER, LYOPHILIZED, FOR SOLUTION INTRAMUSCULAR; INTRAVENOUS DAILY
Qty: 8 EACH | Refills: 0 | Status: SHIPPED | OUTPATIENT
Start: 2023-08-30 | End: 2023-09-07

## 2023-08-31 VITALS
OXYGEN SATURATION: 97 % | HEIGHT: 64 IN | TEMPERATURE: 98 F | RESPIRATION RATE: 17 BRPM | WEIGHT: 293 LBS | BODY MASS INDEX: 50.02 KG/M2 | DIASTOLIC BLOOD PRESSURE: 74 MMHG | SYSTOLIC BLOOD PRESSURE: 154 MMHG | HEART RATE: 78 BPM

## 2023-08-31 LAB
ALBUMIN SERPL-MCNC: 2.6 G/DL (ref 3.4–5)
ANION GAP SERPL CALC-SCNC: 3 MMOL/L (ref 0–18)
BUN BLD-MCNC: 44 MG/DL (ref 7–18)
CALCIUM BLD-MCNC: 9.5 MG/DL (ref 8.5–10.1)
CHLORIDE SERPL-SCNC: 109 MMOL/L (ref 98–112)
CO2 SERPL-SCNC: 30 MMOL/L (ref 21–32)
CREAT BLD-MCNC: 1.66 MG/DL
EGFRCR SERPLBLD CKD-EPI 2021: 36 ML/MIN/1.73M2 (ref 60–?)
GLUCOSE BLD-MCNC: 146 MG/DL (ref 70–99)
GLUCOSE BLD-MCNC: 150 MG/DL (ref 70–99)
GLUCOSE BLD-MCNC: 194 MG/DL (ref 70–99)
GLUCOSE BLD-MCNC: 200 MG/DL (ref 70–99)
OSMOLALITY SERPL CALC.SUM OF ELEC: 308 MOSM/KG (ref 275–295)
PHOSPHATE SERPL-MCNC: 3.4 MG/DL (ref 2.5–4.9)
POTASSIUM SERPL-SCNC: 3.8 MMOL/L (ref 3.5–5.1)
SODIUM SERPL-SCNC: 142 MMOL/L (ref 136–145)

## 2023-08-31 PROCEDURE — 99232 SBSQ HOSP IP/OBS MODERATE 35: CPT | Performed by: INTERNAL MEDICINE

## 2023-08-31 RX ORDER — FUROSEMIDE 40 MG/1
20 TABLET ORAL DAILY
Qty: 30 TABLET | Refills: 0 | Status: SHIPPED | OUTPATIENT
Start: 2023-08-31

## 2023-08-31 RX ORDER — CARVEDILOL 12.5 MG/1
12.5 TABLET ORAL 2 TIMES DAILY WITH MEALS
Qty: 60 TABLET | Refills: 0 | Status: SHIPPED | OUTPATIENT
Start: 2023-09-01

## 2023-08-31 RX ORDER — CLONIDINE HYDROCHLORIDE 0.1 MG/1
0.1 TABLET ORAL 3 TIMES DAILY
Qty: 90 TABLET | Refills: 0 | Status: SHIPPED | OUTPATIENT
Start: 2023-08-31

## 2023-08-31 RX ORDER — FUROSEMIDE 40 MG/1
40 TABLET ORAL DAILY
Status: DISCONTINUED | OUTPATIENT
Start: 2023-08-31 | End: 2023-08-31

## 2023-09-01 LAB — SOL TRANSFERRIN RECEPTOR: 26.9 NMOL/L

## 2023-09-05 ENCOUNTER — PATIENT OUTREACH (OUTPATIENT)
Dept: CASE MANAGEMENT | Age: 53
End: 2023-09-05

## 2023-09-05 RX ORDER — FOLIC ACID 1 MG/1
1 TABLET ORAL DAILY
Qty: 30 TABLET | Refills: 11 | Status: SHIPPED | OUTPATIENT
Start: 2023-09-05

## 2023-09-05 RX ORDER — TAMOXIFEN CITRATE 20 MG/1
20 TABLET ORAL DAILY
Qty: 30 TABLET | Refills: 5 | Status: SHIPPED | OUTPATIENT
Start: 2023-09-05

## 2023-09-05 NOTE — PROGRESS NOTES
Patient left a message looking for scheduling assistance. (Dc 8/31)    Maryam Delgado MD  Specialty: GASTROENTEROLOGY  For follow up of liver  [address]  [address] [facility] [phone]  RONEN Melendez  Specialty: Nurse Practitioner  kidney check  [address]  [address] [address] [phone]  1 week    [address], APRN  Specialty: Nurse Practitioner  Call the number above to schedule a follow up appointment with ID  within one week. [address]  [address]  1 week    Yan Buenrostro MD  Specialty: Internal Medicine  Post hospitalization follow up  [address]  [address] [phone]  1 week

## 2023-09-06 NOTE — PROGRESS NOTES
1st attempt DM apt request (Dc 8/31)      Adriana  kidney check  [facility]  [address], [facility] [address]  [phone]  1 week  Apt made:   Thu 09/28 @11:00am  Need apt 2 weeks later    Jody Gallo MD  Specialty: Internal Medicine  Post hospitalization follow up  [address] [place], [place] [address]  [address] [place] [phone]  1 week  Pt advised she has a msg into the office for apt and office should be calling back on Thursday 09/07

## 2023-09-07 ENCOUNTER — LAB REQUISITION (OUTPATIENT)
Dept: LAB | Facility: HOSPITAL | Age: 53
End: 2023-09-07
Payer: COMMERCIAL

## 2023-09-07 DIAGNOSIS — A41.51 SEPSIS DUE TO ESCHERICHIA COLI (E. COLI) (HCC): ICD-10-CM

## 2023-09-07 DIAGNOSIS — Z16.12 EXTENDED SPECTRUM BETA LACTAMASE (ESBL) RESISTANCE: ICD-10-CM

## 2023-09-07 DIAGNOSIS — N39.0 URINARY TRACT INFECTION, SITE NOT SPECIFIED: ICD-10-CM

## 2023-09-07 LAB
ALBUMIN SERPL-MCNC: 2.9 G/DL (ref 3.4–5)
ALBUMIN/GLOB SERPL: 0.6 {RATIO} (ref 1–2)
ALP LIVER SERPL-CCNC: 60 U/L
ALT SERPL-CCNC: 20 U/L
ANION GAP SERPL CALC-SCNC: 5 MMOL/L (ref 0–18)
AST SERPL-CCNC: 21 U/L (ref 15–37)
BASOPHILS # BLD AUTO: 0.02 X10(3) UL (ref 0–0.2)
BASOPHILS NFR BLD AUTO: 0.6 %
BILIRUB SERPL-MCNC: 0.5 MG/DL (ref 0.1–2)
BUN BLD-MCNC: 26 MG/DL (ref 7–18)
CALCIUM BLD-MCNC: 10.3 MG/DL (ref 8.5–10.1)
CHLORIDE SERPL-SCNC: 101 MMOL/L (ref 98–112)
CO2 SERPL-SCNC: 32 MMOL/L (ref 21–32)
CREAT BLD-MCNC: 2.33 MG/DL
CRP SERPL-MCNC: 0.7 MG/DL (ref ?–0.3)
EGFRCR SERPLBLD CKD-EPI 2021: 24 ML/MIN/1.73M2 (ref 60–?)
EOSINOPHIL # BLD AUTO: 0.16 X10(3) UL (ref 0–0.7)
EOSINOPHIL NFR BLD AUTO: 4.7 %
ERYTHROCYTE [DISTWIDTH] IN BLOOD BY AUTOMATED COUNT: 15.4 %
GLOBULIN PLAS-MCNC: 5.1 G/DL (ref 2.8–4.4)
GLUCOSE BLD-MCNC: 81 MG/DL (ref 70–99)
HCT VFR BLD AUTO: 30.4 %
HGB BLD-MCNC: 8.8 G/DL
IMM GRANULOCYTES # BLD AUTO: 0.02 X10(3) UL (ref 0–1)
IMM GRANULOCYTES NFR BLD: 0.6 %
LYMPHOCYTES # BLD AUTO: 0.55 X10(3) UL (ref 1–4)
LYMPHOCYTES NFR BLD AUTO: 16.1 %
MCH RBC QN AUTO: 30.7 PG (ref 26–34)
MCHC RBC AUTO-ENTMCNC: 28.9 G/DL (ref 31–37)
MCV RBC AUTO: 105.9 FL
MONOCYTES # BLD AUTO: 0.44 X10(3) UL (ref 0.1–1)
MONOCYTES NFR BLD AUTO: 12.9 %
NEUTROPHILS # BLD AUTO: 2.22 X10 (3) UL (ref 1.5–7.7)
NEUTROPHILS # BLD AUTO: 2.22 X10(3) UL (ref 1.5–7.7)
NEUTROPHILS NFR BLD AUTO: 65.1 %
OSMOLALITY SERPL CALC.SUM OF ELEC: 290 MOSM/KG (ref 275–295)
PLATELET # BLD AUTO: 129 10(3)UL (ref 150–450)
PLATELET MORPHOLOGY: NORMAL
POTASSIUM SERPL-SCNC: 3.8 MMOL/L (ref 3.5–5.1)
PROT SERPL-MCNC: 8 G/DL (ref 6.4–8.2)
RBC # BLD AUTO: 2.87 X10(6)UL
SODIUM SERPL-SCNC: 138 MMOL/L (ref 136–145)
WBC # BLD AUTO: 3.4 X10(3) UL (ref 4–11)

## 2023-09-07 PROCEDURE — 85025 COMPLETE CBC W/AUTO DIFF WBC: CPT | Performed by: INTERNAL MEDICINE

## 2023-09-07 PROCEDURE — 86140 C-REACTIVE PROTEIN: CPT | Performed by: INTERNAL MEDICINE

## 2023-09-07 PROCEDURE — 80053 COMPREHEN METABOLIC PANEL: CPT | Performed by: INTERNAL MEDICINE

## 2023-09-07 NOTE — PROGRESS NOTES
Vangie Simpson NP  Nephrology  kidney check  [address] [address] [address]  [address], Count includes the [facility] [address]  [phone]  1 week  Apt made:  Fri 10/13 @87:82IB       Augustine Buenrostro MD  Specialty: Internal Medicine  Post hospitalization follow up  [address] [place], [place] [address]lance  [address] [place] [phone]  1 week  Pt advised she has a msg into the office for apt and office should be calling back on Thursday 09/07  Confirmed w/pt  Closing encounter

## 2023-09-20 ENCOUNTER — OFFICE VISIT (OUTPATIENT)
Dept: HEMATOLOGY/ONCOLOGY | Facility: HOSPITAL | Age: 53
End: 2023-09-20
Attending: INTERNAL MEDICINE
Payer: COMMERCIAL

## 2023-09-20 VITALS
SYSTOLIC BLOOD PRESSURE: 136 MMHG | WEIGHT: 293 LBS | RESPIRATION RATE: 18 BRPM | HEIGHT: 65 IN | HEART RATE: 87 BPM | OXYGEN SATURATION: 97 % | TEMPERATURE: 98 F | DIASTOLIC BLOOD PRESSURE: 80 MMHG | BODY MASS INDEX: 48.82 KG/M2

## 2023-09-20 DIAGNOSIS — N39.0 SEPSIS SECONDARY TO UTI: ICD-10-CM

## 2023-09-20 DIAGNOSIS — C50.811 MALIGNANT NEOPLASM OF OVERLAPPING SITES OF RIGHT BREAST IN FEMALE, ESTROGEN RECEPTOR POSITIVE: Primary | ICD-10-CM

## 2023-09-20 DIAGNOSIS — R77.9 ELEVATED BLOOD PROTEIN: ICD-10-CM

## 2023-09-20 DIAGNOSIS — D69.6 THROMBOCYTOPENIA (HCC): ICD-10-CM

## 2023-09-20 DIAGNOSIS — A41.9 SEPSIS SECONDARY TO UTI: ICD-10-CM

## 2023-09-20 DIAGNOSIS — R09.02 HYPOXIA: ICD-10-CM

## 2023-09-20 DIAGNOSIS — N18.32 STAGE 3B CHRONIC KIDNEY DISEASE (HCC): ICD-10-CM

## 2023-09-20 DIAGNOSIS — Z17.0 MALIGNANT NEOPLASM OF OVERLAPPING SITES OF RIGHT BREAST IN FEMALE, ESTROGEN RECEPTOR POSITIVE: Primary | ICD-10-CM

## 2023-09-20 DIAGNOSIS — N18.9 ANEMIA OF RENAL DISEASE: ICD-10-CM

## 2023-09-20 DIAGNOSIS — D63.1 ANEMIA OF RENAL DISEASE: ICD-10-CM

## 2023-09-20 DIAGNOSIS — E83.52 HYPERCALCEMIA: ICD-10-CM

## 2023-09-20 DIAGNOSIS — K74.69 OTHER CIRRHOSIS OF LIVER (HCC): ICD-10-CM

## 2023-09-20 LAB
ALBUMIN SERPL-MCNC: 3.1 G/DL (ref 3.4–5)
ALBUMIN/GLOB SERPL: 0.5 {RATIO} (ref 1–2)
ALP LIVER SERPL-CCNC: 53 U/L
ALT SERPL-CCNC: 17 U/L
ANION GAP SERPL CALC-SCNC: 9 MMOL/L (ref 0–18)
AST SERPL-CCNC: 11 U/L (ref 15–37)
BASOPHILS # BLD AUTO: 0.01 X10(3) UL (ref 0–0.2)
BASOPHILS NFR BLD AUTO: 0.3 %
BILIRUB SERPL-MCNC: 0.5 MG/DL (ref 0.1–2)
BRCA1 C.185DELAG BLD/T QL: 26.6 U/ML (ref ?–38)
BUN BLD-MCNC: 31 MG/DL (ref 7–18)
CALCIUM BLD-MCNC: 11.7 MG/DL (ref 8.5–10.1)
CHLORIDE SERPL-SCNC: 101 MMOL/L (ref 98–112)
CO2 SERPL-SCNC: 28 MMOL/L (ref 21–32)
CREAT BLD-MCNC: 2.69 MG/DL
EGFRCR SERPLBLD CKD-EPI 2021: 20 ML/MIN/1.73M2 (ref 60–?)
EOSINOPHIL # BLD AUTO: 0.59 X10(3) UL (ref 0–0.7)
EOSINOPHIL NFR BLD AUTO: 16.5 %
ERYTHROCYTE [DISTWIDTH] IN BLOOD BY AUTOMATED COUNT: 14.6 %
GLOBULIN PLAS-MCNC: 5.7 G/DL (ref 2.8–4.4)
GLUCOSE BLD-MCNC: 197 MG/DL (ref 70–99)
HCT VFR BLD AUTO: 30.6 %
HGB BLD-MCNC: 9.7 G/DL
IMM GRANULOCYTES # BLD AUTO: 0.02 X10(3) UL (ref 0–1)
IMM GRANULOCYTES NFR BLD: 0.6 %
LYMPHOCYTES # BLD AUTO: 0.53 X10(3) UL (ref 1–4)
LYMPHOCYTES NFR BLD AUTO: 14.8 %
MCH RBC QN AUTO: 31.1 PG (ref 26–34)
MCHC RBC AUTO-ENTMCNC: 31.7 G/DL (ref 31–37)
MCV RBC AUTO: 98.1 FL
MONOCYTES # BLD AUTO: 0.46 X10(3) UL (ref 0.1–1)
MONOCYTES NFR BLD AUTO: 12.8 %
NEUTROPHILS # BLD AUTO: 1.97 X10 (3) UL (ref 1.5–7.7)
NEUTROPHILS # BLD AUTO: 1.97 X10(3) UL (ref 1.5–7.7)
NEUTROPHILS NFR BLD AUTO: 55 %
OSMOLALITY SERPL CALC.SUM OF ELEC: 298 MOSM/KG (ref 275–295)
PLATELET # BLD AUTO: 100 10(3)UL (ref 150–450)
POTASSIUM SERPL-SCNC: 4.8 MMOL/L (ref 3.5–5.1)
PROT SERPL-MCNC: 8.8 G/DL (ref 6.4–8.2)
PTH-INTACT SERPL-MCNC: <6.3 PG/ML (ref 18.5–88)
RBC # BLD AUTO: 3.12 X10(6)UL
SODIUM SERPL-SCNC: 138 MMOL/L (ref 136–145)
VIT D+METAB SERPL-MCNC: 38.2 NG/ML (ref 30–100)
WBC # BLD AUTO: 3.6 X10(3) UL (ref 4–11)

## 2023-09-20 PROCEDURE — 99215 OFFICE O/P EST HI 40 MIN: CPT | Performed by: INTERNAL MEDICINE

## 2023-09-21 ENCOUNTER — TELEPHONE (OUTPATIENT)
Dept: HEMATOLOGY/ONCOLOGY | Facility: HOSPITAL | Age: 53
End: 2023-09-21

## 2023-09-21 DIAGNOSIS — Z17.0 MALIGNANT NEOPLASM OF OVERLAPPING SITES OF RIGHT BREAST IN FEMALE, ESTROGEN RECEPTOR POSITIVE: ICD-10-CM

## 2023-09-21 DIAGNOSIS — E83.52 HYPERCALCEMIA: Primary | ICD-10-CM

## 2023-09-21 DIAGNOSIS — C50.811 MALIGNANT NEOPLASM OF OVERLAPPING SITES OF RIGHT BREAST IN FEMALE, ESTROGEN RECEPTOR POSITIVE: ICD-10-CM

## 2023-09-21 LAB — ESTRADIOL SERPL-MCNC: 18.1 PG/ML

## 2023-09-21 NOTE — TELEPHONE ENCOUNTER
[facility] with labs results- elevated calcium, creatinine, total protein. Continued pancytopenia though better. W/u ordered including additional labs and PET. She verbalized understanding.

## 2023-09-27 ENCOUNTER — HOSPITAL ENCOUNTER (INPATIENT)
Facility: HOSPITAL | Age: 53
LOS: 2 days | Discharge: HOME HEALTH | End: 2023-09-30
Attending: EMERGENCY MEDICINE | Admitting: HOSPITALIST
Payer: COMMERCIAL

## 2023-09-27 ENCOUNTER — NURSE ONLY (OUTPATIENT)
Dept: HEMATOLOGY/ONCOLOGY | Facility: HOSPITAL | Age: 53
End: 2023-09-27
Attending: INTERNAL MEDICINE
Payer: COMMERCIAL

## 2023-09-27 DIAGNOSIS — N18.9 ACUTE RENAL FAILURE SUPERIMPOSED ON CHRONIC KIDNEY DISEASE, UNSPECIFIED CKD STAGE, UNSPECIFIED ACUTE RENAL FAILURE TYPE: Primary | ICD-10-CM

## 2023-09-27 DIAGNOSIS — E83.52 HYPERCALCEMIA: ICD-10-CM

## 2023-09-27 DIAGNOSIS — E11.9 CONTROLLED TYPE 2 DIABETES MELLITUS WITHOUT COMPLICATION, WITH LONG-TERM CURRENT USE OF INSULIN (HCC): ICD-10-CM

## 2023-09-27 DIAGNOSIS — N17.9 ACUTE RENAL FAILURE SUPERIMPOSED ON CHRONIC KIDNEY DISEASE, UNSPECIFIED CKD STAGE, UNSPECIFIED ACUTE RENAL FAILURE TYPE: Primary | ICD-10-CM

## 2023-09-27 DIAGNOSIS — Z79.4 CONTROLLED TYPE 2 DIABETES MELLITUS WITHOUT COMPLICATION, WITH LONG-TERM CURRENT USE OF INSULIN (HCC): ICD-10-CM

## 2023-09-27 LAB
ALBUMIN SERPL-MCNC: 3.3 G/DL (ref 3.4–5)
ALBUMIN/GLOB SERPL: 0.6 {RATIO} (ref 1–2)
ALP LIVER SERPL-CCNC: 56 U/L
ALT SERPL-CCNC: 19 U/L
ANION GAP SERPL CALC-SCNC: 9 MMOL/L (ref 0–18)
AST SERPL-CCNC: 33 U/L (ref 15–37)
BASOPHILS # BLD AUTO: 0.03 X10(3) UL (ref 0–0.2)
BASOPHILS NFR BLD AUTO: 0.6 %
BILIRUB SERPL-MCNC: 0.6 MG/DL (ref 0.1–2)
BILIRUB UR QL STRIP.AUTO: NEGATIVE
BUN BLD-MCNC: 27 MG/DL (ref 7–18)
CALCIUM BLD-MCNC: 13.1 MG/DL (ref 8.5–10.1)
CHLORIDE SERPL-SCNC: 98 MMOL/L (ref 98–112)
CLARITY UR REFRACT.AUTO: CLEAR
CO2 SERPL-SCNC: 25 MMOL/L (ref 21–32)
CREAT BLD-MCNC: 2.68 MG/DL
EGFRCR SERPLBLD CKD-EPI 2021: 21 ML/MIN/1.73M2 (ref 60–?)
EOSINOPHIL # BLD AUTO: 0.76 X10(3) UL (ref 0–0.7)
EOSINOPHIL NFR BLD AUTO: 14.5 %
ERYTHROCYTE [DISTWIDTH] IN BLOOD BY AUTOMATED COUNT: 14.9 %
GLOBULIN PLAS-MCNC: 5.9 G/DL (ref 2.8–4.4)
GLUCOSE BLD-MCNC: 231 MG/DL (ref 70–99)
GLUCOSE UR STRIP.AUTO-MCNC: 30 MG/DL
HCT VFR BLD AUTO: 31.7 %
HGB BLD-MCNC: 10.2 G/DL
IGA SERPL-MCNC: 732 MG/DL (ref 70–312)
IGM SERPL-MCNC: 151 MG/DL (ref 43–279)
IMM GRANULOCYTES # BLD AUTO: 0.03 X10(3) UL (ref 0–1)
IMM GRANULOCYTES NFR BLD: 0.6 %
IMMUNOGLOBULIN PNL SER-MCNC: 2120 MG/DL (ref 791–1643)
KETONES UR STRIP.AUTO-MCNC: NEGATIVE MG/DL
LDH SERPL L TO P-CCNC: 213 U/L
LEUKOCYTE ESTERASE UR QL STRIP.AUTO: NEGATIVE
LYMPHOCYTES # BLD AUTO: 0.9 X10(3) UL (ref 1–4)
LYMPHOCYTES NFR BLD AUTO: 17.2 %
MCH RBC QN AUTO: 32 PG (ref 26–34)
MCHC RBC AUTO-ENTMCNC: 32.2 G/DL (ref 31–37)
MCV RBC AUTO: 99.4 FL
MONOCYTES # BLD AUTO: 0.73 X10(3) UL (ref 0.1–1)
MONOCYTES NFR BLD AUTO: 14 %
NEUTROPHILS # BLD AUTO: 2.78 X10 (3) UL (ref 1.5–7.7)
NEUTROPHILS # BLD AUTO: 2.78 X10(3) UL (ref 1.5–7.7)
NEUTROPHILS NFR BLD AUTO: 53.1 %
NITRITE UR QL STRIP.AUTO: NEGATIVE
OSMOLALITY SERPL CALC.SUM OF ELEC: 286 MOSM/KG (ref 275–295)
PH UR STRIP.AUTO: 5.5 [PH] (ref 5–8)
PLATELET # BLD AUTO: 103 10(3)UL (ref 150–450)
POTASSIUM SERPL-SCNC: 5 MMOL/L (ref 3.5–5.1)
PROT SERPL-MCNC: 9.2 G/DL (ref 6.4–8.2)
RBC # BLD AUTO: 3.19 X10(6)UL
RBC UR QL AUTO: NEGATIVE
SODIUM SERPL-SCNC: 132 MMOL/L (ref 136–145)
SP GR UR STRIP.AUTO: 1.01 (ref 1–1.03)
UROBILINOGEN UR STRIP.AUTO-MCNC: NORMAL MG/DL
WBC # BLD AUTO: 5.2 X10(3) UL (ref 4–11)

## 2023-09-27 PROCEDURE — 84165 PROTEIN E-PHORESIS SERUM: CPT

## 2023-09-27 PROCEDURE — 82542 COL CHROMOTOGRAPHY QUAL/QUAN: CPT

## 2023-09-27 PROCEDURE — 82784 ASSAY IGA/IGD/IGG/IGM EACH: CPT

## 2023-09-27 PROCEDURE — 83615 LACTATE (LD) (LDH) ENZYME: CPT

## 2023-09-27 PROCEDURE — 83521 IG LIGHT CHAINS FREE EACH: CPT

## 2023-09-27 PROCEDURE — 36415 COLL VENOUS BLD VENIPUNCTURE: CPT

## 2023-09-27 PROCEDURE — 86334 IMMUNOFIX E-PHORESIS SERUM: CPT

## 2023-09-27 RX ORDER — SODIUM CHLORIDE 9 MG/ML
INJECTION, SOLUTION INTRAVENOUS CONTINUOUS
Status: ACTIVE | OUTPATIENT
Start: 2023-09-27 | End: 2023-09-27

## 2023-09-27 RX ORDER — CLONIDINE HYDROCHLORIDE 0.1 MG/1
0.1 TABLET ORAL 3 TIMES DAILY
Status: DISCONTINUED | OUTPATIENT
Start: 2023-09-27 | End: 2023-09-30

## 2023-09-27 RX ORDER — FUROSEMIDE 20 MG/1
20 TABLET ORAL DAILY
Status: DISCONTINUED | OUTPATIENT
Start: 2023-09-28 | End: 2023-09-29

## 2023-09-27 RX ORDER — ENOXAPARIN SODIUM 100 MG/ML
40 INJECTION SUBCUTANEOUS NIGHTLY
Status: DISCONTINUED | OUTPATIENT
Start: 2023-09-27 | End: 2023-09-30

## 2023-09-27 RX ORDER — TAMOXIFEN CITRATE 10 MG/1
20 TABLET ORAL DAILY
Status: DISCONTINUED | OUTPATIENT
Start: 2023-09-28 | End: 2023-09-30

## 2023-09-27 RX ORDER — ROSUVASTATIN CALCIUM 5 MG/1
5 TABLET, COATED ORAL
Status: DISCONTINUED | OUTPATIENT
Start: 2023-09-28 | End: 2023-09-30

## 2023-09-27 RX ORDER — FOLIC ACID 1 MG/1
1 TABLET ORAL DAILY
Status: DISCONTINUED | OUTPATIENT
Start: 2023-09-28 | End: 2023-09-30

## 2023-09-27 RX ORDER — CARVEDILOL 12.5 MG/1
12.5 TABLET ORAL 2 TIMES DAILY WITH MEALS
Status: DISCONTINUED | OUTPATIENT
Start: 2023-09-28 | End: 2023-09-30

## 2023-09-27 RX ORDER — SODIUM CHLORIDE 9 MG/ML
INJECTION, SOLUTION INTRAVENOUS CONTINUOUS
Status: DISCONTINUED | OUTPATIENT
Start: 2023-09-27 | End: 2023-09-30

## 2023-09-27 RX ORDER — ALLOPURINOL 300 MG/1
150 TABLET ORAL EVERY OTHER DAY
Status: DISCONTINUED | OUTPATIENT
Start: 2023-09-28 | End: 2023-09-30

## 2023-09-27 RX ORDER — ROSUVASTATIN CALCIUM 5 MG/1
5 TABLET, COATED ORAL AS DIRECTED
COMMUNITY

## 2023-09-27 RX ORDER — CETIRIZINE HYDROCHLORIDE 5 MG/1
5 TABLET ORAL DAILY
Status: DISCONTINUED | OUTPATIENT
Start: 2023-09-28 | End: 2023-09-30

## 2023-09-27 RX ORDER — GABAPENTIN 300 MG/1
600 CAPSULE ORAL 2 TIMES DAILY
Status: DISCONTINUED | OUTPATIENT
Start: 2023-09-27 | End: 2023-09-30

## 2023-09-27 RX ORDER — ALPRAZOLAM 0.5 MG/1
0.5 TABLET ORAL NIGHTLY PRN
Status: DISCONTINUED | OUTPATIENT
Start: 2023-09-27 | End: 2023-09-29

## 2023-09-27 RX ORDER — SODIUM CHLORIDE 9 MG/ML
INJECTION, SOLUTION INTRAVENOUS CONTINUOUS
Status: DISCONTINUED | OUTPATIENT
Start: 2023-09-27 | End: 2023-09-27

## 2023-09-27 NOTE — ED INITIAL ASSESSMENT (HPI)
PATIENT ADVISED TO COME TO THE ER BY HER NEPHROLOGIST. PT HAD HER LABS DRAWN TODAY AND PATIENT WAS TOLD HER CALCIUM LEVEL WAS 13.2 RECENT ADMISSION TO HOSPITAL FOR SEPSIS.

## 2023-09-28 PROBLEM — D61.818 PANCYTOPENIA (HCC): Status: ACTIVE | Noted: 2023-09-28

## 2023-09-28 LAB
ANION GAP SERPL CALC-SCNC: 6 MMOL/L (ref 0–18)
ATRIAL RATE: 105 BPM
BASOPHILS # BLD AUTO: 0.02 X10(3) UL (ref 0–0.2)
BASOPHILS NFR BLD AUTO: 0.5 %
BUN BLD-MCNC: 29 MG/DL (ref 7–18)
CA-I BLD-SCNC: 1.59 MMOL/L (ref 0.95–1.32)
CALCIUM BLD-MCNC: 12.4 MG/DL (ref 8.5–10.1)
CHLORIDE SERPL-SCNC: 103 MMOL/L (ref 98–112)
CO2 SERPL-SCNC: 28 MMOL/L (ref 21–32)
CREAT BLD-MCNC: 2.61 MG/DL
EGFRCR SERPLBLD CKD-EPI 2021: 21 ML/MIN/1.73M2 (ref 60–?)
EOSINOPHIL # BLD AUTO: 0.51 X10(3) UL (ref 0–0.7)
EOSINOPHIL NFR BLD AUTO: 13.3 %
ERYTHROCYTE [DISTWIDTH] IN BLOOD BY AUTOMATED COUNT: 14.9 %
GLUCOSE BLD-MCNC: 142 MG/DL (ref 70–99)
GLUCOSE BLD-MCNC: 156 MG/DL (ref 70–99)
GLUCOSE BLD-MCNC: 161 MG/DL (ref 70–99)
GLUCOSE BLD-MCNC: 198 MG/DL (ref 70–99)
GLUCOSE BLD-MCNC: 199 MG/DL (ref 70–99)
GLUCOSE BLD-MCNC: 212 MG/DL (ref 70–99)
HCT VFR BLD AUTO: 27.5 %
HGB BLD-MCNC: 8.7 G/DL
IMM GRANULOCYTES # BLD AUTO: 0.02 X10(3) UL (ref 0–1)
IMM GRANULOCYTES NFR BLD: 0.5 %
LYMPHOCYTES # BLD AUTO: 0.76 X10(3) UL (ref 1–4)
LYMPHOCYTES NFR BLD AUTO: 19.8 %
MCH RBC QN AUTO: 32.5 PG (ref 26–34)
MCHC RBC AUTO-ENTMCNC: 31.6 G/DL (ref 31–37)
MCV RBC AUTO: 102.6 FL
MONOCYTES # BLD AUTO: 0.45 X10(3) UL (ref 0.1–1)
MONOCYTES NFR BLD AUTO: 11.7 %
NEUTROPHILS # BLD AUTO: 2.08 X10 (3) UL (ref 1.5–7.7)
NEUTROPHILS # BLD AUTO: 2.08 X10(3) UL (ref 1.5–7.7)
NEUTROPHILS NFR BLD AUTO: 54.2 %
OSMOLALITY SERPL CALC.SUM OF ELEC: 293 MOSM/KG (ref 275–295)
P AXIS: 40 DEGREES
P-R INTERVAL: 160 MS
PLATELET # BLD AUTO: 94 10(3)UL (ref 150–450)
POTASSIUM SERPL-SCNC: 4.3 MMOL/L (ref 3.5–5.1)
PROT UR-MCNC: 32.2 MG/DL
Q-T INTERVAL: 340 MS
QRS DURATION: 88 MS
QTC CALCULATION (BEZET): 449 MS
R AXIS: 41 DEGREES
RBC # BLD AUTO: 2.68 X10(6)UL
SODIUM SERPL-SCNC: 137 MMOL/L (ref 136–145)
T AXIS: -21 DEGREES
VENTRICULAR RATE: 105 BPM
WBC # BLD AUTO: 3.8 X10(3) UL (ref 4–11)

## 2023-09-28 PROCEDURE — 99254 IP/OBS CNSLTJ NEW/EST MOD 60: CPT | Performed by: INTERNAL MEDICINE

## 2023-09-28 RX ORDER — ONDANSETRON 4 MG/1
4 TABLET, FILM COATED ORAL 4 TIMES DAILY PRN
Status: DISCONTINUED | OUTPATIENT
Start: 2023-09-28 | End: 2023-09-30

## 2023-09-28 RX ORDER — ACETAMINOPHEN 325 MG/1
650 TABLET ORAL EVERY 6 HOURS PRN
Status: DISCONTINUED | OUTPATIENT
Start: 2023-09-28 | End: 2023-09-30

## 2023-09-28 RX ORDER — CALCITONIN SALMON 200 [USP'U]/ML
4 INJECTION, SOLUTION INTRAMUSCULAR; SUBCUTANEOUS 2 TIMES DAILY
Status: COMPLETED | OUTPATIENT
Start: 2023-09-28 | End: 2023-09-29

## 2023-09-28 NOTE — PROGRESS NOTES
NURSING ADMISSION NOTE      Patient admitted via Cart  Oriented to room. Safety precautions initiated. Bed in low position. Call light in reach. Pt admitted from home w/ spouse. Pt is Aox4, wears glasses, R arm precautions in place, VSS on 2L which is pt baseline, tele w/ NSR and ST upon exertion, receiving lovenox, daily weight, strict I/O, QID accucheck, up at sahil, receiving NS @125. Call light is within reach and pt updated on POC.

## 2023-09-28 NOTE — CM/SW NOTE
Notified by [facility] liaison pt is current with [facility] for RN, PT, and OT. DAYANA entered, [facility] liaison notified. Chart reviewed, pt has hx with [facility] for IV ABX and has home oxygen through [employer]. SW will continue to remain available for any additional discharge needs.     VICTORINO Bowens  Discharge Planner

## 2023-09-28 NOTE — PLAN OF CARE
Patient a/o x4. On 2L. NSR on tele. Voids. Up SB to bathroom. Strict I+O. C/o neck pain. Tylenol given. IVF NS at 150cc/hr. Call light within reach. No further needs at this time.    Problem: Diabetes/Glucose Control  Goal: Glucose maintained within prescribed range  Description: INTERVENTIONS:  - Monitor Blood Glucose as ordered  - Assess for signs and symptoms of hyperglycemia and hypoglycemia  - Administer ordered medications to maintain glucose within target range  - Assess barriers to adequate nutritional intake and initiate nutrition consult as needed  - Instruct patient on self management of diabetes  Outcome: Progressing     Problem: Patient/Family Goals  Goal: Patient/Family Long Term Goal  Description: Patient's Long Term Goal: to dc home    Interventions:  - IVF  - Neph consult  - See additional Care Plan goals for specific interventions  Outcome: Progressing  Goal: Patient/Family Short Term Goal  Description: Patient's Short Term Goal: to have decreased calcium levels    Interventions:   - IVF  - See additional Care Plan goals for specific interventions  Outcome: Progressing

## 2023-09-28 NOTE — HOME CARE LIAISON
Patient is current with [facility]. Please enter DAYANA order upon discharge. RN PT OT.

## 2023-09-28 NOTE — PROGRESS NOTES
[facility] Pharmacy Note:  Renal Dose Adjustment for Cetirizine (ZYRTEC)    Khushi Orbrenda has been prescribed Cetirizine (Zyrtec) 10 mg orally daily. Estimated Creatinine Clearance: 20.7 mL/min (A) (based on SCr of 2.68 mg/dL (H)). The dose has been changed to 5 mg orally daily per P&T approved protocol. Pharmacy will follow and resume original order if renal function improves.       Thank you,  Hayley Mackey, PharmD  9/27/2023  10:20 PM  [address] [facility]: [phone]

## 2023-09-28 NOTE — ED QUICK NOTES
Orders for admission, patient is aware of plan and ready to go upstairs. Any questions, please call ED RN Sisi at extension [phone].      Patient Covid vaccination status: Fully vaccinated     COVID Test Ordered in ED: None    COVID Suspicion at Admission: N/A    Running Infusions:  None    Mental Status/LOC at time of transport: a&ox4    Other pertinent information: home o2 at 2L, hard stick-attempting US line now  CIWA score: N/A   NIH score:  N/A

## 2023-09-29 PROBLEM — C50.911 HORMONE RECEPTOR POSITIVE MALIGNANT NEOPLASM OF RIGHT BREAST (HCC): Status: ACTIVE | Noted: 2023-09-29

## 2023-09-29 LAB
ANION GAP SERPL CALC-SCNC: 6 MMOL/L (ref 0–18)
BASOPHILS # BLD AUTO: 0.02 X10(3) UL (ref 0–0.2)
BASOPHILS NFR BLD AUTO: 0.4 %
BUN BLD-MCNC: 24 MG/DL (ref 7–18)
CALCIUM BLD-MCNC: 11.1 MG/DL (ref 8.5–10.1)
CHLORIDE SERPL-SCNC: 105 MMOL/L (ref 98–112)
CO2 SERPL-SCNC: 25 MMOL/L (ref 21–32)
CREAT BLD-MCNC: 2.08 MG/DL
EGFRCR SERPLBLD CKD-EPI 2021: 28 ML/MIN/1.73M2 (ref 60–?)
EOSINOPHIL # BLD AUTO: 0.73 X10(3) UL (ref 0–0.7)
EOSINOPHIL NFR BLD AUTO: 14.1 %
ERYTHROCYTE [DISTWIDTH] IN BLOOD BY AUTOMATED COUNT: 14.8 %
GLUCOSE BLD-MCNC: 157 MG/DL (ref 70–99)
GLUCOSE BLD-MCNC: 169 MG/DL (ref 70–99)
GLUCOSE BLD-MCNC: 177 MG/DL (ref 70–99)
GLUCOSE BLD-MCNC: 181 MG/DL (ref 70–99)
GLUCOSE BLD-MCNC: 184 MG/DL (ref 70–99)
GLUCOSE BLD-MCNC: 205 MG/DL (ref 70–99)
HCT VFR BLD AUTO: 27.9 %
HGB BLD-MCNC: 8.8 G/DL
IMM GRANULOCYTES # BLD AUTO: 0.03 X10(3) UL (ref 0–1)
IMM GRANULOCYTES NFR BLD: 0.6 %
KAPPA LC FREE SER-MCNC: 12.56 MG/DL (ref 0.33–1.94)
KAPPA LC FREE/LAMBDA FREE SER NEPH: 1.15 {RATIO} (ref 0.26–1.65)
LAMBDA LC FREE SERPL-MCNC: 10.96 MG/DL (ref 0.57–2.63)
LYMPHOCYTES # BLD AUTO: 0.59 X10(3) UL (ref 1–4)
LYMPHOCYTES NFR BLD AUTO: 11.4 %
MCH RBC QN AUTO: 31.3 PG (ref 26–34)
MCHC RBC AUTO-ENTMCNC: 31.5 G/DL (ref 31–37)
MCV RBC AUTO: 99.3 FL
MONOCYTES # BLD AUTO: 0.64 X10(3) UL (ref 0.1–1)
MONOCYTES NFR BLD AUTO: 12.3 %
NEUTROPHILS # BLD AUTO: 3.18 X10 (3) UL (ref 1.5–7.7)
NEUTROPHILS # BLD AUTO: 3.18 X10(3) UL (ref 1.5–7.7)
NEUTROPHILS NFR BLD AUTO: 61.2 %
OSMOLALITY SERPL CALC.SUM OF ELEC: 289 MOSM/KG (ref 275–295)
PLATELET # BLD AUTO: 96 10(3)UL (ref 150–450)
POTASSIUM SERPL-SCNC: 4.7 MMOL/L (ref 3.5–5.1)
RBC # BLD AUTO: 2.81 X10(6)UL
SODIUM SERPL-SCNC: 136 MMOL/L (ref 136–145)
WBC # BLD AUTO: 5.2 X10(3) UL (ref 4–11)

## 2023-09-29 PROCEDURE — 99232 SBSQ HOSP IP/OBS MODERATE 35: CPT | Performed by: INTERNAL MEDICINE

## 2023-09-29 RX ORDER — FUROSEMIDE 40 MG/1
40 TABLET ORAL ONCE
Status: COMPLETED | OUTPATIENT
Start: 2023-09-29 | End: 2023-09-29

## 2023-09-29 RX ORDER — ALPRAZOLAM 0.5 MG/1
0.5 TABLET ORAL 2 TIMES DAILY PRN
Status: DISCONTINUED | OUTPATIENT
Start: 2023-09-29 | End: 2023-09-30

## 2023-09-29 NOTE — PLAN OF CARE
Pt A&OX4. Glasses. Received on 2L (Baseline) sating WNL. . On tele. ST. Lovenox. IVF. QID accuchecks. C/o nausea - See MAR. No c/o pain at this time. Urine sent. Up standby. Pt updated on plan of care and verbalized understanding. Call light within reach. All needs met at this time. Will follow.      Problem: Diabetes/Glucose Control  Goal: Glucose maintained within prescribed range  Description: INTERVENTIONS:  - Monitor Blood Glucose as ordered  - Assess for signs and symptoms of hyperglycemia and hypoglycemia  - Administer ordered medications to maintain glucose within target range  - Assess barriers to adequate nutritional intake and initiate nutrition consult as needed  - Instruct patient on self management of diabetes  Outcome: Progressing     Problem: Patient/Family Goals  Goal: Patient/Family Long Term Goal  Description: Patient's Long Term Goal: to dc home    Interventions:  - IVF  - Neph consult  - See additional Care Plan goals for specific interventions  Outcome: Progressing  Goal: Patient/Family Short Term Goal  Description: Patient's Short Term Goal: to have decreased calcium levels  9/28 NOC: sleep, remain free from nausea    Interventions:   - IVF  - See additional Care Plan goals for specific interventions  Outcome: Progressing

## 2023-09-29 NOTE — PLAN OF CARE
Pt A/O x4, on 2L NC (baseline), NSR on tele, 1x dose lasix given, generalized edema. Pt voids, continent, up independently, strict I/O. Pt denies pain. PRN meds given for anxiety and nausea. Continuous fluids maintained. Psych and  consulted for unexpected death of patient's spouse today. Family at bedside in afternoon. Bereavement tray sent to room.     Problem: Patient/Family Goals  Goal: Patient/Family Long Term Goal  Description: Patient's Long Term Goal: to dc home    Interventions:  - IVF  - Neph consult  - See additional Care Plan goals for specific interventions  Outcome: Progressing     Problem: Patient/Family Goals  Goal: Patient/Family Short Term Goal  Description: Patient's Short Term Goal: to have decreased calcium levels  9/28 NOC: sleep, remain free from nausea  9/29: visit  after procedure    Interventions:   - IVF  - See additional Care Plan goals for specific interventions  Outcome: Progressing

## 2023-09-29 NOTE — PROGRESS NOTES
[id number]   Clinical Encounter Type   Visited With Patient   Referral From Nurse; Other (Comment)  (Consult List)   Family Spiritual Encounters   Family Coping Anxiety; Sadness   Taxonomy   Intended Effects Promote a sense of peace   Methods Offer emotional support; Offer spiritual/Restorationism support;Explore spiritual/Restorationism beliefs   Interventions [place]; Active listening; Ask guided questions; Ask questions to bring forth feelings; Discuss frustrations with someone; Discuss concerns     Visit summary:    Pt was awake when I arrived for visit. As soon as I mentioned that I was a  she expressed emotion. The Pt is undergoing treatment here and her   earlier today at home while preparing to visit the Pt here. She is very grief stricken. I listened and just allowed Pt to process all that has happened in one day. I provided comfort and encouragement. We explored her mery and how she was feeling about her mery during this time. Pt expressed a deep thanks for visiting with her. Pt does have a lot of support at home from family members and a personal therapist. I made her aware that I there is a  available if it is needed.     Kerwin Multani  Chaplain Resident  Ext: [phone]

## 2023-09-30 VITALS
HEART RATE: 83 BPM | DIASTOLIC BLOOD PRESSURE: 85 MMHG | WEIGHT: 293 LBS | SYSTOLIC BLOOD PRESSURE: 158 MMHG | TEMPERATURE: 98 F | BODY MASS INDEX: 50.02 KG/M2 | RESPIRATION RATE: 18 BRPM | HEIGHT: 64 IN | OXYGEN SATURATION: 98 %

## 2023-09-30 LAB
ALBUMIN SERPL-MCNC: 2.7 G/DL (ref 3.4–5)
ANION GAP SERPL CALC-SCNC: 5 MMOL/L (ref 0–18)
ANION GAP SERPL CALC-SCNC: 7 MMOL/L (ref 0–18)
BASOPHILS # BLD AUTO: 0.02 X10(3) UL (ref 0–0.2)
BASOPHILS NFR BLD AUTO: 0.4 %
BUN BLD-MCNC: 19 MG/DL (ref 7–18)
BUN BLD-MCNC: 21 MG/DL (ref 7–18)
CALCIUM BLD-MCNC: 10.2 MG/DL (ref 8.5–10.1)
CALCIUM BLD-MCNC: 11.3 MG/DL (ref 8.5–10.1)
CHLORIDE SERPL-SCNC: 101 MMOL/L (ref 98–112)
CHLORIDE SERPL-SCNC: 105 MMOL/L (ref 98–112)
CO2 SERPL-SCNC: 25 MMOL/L (ref 21–32)
CO2 SERPL-SCNC: 28 MMOL/L (ref 21–32)
CREAT BLD-MCNC: 2.03 MG/DL
CREAT BLD-MCNC: 2.05 MG/DL
EGFRCR SERPLBLD CKD-EPI 2021: 28 ML/MIN/1.73M2 (ref 60–?)
EGFRCR SERPLBLD CKD-EPI 2021: 29 ML/MIN/1.73M2 (ref 60–?)
EOSINOPHIL # BLD AUTO: 0.66 X10(3) UL (ref 0–0.7)
EOSINOPHIL NFR BLD AUTO: 14.3 %
ERYTHROCYTE [DISTWIDTH] IN BLOOD BY AUTOMATED COUNT: 15.1 %
GLUCOSE BLD-MCNC: 132 MG/DL (ref 70–99)
GLUCOSE BLD-MCNC: 143 MG/DL (ref 70–99)
GLUCOSE BLD-MCNC: 148 MG/DL (ref 70–99)
GLUCOSE BLD-MCNC: 197 MG/DL (ref 70–99)
HCT VFR BLD AUTO: 25.1 %
HGB BLD-MCNC: 8.2 G/DL
IMM GRANULOCYTES # BLD AUTO: 0.02 X10(3) UL (ref 0–1)
IMM GRANULOCYTES NFR BLD: 0.4 %
LYMPHOCYTES # BLD AUTO: 0.57 X10(3) UL (ref 1–4)
LYMPHOCYTES NFR BLD AUTO: 12.4 %
MAGNESIUM SERPL-MCNC: 0.9 MG/DL (ref 1.6–2.6)
MAGNESIUM SERPL-MCNC: 2 MG/DL (ref 1.6–2.6)
MCH RBC QN AUTO: 31.9 PG (ref 26–34)
MCHC RBC AUTO-ENTMCNC: 32.7 G/DL (ref 31–37)
MCV RBC AUTO: 97.7 FL
MONOCYTES # BLD AUTO: 0.63 X10(3) UL (ref 0.1–1)
MONOCYTES NFR BLD AUTO: 13.7 %
NEUTROPHILS # BLD AUTO: 2.71 X10 (3) UL (ref 1.5–7.7)
NEUTROPHILS # BLD AUTO: 2.71 X10(3) UL (ref 1.5–7.7)
NEUTROPHILS NFR BLD AUTO: 58.8 %
OSMOLALITY SERPL CALC.SUM OF ELEC: 281 MOSM/KG (ref 275–295)
OSMOLALITY SERPL CALC.SUM OF ELEC: 291 MOSM/KG (ref 275–295)
PHOSPHATE SERPL-MCNC: 2.3 MG/DL (ref 2.5–4.9)
PLATELET # BLD AUTO: 94 10(3)UL (ref 150–450)
POTASSIUM SERPL-SCNC: 4.1 MMOL/L (ref 3.5–5.1)
POTASSIUM SERPL-SCNC: 4.5 MMOL/L (ref 3.5–5.1)
RBC # BLD AUTO: 2.57 X10(6)UL
SODIUM SERPL-SCNC: 133 MMOL/L (ref 136–145)
SODIUM SERPL-SCNC: 138 MMOL/L (ref 136–145)
WBC # BLD AUTO: 4.6 X10(3) UL (ref 4–11)

## 2023-09-30 RX ORDER — INSULIN GLARGINE 100 [IU]/ML
30 INJECTION, SOLUTION SUBCUTANEOUS 2 TIMES DAILY
Status: SHIPPED | COMMUNITY
Start: 2023-09-30

## 2023-09-30 NOTE — DISCHARGE PLANNING
NURSING DISCHARGE NOTE    Discharged Home via Wheelchair. Accompanied by Family member and Support staff  Belongings Taken by patient/family. Magnesium infusion completed. Redraw 2.0. DC order received. POC discussed with providers and patient. Tele removed, tele tech notified, IV removed. DC paperwork/ instructions discussed with pt at bedside. Pt dressed and belongings gathered. Discharged with home O2. Wheeled out by PCT with family present.

## 2023-09-30 NOTE — PROGRESS NOTES
Brief Note     CBC and BMP are stable. Ca improving. Recent Labs   Lab 09/28/23  0717 09/29/23  0834 09/30/23  0651   * 157* 143*   BUN 29* 24* 21*   CREATSERUM 2.61* 2.08* 2.05*   EGFRCR 21* 28* 28*   CA 12.4* 11.1* 10.2*    136 138   K 4.3 4.7 4.1    105 105   CO2 28.0 25.0 28.0       Recent Labs   Lab 09/28/23  0717 09/29/23  0947 09/30/23  0651   RBC 2.68* 2.81* 2.57*   HGB 8.7* 8.8* 8.2*   HCT 27.5* 27.9* 25.1*   .6* 99.3 97.7   MCH 32.5 31.3 31.9   MCHC 31.6 31.5 32.7   RDW 14.9 14.8 15.1   NEPRELIM 2.08 3.18 2.71   WBC 3.8* 5.2 4.6   PLT 94.0* 96.0* 94.0*       PET/CT scheduled for 10/9/23 and SPEP pending.  Ok to discharge from an oncology standpoint

## 2023-09-30 NOTE — PLAN OF CARE
Pt A/O x4, on 2 L NC (baseline), NSR on tele. Pt did have one run of vtach with critically low magnesium of 0.9. Nephrology and hospitalist notified. Magnesium replaced. Pt voids, up independently. Denies pain. Okay to DC when magnesium infusion complete and repeat magnesium drawn, per hospitalist.     Family at bedside.      Problem: Patient/Family Goals  Goal: Patient/Family Long Term Goal  Description: Patient's Long Term Goal: to dc home    Interventions:  - IVF  - Neph consult  - See additional Care Plan goals for specific interventions  Outcome: Progressing     Problem: Patient/Family Goals  Goal: Patient/Family Short Term Goal  Description: Patient's Short Term Goal: to have decreased calcium levels  9/28 NOC: sleep, remain free from nausea  9/29: visit  after procedure  9/29 [id number] Lu[address] Garcias: express feelings, sleep  9/30: emotional support    Interventions:   -   - IVF  - See additional Care Plan goals for specific interventions  Outcome: Progressing

## 2023-09-30 NOTE — PLAN OF CARE
Pt A&OX4. Glasses. Received on 2L (Baseline) sating WNL. . On tele. NSR/ST. Lovenox. IVF. QID accuchecks. C/o nausea - See MAR. C/o headache - See MAR. PRN xanax given per pt request. Up standby. Pt updated on plan of care and verbalized understanding. Call light within reach. All needs met at this time. Will follow.       Problem: Diabetes/Glucose Control  Goal: Glucose maintained within prescribed range  Description: INTERVENTIONS:  - Monitor Blood Glucose as ordered  - Assess for signs and symptoms of hyperglycemia and hypoglycemia  - Administer ordered medications to maintain glucose within target range  - Assess barriers to adequate nutritional intake and initiate nutrition consult as needed  - Instruct patient on self management of diabetes  Outcome: Progressing     Problem: Patient/Family Goals  Goal: Patient/Family Long Term Goal  Description: Patient's Long Term Goal: to dc home    Interventions:  - IVF  - Neph consult  - See additional Care Plan goals for specific interventions  Outcome: Progressing  Goal: Patient/Family Short Term Goal  Description: Patient's Short Term Goal: to have decreased calcium levels  9/28 NOC: sleep, remain free from nausea  9/29: visit  after procedure  9/29 NOC: express feelings, sleep    Interventions:   - IVF  - See additional Care Plan goals for specific interventions  Outcome: Progressing

## 2023-09-30 NOTE — PROGRESS NOTES
09/30/23 0515   Clinical Encounter Type   Visited With Patient   Routine Visit Follow-up   Crisis Visit Trauma   Referral From Nurse   Patient Spiritual Encounters   Spiritual Assessment Completed Yes   Family Spiritual Encounters   Family Coping Sadness; Anxiety   Taxonomy   Intended Effects Lessen anxiety   Methods Offer emotional support;Exploring hope   Interventions Active listening;Ask questions to bring forth feelings; Ask guided questions       Visit Summary:    Nurse called ED phone because Pt was feeling anxious about the death of her  earlier today. Pt was awake and alert when I arrived. I sat with Pt and actively listened. I asked guided questions to get the Pt to share as much as she wanted to share. She expressed gratitude for the visit.      Mehreen Ugalde  Chaplain Resident  Ext: [phone]

## 2023-10-02 ENCOUNTER — TELEPHONE (OUTPATIENT)
Dept: HEMATOLOGY/ONCOLOGY | Facility: HOSPITAL | Age: 53
End: 2023-10-02

## 2023-10-02 LAB — VITAMIN A: 23.8 UG/DL

## 2023-10-03 ENCOUNTER — LAB ENCOUNTER (OUTPATIENT)
Dept: LAB | Age: 53
End: 2023-10-03
Attending: INTERNAL MEDICINE
Payer: COMMERCIAL

## 2023-10-04 ENCOUNTER — LAB ENCOUNTER (OUTPATIENT)
Dept: LAB | Age: 53
End: 2023-10-04
Attending: INTERNAL MEDICINE
Payer: COMMERCIAL

## 2023-10-04 DIAGNOSIS — E83.52 HYPERCALCEMIA: Primary | ICD-10-CM

## 2023-10-04 DIAGNOSIS — N17.9 ACUTE KIDNEY INJURY (HCC): ICD-10-CM

## 2023-10-04 LAB
ALBUMIN SERPL ELPH-MCNC: 3.71 G/DL (ref 3.75–5.21)
ALBUMIN/GLOB SERPL: 0.75 {RATIO} (ref 1–2)
ALPHA1 GLOB SERPL ELPH-MCNC: 0.47 G/DL (ref 0.19–0.46)
ALPHA2 GLOB SERPL ELPH-MCNC: 0.88 G/DL (ref 0.48–1.05)
ANION GAP SERPL CALC-SCNC: 7 MMOL/L (ref 0–18)
B-GLOBULIN SERPL ELPH-MCNC: 1.31 G/DL (ref 0.68–1.23)
BUN BLD-MCNC: 16 MG/DL (ref 7–18)
CALCIUM BLD-MCNC: 11.9 MG/DL (ref 8.5–10.1)
CHLORIDE SERPL-SCNC: 103 MMOL/L (ref 98–112)
CO2 SERPL-SCNC: 29 MMOL/L (ref 21–32)
CREAT BLD-MCNC: 2.07 MG/DL
EGFRCR SERPLBLD CKD-EPI 2021: 28 ML/MIN/1.73M2 (ref 60–?)
FASTING STATUS PATIENT QL REPORTED: NO
GAMMA GLOB SERPL ELPH-MCNC: 2.32 G/DL (ref 0.62–1.7)
GLUCOSE BLD-MCNC: 196 MG/DL (ref 70–99)
KAPPA LC FREE SER-MCNC: 14.04 MG/DL (ref 0.33–1.94)
KAPPA LC FREE/LAMBDA FREE SER NEPH: 1.21 {RATIO} (ref 0.26–1.65)
LAMBDA LC FREE SERPL-MCNC: 11.6 MG/DL (ref 0.57–2.63)
MAGNESIUM SERPL-MCNC: 1.2 MG/DL (ref 1.6–2.6)
OSMOLALITY SERPL CALC.SUM OF ELEC: 295 MOSM/KG (ref 275–295)
PHOSPHATE SERPL-MCNC: 3.2 MG/DL (ref 2.5–4.9)
POTASSIUM SERPL-SCNC: 4.1 MMOL/L (ref 3.5–5.1)
PROT SERPL-MCNC: 8.7 G/DL (ref 6.4–8.2)
PTH-RELATED PEPTIDE: <2 PMOL/L
SODIUM SERPL-SCNC: 139 MMOL/L (ref 136–145)

## 2023-10-04 PROCEDURE — 36415 COLL VENOUS BLD VENIPUNCTURE: CPT

## 2023-10-04 PROCEDURE — 80048 BASIC METABOLIC PNL TOTAL CA: CPT

## 2023-10-04 PROCEDURE — 84100 ASSAY OF PHOSPHORUS: CPT

## 2023-10-04 PROCEDURE — 83735 ASSAY OF MAGNESIUM: CPT

## 2023-10-09 ENCOUNTER — PATIENT MESSAGE (OUTPATIENT)
Dept: HEMATOLOGY/ONCOLOGY | Facility: HOSPITAL | Age: 53
End: 2023-10-09

## 2023-10-09 ENCOUNTER — HOSPITAL ENCOUNTER (OUTPATIENT)
Dept: NUCLEAR MEDICINE | Facility: HOSPITAL | Age: 53
Discharge: HOME | DRG: 640 | End: 2023-10-09
Attending: INTERNAL MEDICINE
Payer: COMMERCIAL

## 2023-10-09 ENCOUNTER — HOSPITAL ENCOUNTER (OUTPATIENT)
Dept: NUCLEAR MEDICINE | Facility: HOSPITAL | Age: 53
Discharge: HOME | End: 2023-10-09
Attending: INTERNAL MEDICINE
Payer: COMMERCIAL

## 2023-10-09 ENCOUNTER — TELEPHONE (OUTPATIENT)
Dept: HEMATOLOGY/ONCOLOGY | Facility: HOSPITAL | Age: 53
End: 2023-10-09

## 2023-10-09 DIAGNOSIS — E83.52 HYPERCALCEMIA: ICD-10-CM

## 2023-10-09 DIAGNOSIS — C50.811 MALIGNANT NEOPLASM OF OVERLAPPING SITES OF RIGHT BREAST IN FEMALE, ESTROGEN RECEPTOR POSITIVE: ICD-10-CM

## 2023-10-09 DIAGNOSIS — Z17.0 MALIGNANT NEOPLASM OF OVERLAPPING SITES OF RIGHT BREAST IN FEMALE, ESTROGEN RECEPTOR POSITIVE: ICD-10-CM

## 2023-10-09 LAB — GLUCOSE BLD-MCNC: 161 MG/DL (ref 70–99)

## 2023-10-09 PROCEDURE — 82962 GLUCOSE BLOOD TEST: CPT

## 2023-10-09 PROCEDURE — 78815 PET IMAGE W/CT SKULL-THIGH: CPT | Performed by: INTERNAL MEDICINE

## 2023-10-09 NOTE — TELEPHONE ENCOUNTER
From: Surjit Wesley  To: Kp Case  Sent: 10/9/2023 1:59 PM CDT  Subject: Question regarding test results    Dr Blanca Mccloud,     I had some blood work done last week checking my calcium and magnesium. No one from [facility] called but I used an [facility] lab. My magnesium was low again. Can I take an over the counter supplement? If so dose?   Thanks [facility]

## 2023-10-09 NOTE — TELEPHONE ENCOUNTER
Sarah Sit with PET results. Needs to see IP for EBUS/bronch. SPEP showed no evidence of monoclonal protein. 24 hour urine was negative for light chains and PET did not show any uptake in the bones. I told Randell Patel we have not ruled out malignancy as yet and needs a biopsy of these nodes. Could also be granulomatous such as sarcoid which can cause hypercalcemia and uptake on PET.

## 2023-10-10 ENCOUNTER — TELEPHONE (OUTPATIENT)
Facility: CLINIC | Age: 53
End: 2023-10-10

## 2023-10-10 NOTE — TELEPHONE ENCOUNTER
Per Dr. Kenji Corcoran:    Please schedule appt this week. Pt notified MD wants to see her this week. Call transferred to . Pt has been scheduled for 10/24. Will attempt to have pt be seen this week by Dr. Kenji Corcoran. [facility] requesting call back to have her be seen this week by Dr. Kenji Corcoran. Awaiting call back/message. Pt is scheduled to see pt 10/12 @ 12:30 pm. Dr. Kenji Corcoran notified. OT initial evaluation  Activity: OOB w/ assistance

## 2023-10-12 ENCOUNTER — TELEPHONE (OUTPATIENT)
Facility: CLINIC | Age: 53
End: 2023-10-12

## 2023-10-12 ENCOUNTER — HOSPITAL ENCOUNTER (INPATIENT)
Facility: HOSPITAL | Age: 53
LOS: 3 days | Discharge: HOME | DRG: 640 | End: 2023-10-15
Attending: EMERGENCY MEDICINE | Admitting: INTERNAL MEDICINE
Payer: COMMERCIAL

## 2023-10-12 ENCOUNTER — HOSPITAL ENCOUNTER (INPATIENT)
Facility: HOSPITAL | Age: 53
LOS: 3 days | Discharge: HOME | End: 2023-10-15
Attending: EMERGENCY MEDICINE | Admitting: INTERNAL MEDICINE
Payer: COMMERCIAL

## 2023-10-12 ENCOUNTER — OFFICE VISIT (OUTPATIENT)
Facility: CLINIC | Age: 53
End: 2023-10-12
Payer: COMMERCIAL

## 2023-10-12 VITALS
RESPIRATION RATE: 16 BRPM | HEIGHT: 64 IN | DIASTOLIC BLOOD PRESSURE: 80 MMHG | HEART RATE: 89 BPM | SYSTOLIC BLOOD PRESSURE: 140 MMHG | BODY MASS INDEX: 58 KG/M2 | OXYGEN SATURATION: 99 %

## 2023-10-12 DIAGNOSIS — E83.52 HYPERCALCEMIA: Primary | ICD-10-CM

## 2023-10-12 DIAGNOSIS — J96.11 CHRONIC RESPIRATORY FAILURE WITH HYPOXIA (HCC): ICD-10-CM

## 2023-10-12 DIAGNOSIS — R59.0 THORACIC LYMPHADENOPATHY: Primary | ICD-10-CM

## 2023-10-12 DIAGNOSIS — J96.11 CHRONIC RESPIRATORY FAILURE WITH HYPOXIA (HCC): Primary | ICD-10-CM

## 2023-10-12 PROBLEM — R73.9 HYPERGLYCEMIA: Status: ACTIVE | Noted: 2023-10-12

## 2023-10-12 LAB
ALBUMIN SERPL-MCNC: 3.1 G/DL (ref 3.4–5)
ALBUMIN/GLOB SERPL: 0.6 {RATIO} (ref 1–2)
ALP LIVER SERPL-CCNC: 51 U/L
ALT SERPL-CCNC: 18 U/L
ANION GAP SERPL CALC-SCNC: 6 MMOL/L (ref 0–18)
AST SERPL-CCNC: 24 U/L (ref 15–37)
BASOPHILS # BLD AUTO: 0.03 X10(3) UL (ref 0–0.2)
BASOPHILS NFR BLD AUTO: 0.6 %
BILIRUB SERPL-MCNC: 0.6 MG/DL (ref 0.1–2)
BILIRUB UR QL STRIP.AUTO: NEGATIVE
BUN BLD-MCNC: 20 MG/DL (ref 7–18)
CA-I BLD-SCNC: 1.76 MMOL/L (ref 0.95–1.32)
CALCIUM BLD-MCNC: 13.9 MG/DL (ref 8.5–10.1)
CHLORIDE SERPL-SCNC: 101 MMOL/L (ref 98–112)
CLARITY UR REFRACT.AUTO: CLEAR
CO2 SERPL-SCNC: 31 MMOL/L (ref 21–32)
CREAT BLD-MCNC: 2.3 MG/DL
EGFRCR SERPLBLD CKD-EPI 2021: 25 ML/MIN/1.73M2 (ref 60–?)
EOSINOPHIL # BLD AUTO: 1.13 X10(3) UL (ref 0–0.7)
EOSINOPHIL NFR BLD AUTO: 23.3 %
ERYTHROCYTE [DISTWIDTH] IN BLOOD BY AUTOMATED COUNT: 15.4 %
GLOBULIN PLAS-MCNC: 5.6 G/DL (ref 2.8–4.4)
GLUCOSE BLD-MCNC: 136 MG/DL (ref 70–99)
GLUCOSE BLD-MCNC: 149 MG/DL (ref 70–99)
GLUCOSE UR STRIP.AUTO-MCNC: NORMAL MG/DL
HCT VFR BLD AUTO: 30.5 %
HGB BLD-MCNC: 9.9 G/DL
IMM GRANULOCYTES # BLD AUTO: 0.02 X10(3) UL (ref 0–1)
IMM GRANULOCYTES NFR BLD: 0.4 %
KETONES UR STRIP.AUTO-MCNC: NEGATIVE MG/DL
LEUKOCYTE ESTERASE UR QL STRIP.AUTO: NEGATIVE
LIPASE SERPL-CCNC: 56 U/L (ref 13–75)
LYMPHOCYTES # BLD AUTO: 0.76 X10(3) UL (ref 1–4)
LYMPHOCYTES NFR BLD AUTO: 15.7 %
MAGNESIUM SERPL-MCNC: 1.7 MG/DL (ref 1.6–2.6)
MCH RBC QN AUTO: 31.7 PG (ref 26–34)
MCHC RBC AUTO-ENTMCNC: 32.5 G/DL (ref 31–37)
MCV RBC AUTO: 97.8 FL
MONOCYTES # BLD AUTO: 0.63 X10(3) UL (ref 0.1–1)
MONOCYTES NFR BLD AUTO: 13 %
NEUTROPHILS # BLD AUTO: 2.28 X10 (3) UL (ref 1.5–7.7)
NEUTROPHILS # BLD AUTO: 2.28 X10(3) UL (ref 1.5–7.7)
NEUTROPHILS NFR BLD AUTO: 47 %
NITRITE UR QL STRIP.AUTO: NEGATIVE
OSMOLALITY SERPL CALC.SUM OF ELEC: 291 MOSM/KG (ref 275–295)
PH UR STRIP.AUTO: 5.5 [PH] (ref 5–8)
PHOSPHATE SERPL-MCNC: 1.7 MG/DL (ref 2.5–4.9)
PLATELET # BLD AUTO: 141 10(3)UL (ref 150–450)
POTASSIUM SERPL-SCNC: 3.6 MMOL/L (ref 3.5–5.1)
PROT SERPL-MCNC: 8.7 G/DL (ref 6.4–8.2)
PROT UR STRIP.AUTO-MCNC: 50 MG/DL
PTH-INTACT SERPL-MCNC: <6.3 PG/ML (ref 18.5–88)
RBC # BLD AUTO: 3.12 X10(6)UL
RBC UR QL AUTO: NEGATIVE
SODIUM SERPL-SCNC: 138 MMOL/L (ref 136–145)
SP GR UR STRIP.AUTO: 1.02 (ref 1–1.03)
TSI SER-ACNC: 2.29 MIU/ML (ref 0.36–3.74)
UROBILINOGEN UR STRIP.AUTO-MCNC: NORMAL MG/DL
WBC # BLD AUTO: 4.9 X10(3) UL (ref 4–11)

## 2023-10-12 PROCEDURE — 82652 VIT D 1 25-DIHYDROXY: CPT | Performed by: INTERNAL MEDICINE

## 2023-10-12 PROCEDURE — 93005 ELECTROCARDIOGRAM TRACING: CPT

## 2023-10-12 PROCEDURE — 83970 ASSAY OF PARATHORMONE: CPT | Performed by: EMERGENCY MEDICINE

## 2023-10-12 PROCEDURE — S0119 ONDANSETRON 4 MG: HCPCS | Performed by: EMERGENCY MEDICINE

## 2023-10-12 PROCEDURE — 99285 EMERGENCY DEPT VISIT HI MDM: CPT

## 2023-10-12 PROCEDURE — 80053 COMPREHEN METABOLIC PANEL: CPT | Performed by: EMERGENCY MEDICINE

## 2023-10-12 PROCEDURE — 93010 ELECTROCARDIOGRAM REPORT: CPT

## 2023-10-12 PROCEDURE — 82962 GLUCOSE BLOOD TEST: CPT

## 2023-10-12 PROCEDURE — 82330 ASSAY OF CALCIUM: CPT | Performed by: EMERGENCY MEDICINE

## 2023-10-12 PROCEDURE — 96360 HYDRATION IV INFUSION INIT: CPT

## 2023-10-12 PROCEDURE — 83735 ASSAY OF MAGNESIUM: CPT | Performed by: EMERGENCY MEDICINE

## 2023-10-12 PROCEDURE — 85025 COMPLETE CBC W/AUTO DIFF WBC: CPT | Performed by: EMERGENCY MEDICINE

## 2023-10-12 PROCEDURE — 96361 HYDRATE IV INFUSION ADD-ON: CPT

## 2023-10-12 PROCEDURE — 81001 URINALYSIS AUTO W/SCOPE: CPT | Performed by: EMERGENCY MEDICINE

## 2023-10-12 PROCEDURE — 84100 ASSAY OF PHOSPHORUS: CPT | Performed by: EMERGENCY MEDICINE

## 2023-10-12 PROCEDURE — 84443 ASSAY THYROID STIM HORMONE: CPT | Performed by: EMERGENCY MEDICINE

## 2023-10-12 PROCEDURE — 83690 ASSAY OF LIPASE: CPT | Performed by: EMERGENCY MEDICINE

## 2023-10-12 PROCEDURE — 96372 THER/PROPH/DIAG INJ SC/IM: CPT

## 2023-10-12 RX ORDER — NICOTINE POLACRILEX 4 MG
30 LOZENGE BUCCAL
Status: DISCONTINUED | OUTPATIENT
Start: 2023-10-12 | End: 2023-10-15

## 2023-10-12 RX ORDER — ALPRAZOLAM 0.25 MG/1
0.25 TABLET ORAL DAILY PRN
COMMUNITY
Start: 2023-09-27

## 2023-10-12 RX ORDER — HEPARIN SODIUM 5000 [USP'U]/ML
7500 INJECTION, SOLUTION INTRAVENOUS; SUBCUTANEOUS EVERY 8 HOURS SCHEDULED
Status: DISCONTINUED | OUTPATIENT
Start: 2023-10-12 | End: 2023-10-15

## 2023-10-12 RX ORDER — CALCITONIN SALMON 200 [USP'U]/ML
4 INJECTION, SOLUTION INTRAMUSCULAR; SUBCUTANEOUS 2 TIMES DAILY
Status: COMPLETED | OUTPATIENT
Start: 2023-10-12 | End: 2023-10-14

## 2023-10-12 RX ORDER — PROCHLORPERAZINE EDISYLATE 5 MG/ML
5 INJECTION INTRAMUSCULAR; INTRAVENOUS EVERY 8 HOURS PRN
Status: DISCONTINUED | OUTPATIENT
Start: 2023-10-12 | End: 2023-10-15

## 2023-10-12 RX ORDER — SODIUM CHLORIDE 9 MG/ML
INJECTION, SOLUTION INTRAVENOUS CONTINUOUS
Status: DISCONTINUED | OUTPATIENT
Start: 2023-10-12 | End: 2023-10-13

## 2023-10-12 RX ORDER — ONDANSETRON 2 MG/ML
4 INJECTION INTRAMUSCULAR; INTRAVENOUS EVERY 6 HOURS PRN
Status: DISCONTINUED | OUTPATIENT
Start: 2023-10-12 | End: 2023-10-15

## 2023-10-12 RX ORDER — ACETAMINOPHEN 500 MG
500 TABLET ORAL EVERY 4 HOURS PRN
Status: DISCONTINUED | OUTPATIENT
Start: 2023-10-12 | End: 2023-10-15

## 2023-10-12 RX ORDER — NICOTINE POLACRILEX 4 MG
15 LOZENGE BUCCAL
Status: DISCONTINUED | OUTPATIENT
Start: 2023-10-12 | End: 2023-10-15

## 2023-10-12 RX ORDER — ONDANSETRON 4 MG/1
4 TABLET, ORALLY DISINTEGRATING ORAL ONCE
Status: COMPLETED | OUTPATIENT
Start: 2023-10-12 | End: 2023-10-12

## 2023-10-12 RX ORDER — ALLOPURINOL 100 MG/1
TABLET ORAL
COMMUNITY
Start: 2023-09-24 | End: 2023-10-15

## 2023-10-12 RX ORDER — ZOLPIDEM TARTRATE 10 MG/1
TABLET ORAL
COMMUNITY
Start: 2023-10-04

## 2023-10-12 RX ORDER — SITAGLIPTIN 50 MG/1
50 TABLET, FILM COATED ORAL DAILY
COMMUNITY
Start: 2023-10-09

## 2023-10-12 RX ORDER — DEXTROSE MONOHYDRATE 25 G/50ML
50 INJECTION, SOLUTION INTRAVENOUS
Status: DISCONTINUED | OUTPATIENT
Start: 2023-10-12 | End: 2023-10-15

## 2023-10-12 RX ORDER — SODIUM CHLORIDE 9 MG/ML
1000 INJECTION, SOLUTION INTRAVENOUS ONCE
Status: COMPLETED | OUTPATIENT
Start: 2023-10-12 | End: 2023-10-12

## 2023-10-12 NOTE — PATIENT INSTRUCTIONS
We will schedule you for a bronchoscopy.   Obtain a breathing test  We'll see if [facility] can perform the walk test

## 2023-10-12 NOTE — ED INITIAL ASSESSMENT (HPI)
Pt reports multiple high readings for calcium recently, had recent hospitalization and since then has been on home o2, had routine lab draw this am

## 2023-10-12 NOTE — TELEPHONE ENCOUNTER
Per Dr. Virginia Grider: Please contact [facility] to see if they can perform walk test for POC. If not then please set her up with [facility] for POC/ 6MWT. Call made to [facility], rep states we need to fax order to office. Order and progress visit note faxed over. Call made to patient and asked if would like to come in to have test done in office. [facility] rep stated it may take some time for her to have test done. Pt states she is on her way to the ER due to hypercalcemia. She was sent by Dr. Leti Wang (nephrologist).

## 2023-10-13 LAB
ALBUMIN SERPL-MCNC: 2.7 G/DL (ref 3.4–5)
ANION GAP SERPL CALC-SCNC: 6 MMOL/L (ref 0–18)
BUN BLD-MCNC: 19 MG/DL (ref 7–18)
CA-I BLD-SCNC: 1.68 MMOL/L (ref 0.95–1.32)
CALCIUM BLD-MCNC: 12.8 MG/DL (ref 8.5–10.1)
CHLORIDE SERPL-SCNC: 103 MMOL/L (ref 98–112)
CO2 SERPL-SCNC: 30 MMOL/L (ref 21–32)
CREAT BLD-MCNC: 2.06 MG/DL
EGFRCR SERPLBLD CKD-EPI 2021: 28 ML/MIN/1.73M2 (ref 60–?)
ERYTHROCYTE [DISTWIDTH] IN BLOOD BY AUTOMATED COUNT: 15.2 %
GLUCOSE BLD-MCNC: 147 MG/DL (ref 70–99)
GLUCOSE BLD-MCNC: 148 MG/DL (ref 70–99)
GLUCOSE BLD-MCNC: 163 MG/DL (ref 70–99)
GLUCOSE BLD-MCNC: 172 MG/DL (ref 70–99)
GLUCOSE BLD-MCNC: 176 MG/DL (ref 70–99)
HCT VFR BLD AUTO: 26.8 %
HGB BLD-MCNC: 8.5 G/DL
MAGNESIUM SERPL-MCNC: 1.5 MG/DL (ref 1.6–2.6)
MCH RBC QN AUTO: 32.1 PG (ref 26–34)
MCHC RBC AUTO-ENTMCNC: 31.7 G/DL (ref 31–37)
MCV RBC AUTO: 101.1 FL
OSMOLALITY SERPL CALC.SUM OF ELEC: 293 MOSM/KG (ref 275–295)
PHOSPHATE SERPL-MCNC: 2.3 MG/DL (ref 2.5–4.9)
PLATELET # BLD AUTO: 125 10(3)UL (ref 150–450)
POTASSIUM SERPL-SCNC: 3.7 MMOL/L (ref 3.5–5.1)
PTH-INTACT SERPL-MCNC: <6.3 PG/ML (ref 18.5–88)
RBC # BLD AUTO: 2.65 X10(6)UL
SODIUM SERPL-SCNC: 139 MMOL/L (ref 136–145)
WBC # BLD AUTO: 4.9 X10(3) UL (ref 4–11)

## 2023-10-13 PROCEDURE — 85027 COMPLETE CBC AUTOMATED: CPT | Performed by: HOSPITALIST

## 2023-10-13 PROCEDURE — 82652 VIT D 1 25-DIHYDROXY: CPT | Performed by: HOSPITALIST

## 2023-10-13 PROCEDURE — 82542 COL CHROMOTOGRAPHY QUAL/QUAN: CPT | Performed by: INTERNAL MEDICINE

## 2023-10-13 PROCEDURE — 80069 RENAL FUNCTION PANEL: CPT | Performed by: INTERNAL MEDICINE

## 2023-10-13 PROCEDURE — 82962 GLUCOSE BLOOD TEST: CPT

## 2023-10-13 PROCEDURE — 83970 ASSAY OF PARATHORMONE: CPT | Performed by: INTERNAL MEDICINE

## 2023-10-13 PROCEDURE — 83735 ASSAY OF MAGNESIUM: CPT | Performed by: INTERNAL MEDICINE

## 2023-10-13 PROCEDURE — 82330 ASSAY OF CALCIUM: CPT | Performed by: INTERNAL MEDICINE

## 2023-10-13 RX ORDER — GABAPENTIN 300 MG/1
600 CAPSULE ORAL 2 TIMES DAILY
Status: DISCONTINUED | OUTPATIENT
Start: 2023-10-13 | End: 2023-10-15

## 2023-10-13 RX ORDER — TAMOXIFEN CITRATE 10 MG/1
20 TABLET ORAL DAILY
Status: DISCONTINUED | OUTPATIENT
Start: 2023-10-13 | End: 2023-10-15

## 2023-10-13 RX ORDER — ALPRAZOLAM 0.25 MG/1
0.25 TABLET ORAL DAILY PRN
Status: DISCONTINUED | OUTPATIENT
Start: 2023-10-13 | End: 2023-10-15

## 2023-10-13 RX ORDER — ALLOPURINOL 300 MG/1
300 TABLET ORAL DAILY
Status: DISCONTINUED | OUTPATIENT
Start: 2023-10-13 | End: 2023-10-15

## 2023-10-13 RX ORDER — CARVEDILOL 12.5 MG/1
12.5 TABLET ORAL 2 TIMES DAILY WITH MEALS
Status: DISCONTINUED | OUTPATIENT
Start: 2023-10-13 | End: 2023-10-15

## 2023-10-13 RX ORDER — ALPRAZOLAM 0.25 MG/1
0.5 TABLET ORAL
Status: DISCONTINUED | OUTPATIENT
Start: 2023-10-13 | End: 2023-10-15

## 2023-10-13 RX ORDER — CLONIDINE HYDROCHLORIDE 0.1 MG/1
0.1 TABLET ORAL 2 TIMES DAILY
Status: DISCONTINUED | OUTPATIENT
Start: 2023-10-13 | End: 2023-10-15

## 2023-10-13 RX ORDER — SODIUM CHLORIDE 9 MG/ML
INJECTION, SOLUTION INTRAVENOUS CONTINUOUS
Status: DISCONTINUED | OUTPATIENT
Start: 2023-10-13 | End: 2023-10-15

## 2023-10-13 RX ORDER — ZOLPIDEM TARTRATE 5 MG/1
10 TABLET ORAL NIGHTLY PRN
Status: DISCONTINUED | OUTPATIENT
Start: 2023-10-13 | End: 2023-10-15

## 2023-10-13 RX ORDER — CETIRIZINE HYDROCHLORIDE 10 MG/1
10 TABLET ORAL DAILY
Status: DISCONTINUED | OUTPATIENT
Start: 2023-10-13 | End: 2023-10-14

## 2023-10-13 RX ORDER — HYDRALAZINE HYDROCHLORIDE 25 MG/1
25 TABLET, FILM COATED ORAL EVERY 8 HOURS PRN
Status: DISCONTINUED | OUTPATIENT
Start: 2023-10-13 | End: 2023-10-15

## 2023-10-13 RX ORDER — FUROSEMIDE 10 MG/ML
40 INJECTION INTRAMUSCULAR; INTRAVENOUS ONCE
Status: COMPLETED | OUTPATIENT
Start: 2023-10-13 | End: 2023-10-13

## 2023-10-13 RX ORDER — FOLIC ACID 1 MG/1
1 TABLET ORAL DAILY
Status: DISCONTINUED | OUTPATIENT
Start: 2023-10-13 | End: 2023-10-15

## 2023-10-13 RX ORDER — MAGNESIUM SULFATE HEPTAHYDRATE 40 MG/ML
2 INJECTION, SOLUTION INTRAVENOUS ONCE
Qty: 50 ML | Refills: 0 | Status: COMPLETED | OUTPATIENT
Start: 2023-10-13 | End: 2023-10-13

## 2023-10-13 NOTE — PLAN OF CARE
NURSING ADMISSION NOTE      Patient admitted via Cart  Oriented to room. Safety precautions initiated. Bed in low position. Call light in reach. Assumed care of patient at 2300--admitted from ER with hypercalcemia. Alert and oriented x4--patient very tearful at times (recently lost ), No C/O chest pain or SOB, SPO2 on 2L (current home baseline) 92%, Heart rate SR/ST 90s-100s. Breath sounds clear and diminished. Admission and med rec completed. IVF infusing per orders. Bed is locked and in low position. Personal belonging within reach. Updated on plan of care and verbalized understanding. No concerns voiced or noted at this time.      Problem: Diabetes/Glucose Control  Goal: Glucose maintained within prescribed range  Description: INTERVENTIONS:  - Monitor Blood Glucose as ordered  - Assess for signs and symptoms of hyperglycemia and hypoglycemia  - Administer ordered medications to maintain glucose within target range  - Assess barriers to adequate nutritional intake and initiate nutrition consult as needed  - Instruct patient on self management of diabetes  Outcome: Progressing

## 2023-10-13 NOTE — PROGRESS NOTES
10/13/23 1314   Clinical Encounter Type   Visited With Health care provider;Patient and family together   Patient's Supportive Strategies/Resources Family and friends   Anabaptist Encounters   Anabaptist Needs Prayer   Family Spiritual Encounters   Family Coping   (sister was present)   Taxonomy   Intended Effects Lessen anxiety   Methods Encourage self care;Encourage self reflection;Encourage sharing of feelings;Encourage story-telling; Offer spiritual/Episcopalian support; Offer emotional support   Interventions Acknowledge current situation; Active listening;Explain  role; Identify supportive relationship(s); Provide Grief Processing Session;[place]      checked in w/ the RN. Introduced self to Sesar and to her sister. Listened compassionately to the story of previous (2 weeks ago) grief and loss regarding Marcia's spouse. Zack Syed w/Marcia and sister in their grief process. Expressed emotions / processed feelings / received validation / shared reminiscences / tearfully verbalized about the loss of the loved one. Offered resources for supportive grief groups. Per request, prayed and promoted a sense of inner peace, comfort and understanding. Established a supportive/caring relationship. Also, provided Spiritual care card w/ direct contact information/resources. Spiritual Care support can be requested via an Alai consult. For urgent/immediate needs, please contact the On Call  at ext. [phone]. Rev. Nicky Lake M.Div., M.T.S., [facility] CGCS.   Spiritual Care Lead

## 2023-10-13 NOTE — DISCHARGE INSTRUCTIONS
Resume services with [facility]  [phone]    Hold [place] until calcium improves on outpatient labs.

## 2023-10-14 LAB
ALBUMIN SERPL-MCNC: 2.9 G/DL (ref 3.4–5)
ALBUMIN SERPL-MCNC: 3.1 G/DL (ref 3.4–5)
ANION GAP SERPL CALC-SCNC: 4 MMOL/L (ref 0–18)
ANION GAP SERPL CALC-SCNC: 4 MMOL/L (ref 0–18)
ATRIAL RATE: 82 BPM
BUN BLD-MCNC: 14 MG/DL (ref 7–18)
BUN BLD-MCNC: 15 MG/DL (ref 7–18)
CALCIUM BLD-MCNC: 11.3 MG/DL (ref 8.5–10.1)
CALCIUM BLD-MCNC: 11.5 MG/DL (ref 8.5–10.1)
CHLORIDE SERPL-SCNC: 104 MMOL/L (ref 98–112)
CHLORIDE SERPL-SCNC: 105 MMOL/L (ref 98–112)
CO2 SERPL-SCNC: 29 MMOL/L (ref 21–32)
CO2 SERPL-SCNC: 30 MMOL/L (ref 21–32)
CREAT BLD-MCNC: 1.85 MG/DL
CREAT BLD-MCNC: 1.87 MG/DL
EGFRCR SERPLBLD CKD-EPI 2021: 32 ML/MIN/1.73M2 (ref 60–?)
EGFRCR SERPLBLD CKD-EPI 2021: 32 ML/MIN/1.73M2 (ref 60–?)
GLUCOSE BLD-MCNC: 144 MG/DL (ref 70–99)
GLUCOSE BLD-MCNC: 151 MG/DL (ref 70–99)
GLUCOSE BLD-MCNC: 152 MG/DL (ref 70–99)
GLUCOSE BLD-MCNC: 153 MG/DL (ref 70–99)
GLUCOSE BLD-MCNC: 157 MG/DL (ref 70–99)
GLUCOSE BLD-MCNC: 99 MG/DL (ref 70–99)
MAGNESIUM SERPL-MCNC: 1.4 MG/DL (ref 1.6–2.6)
OSMOLALITY SERPL CALC.SUM OF ELEC: 287 MOSM/KG (ref 275–295)
OSMOLALITY SERPL CALC.SUM OF ELEC: 291 MOSM/KG (ref 275–295)
P AXIS: 41 DEGREES
P-R INTERVAL: 190 MS
PHOSPHATE SERPL-MCNC: 2.2 MG/DL (ref 2.5–4.9)
PHOSPHATE SERPL-MCNC: 2.2 MG/DL (ref 2.5–4.9)
POTASSIUM SERPL-SCNC: 3.5 MMOL/L (ref 3.5–5.1)
POTASSIUM SERPL-SCNC: 3.5 MMOL/L (ref 3.5–5.1)
Q-T INTERVAL: 368 MS
QRS DURATION: 92 MS
QTC CALCULATION (BEZET): 429 MS
R AXIS: 38 DEGREES
SODIUM SERPL-SCNC: 137 MMOL/L (ref 136–145)
SODIUM SERPL-SCNC: 139 MMOL/L (ref 136–145)
T AXIS: -14 DEGREES
VENTRICULAR RATE: 82 BPM
VIT D 1,25 DI-OH: 86.2 PG/ML

## 2023-10-14 PROCEDURE — 80069 RENAL FUNCTION PANEL: CPT | Performed by: INTERNAL MEDICINE

## 2023-10-14 PROCEDURE — 83735 ASSAY OF MAGNESIUM: CPT | Performed by: INTERNAL MEDICINE

## 2023-10-14 PROCEDURE — 82962 GLUCOSE BLOOD TEST: CPT

## 2023-10-14 PROCEDURE — 82330 ASSAY OF CALCIUM: CPT | Performed by: INTERNAL MEDICINE

## 2023-10-14 RX ORDER — POTASSIUM CHLORIDE 1.5 G/1.58G
20 POWDER, FOR SOLUTION ORAL ONCE
Status: COMPLETED | OUTPATIENT
Start: 2023-10-14 | End: 2023-10-14

## 2023-10-14 RX ORDER — FUROSEMIDE 10 MG/ML
40 INJECTION INTRAMUSCULAR; INTRAVENOUS ONCE
Status: COMPLETED | OUTPATIENT
Start: 2023-10-14 | End: 2023-10-14

## 2023-10-14 RX ORDER — CETIRIZINE HYDROCHLORIDE 5 MG/1
5 TABLET ORAL DAILY
Status: DISCONTINUED | OUTPATIENT
Start: 2023-10-14 | End: 2023-10-15

## 2023-10-14 RX ORDER — SIMETHICONE 80 MG
80 TABLET,CHEWABLE ORAL 4 TIMES DAILY PRN
Status: DISCONTINUED | OUTPATIENT
Start: 2023-10-14 | End: 2023-10-15

## 2023-10-14 NOTE — PLAN OF CARE
Assumed pt care at 0730. A&O x 4. Pt on room air. Pt denies pain and reports nausea, IV zofran given. Vital signs stable, NSR on tele. Up with SBA. Generalized edema present in upper and lower extremities, non pitting x4. Plan of care: electrolyte replacement, IV fluids, DC planning. Plan of care updated with patient. Questions answered, pt verbalized understanding. Call light is within reach. All needs met at this time.     Problem: Diabetes/Glucose Control  Goal: Glucose maintained within prescribed range  Description: INTERVENTIONS:  - Monitor Blood Glucose as ordered  - Assess for signs and symptoms of hyperglycemia and hypoglycemia  - Administer ordered medications to maintain glucose within target range  - Assess barriers to adequate nutritional intake and initiate nutrition consult as needed  - Instruct patient on self management of diabetes  Outcome: Progressing     Problem: Patient/Family Goals  Goal: Patient/Family Long Term Goal  Description: Patient's Long Term Goal: Stay out of the hospital     Interventions:  - Follow up appointments  - Med compliance  - See additional Care Plan goals for specific interventions  Outcome: Progressing  Goal: Patient/Family Short Term Goal  Description: Patient's Short Term Goal: Feel better    Interventions:   - Med administration, diuresis   - See additional Care Plan goals for specific interventions  Outcome: Progressing

## 2023-10-14 NOTE — PLAN OF CARE
Patient is alert & oriented x4. Pt ambulates w/ SBA. Breath sounds are clear bilateral. On 2L NC. Normal sinus rhythm. No pain reported. Skin dry and intact. IVF @200ml/hr overnight. Bed in low position and call light within reach. Reviewed plan of care and patient verbalizes understanding.         Problem: Diabetes/Glucose Control  Goal: Glucose maintained within prescribed range  Description: INTERVENTIONS:  - Monitor Blood Glucose as ordered  - Assess for signs and symptoms of hyperglycemia and hypoglycemia  - Administer ordered medications to maintain glucose within target range  - Assess barriers to adequate nutritional intake and initiate nutrition consult as needed  - Instruct patient on self management of diabetes  Outcome: Progressing     Problem: Patient/Family Goals  Goal: Patient/Family Long Term Goal  Description: Patient's Long Term Goal: Stay out of the hospital     Interventions:  - Follow up appointments  - Med compliance  - See additional Care Plan goals for specific interventions  Outcome: Progressing  Goal: Patient/Family Short Term Goal  Description: Patient's Short Term Goal: Feel better    Interventions:   - Med administration, diuresis   - See additional Care Plan goals for specific interventions  Outcome: Progressing

## 2023-10-14 NOTE — PROGRESS NOTES
[facility] Pharmacy Note:  Renal Dose Adjustment for Cetirizine (ZYRTEC)    Chares Bound has been prescribed Cetirizine (Zyrtec) 10 mg orally daily. Estimated Creatinine Clearance: 27 mL/min (A) (based on SCr of 2.06 mg/dL (H)). The dose has been changed to 5 mg orally daily per P&T approved protocol. Pharmacy will follow and resume original order if renal function improves.       Thank you,  Elsa Olivia, PharmD  10/14/2023  8:11 AM  [address] [place] [address] Extension: [phone]

## 2023-10-15 VITALS
TEMPERATURE: 99 F | RESPIRATION RATE: 18 BRPM | HEART RATE: 83 BPM | WEIGHT: 293 LBS | BODY MASS INDEX: 50.02 KG/M2 | DIASTOLIC BLOOD PRESSURE: 75 MMHG | OXYGEN SATURATION: 96 % | HEIGHT: 64 IN | SYSTOLIC BLOOD PRESSURE: 157 MMHG

## 2023-10-15 LAB
ALBUMIN SERPL-MCNC: 2.6 G/DL (ref 3.4–5)
ANION GAP SERPL CALC-SCNC: 4 MMOL/L (ref 0–18)
BUN BLD-MCNC: 13 MG/DL (ref 7–18)
CALCIUM BLD-MCNC: 10 MG/DL (ref 8.5–10.1)
CHLORIDE SERPL-SCNC: 107 MMOL/L (ref 98–112)
CO2 SERPL-SCNC: 29 MMOL/L (ref 21–32)
CREAT BLD-MCNC: 1.73 MG/DL
EGFRCR SERPLBLD CKD-EPI 2021: 35 ML/MIN/1.73M2 (ref 60–?)
GLUCOSE BLD-MCNC: 105 MG/DL (ref 70–99)
GLUCOSE BLD-MCNC: 105 MG/DL (ref 70–99)
MAGNESIUM SERPL-MCNC: 1.4 MG/DL (ref 1.6–2.6)
OSMOLALITY SERPL CALC.SUM OF ELEC: 290 MOSM/KG (ref 275–295)
PHOSPHATE SERPL-MCNC: 2.2 MG/DL (ref 2.5–4.9)
POTASSIUM SERPL-SCNC: 3.3 MMOL/L (ref 3.5–5.1)
SODIUM SERPL-SCNC: 140 MMOL/L (ref 136–145)

## 2023-10-15 PROCEDURE — 80069 RENAL FUNCTION PANEL: CPT | Performed by: INTERNAL MEDICINE

## 2023-10-15 PROCEDURE — 82962 GLUCOSE BLOOD TEST: CPT

## 2023-10-15 PROCEDURE — 83735 ASSAY OF MAGNESIUM: CPT | Performed by: INTERNAL MEDICINE

## 2023-10-15 RX ORDER — POTASSIUM CHLORIDE 20 MEQ/1
40 TABLET, EXTENDED RELEASE ORAL ONCE
Status: COMPLETED | OUTPATIENT
Start: 2023-10-15 | End: 2023-10-15

## 2023-10-15 RX ORDER — MAGNESIUM OXIDE 400 MG/1
800 TABLET ORAL ONCE
Status: COMPLETED | OUTPATIENT
Start: 2023-10-15 | End: 2023-10-15

## 2023-10-15 NOTE — PLAN OF CARE
Pt is received at 0700. A&O x 4. Denies any pain. Lungs are clear, diminished, 2L oxygen is on, it's her baseline. NSR on tele. Last BM- 10/15/23, bowel sounds are active, abdomen is soft and non-tender, continent. All needs are met. Pt is going home today. Pt is updated with plan of care.      Problem: Diabetes/Glucose Control  Goal: Glucose maintained within prescribed range  Description: INTERVENTIONS:  - Monitor Blood Glucose as ordered  - Assess for signs and symptoms of hyperglycemia and hypoglycemia  - Administer ordered medications to maintain glucose within target range  - Assess barriers to adequate nutritional intake and initiate nutrition consult as needed  - Instruct patient on self management of diabetes  Outcome: Progressing     Problem: Patient/Family Goals  Goal: Patient/Family Long Term Goal  Description: Patient's Long Term Goal: Stay out of the hospital     Interventions:  - Follow up appointments  - Med compliance  - See additional Care Plan goals for specific interventions  Outcome: Progressing  Goal: Patient/Family Short Term Goal  Description: Patient's Short Term Goal: Feel better    Interventions:   - Med administration, diuresis   - See additional Care Plan goals for specific interventions  Outcome: Progressing

## 2023-10-15 NOTE — DISCHARGE SUMMARY
General Medicine Discharge Summary     Patient ID:  Vega Starr  46year old  4/20/1970    Admit date: 10/12/2023    Discharge date and time: 10/15/2023 12:00 PM     Attending Physician: No att. providers found     Consults: IP CONSULT TO NEPHROLOGY  IP CONSULT TO NEPHROLOGY  IP CONSULT TO HOSPITALIST  IP CONSULT TO SOCIAL WORK    Primary Care Physician: Wandy Hagan MD     Reason for admission: Hypercalcemia    Risk For Readmission: Low    Discharge Diagnoses: Hypercalcemia [E83.52]  See Additional Discharge Diagnoses in Hospital Course    Discharged Condition: stable    Follow-up with labs/images appointments: See discharge instructions    Exam  Gen: No acute distress  Pulm: Lungs clear, normal respiratory effort  CV: Heart with regular rate and rhythm  Abd: Abdomen soft,       HPI: Per chart    Hospital Course:   47 yo F with hx of invasive ductal carcinoma of breast, DM2, HTN, HL, CKD, anemia who was recently admitted for abnormal labs 2 weeks ago, now here with worsening hypercalcemia. Patient was seen and evaluated by nephrology. She was started on IV hydration and treatment for hypercalcemia which helped improve her calcium; her kidney function also improved and she was cleared for discharge with outpatient follow-up. Patient is recommended to follow-up with her oncology for ongoing malignancy work-up. Details below.      Hypercalcemia  - Ca 13.9 on admit-> 11.4 corrected at discharge  - elevated ionized calcium--> 1.76 --> 1.68  - low albumin--> 3.1 --> 2.7  - vit D ordered  - low intact PTH --> 6.3  - renal consulted, recommends IVF and calcitonin     Upper and lower extremity edema  - In the setting of IVF for hypercalcemia  - Plan for one-time dose of Lasix     JULIETA on CKD  - Cr 2.3 on admit--> 1.85-> 1.7 at discharge  - baseline Cr previously around 1.5 but discharged around 2.0 a few weeks ago; at previous admission she needed to be on HD transiently for kidney failure from ESBL UTI sepsis  - Renal following  - IVF     DM2 with neuropathy  - home regimen: hold home oral meds, levemir  - accuchecks QID, hypoglycemic protocol, SSI  - gabapentin     HTN  - BP elevated  - continue carvedilol and clonidine     HL  - statin     Breast CA  - hx of chemotherapy  - tamoxifen-     Gout  - allopurinol- hold     Chronic anemia  Thrombocytopenia  - Hemoglobin 8.5, platelets 424  - Thrombocytopenia appears to be ongoing since August  - Patient currently getting worked up for malignancy in the outpatient setting  - Recommend following up on CBC and pursue appropriate work-up as needed     Acute stress disorder  - patients   2 weeks ago, has his memorial next week, family coming into town this weekend  -Offered support  - Patient sees therapist regularly           Operative Procedures:      Imaging: No results found. Disposition: home    Activity: activity as tolerated  Diet: renal diet  Wound Care: none needed  Code Status: Full Code  O2: None    Home Medication Changes:     Med list     Medication List        CONTINUE taking these medications      * ALPRAZolam 0.5 MG Tabs  Commonly known as: Xanax  Take 1 tablet (0.5 mg total) by mouth daily as needed for Sleep. * ALPRAZolam 0.25 MG Tabs  Commonly known as: Xanax     BD Pen Needle Mini U/F 31G X 5 MM Misc  Generic drug: Insulin Pen Needle  USE WITH INSULIN TWICE DAILY     carvedilol 12.5 MG Tabs  Commonly known as: Coreg  Take 1 tablet (12.5 mg total) by mouth 2 (two) times daily with meals. Cetirizine HCl 10 MG Chew  Commonly known as: ZYRTEC     cloNIDine 0.1 MG Tabs  Commonly known as: Catapres  Take 1 tablet (0.1 mg total) by mouth in the morning, at noon, and at bedtime. Elmiron 100 MG Caps  Generic drug: pentosan polysulfate  TAKE 1 CAPSULE BY MOUTH 2 TIMES DAILY BEFORE MEALS. folic acid 1 MG Tabs  Commonly known as: Folvite  Take 1 tablet (1 mg total) by mouth daily.      FreeStyle Lancets Misc  1 each by Finger stick route daily.     FreeStyle Lite Test Strp  USE TO TEST BLOOD SUGAR ONE TIME DAILY AS DIRECTED     * FreeStyle System Kit  1 each by Does not apply route as needed for Other. * FreeStyle Freedom Lite w/Device Kit  Use as directed. Controlled type 2 diabetes mellitus without complication, with long-term current use of insulin ([facility] [address] E11.9  Notes to patient: Use as directed     gabapentin 300 MG Caps  Commonly known as: Neurontin  Take 2 capsules (600 mg total) by mouth 2 (two) times daily. Januvia 50 MG Tabs  Generic drug: SITagliptin Phosphate     Lantus SoloStar 100 UNIT/ML Sopn  Generic drug: insulin glargine     ondansetron 8 MG tablet  Commonly known as: Zofran     rosuvastatin 5 MG Tabs  Commonly known as: Crestor  Notes to patient: Monday and Thursdays     semaglutide 2 MG/3ML Sopn  Commonly known as: Ozempic  Notes to patient: Once a week     tamoxifen 20 MG Tabs  Commonly known as: Nolvadex  Take 1 tablet (20 mg total) by mouth daily. zolpidem 10 MG Tabs  Commonly known as: Ambien           * This list has 4 medication(s) that are the same as other medications prescribed for you. Read the directions carefully, and ask your doctor or other care provider to review them with you. STOP taking these medications      allopurinol 100 MG Tabs  Commonly known as: Zyloprim     allopurinol 300 MG Tabs  Commonly known as: Zyloprim              FU   Follow-up Information       Vu Webb MD Follow up in 1 month(s). Specialties: NEPHROLOGY, Internal Medicine  Contact information:  [address] [place] [address] Street               Amee Chandler MD Follow up in 1 week(s). Specialty: Internal Medicine  Contact information:  [place] [phone]                             [place] instructions:       Other Discharge Instructions:         Resume services with [facility]  [phone]    Hold Elmiron until calcium improves on outpatient labs. Discharge medications reviewed and reconciled.     Total Time Coordinating Care: Greater than 30 minutes    Patient had opportunity to ask questions and state understand and agree with therapeutic plan as outlined    Thank You,    Jessy Wolfe, [address] [facility]  Pager

## 2023-10-15 NOTE — PLAN OF CARE
Patient is alert & oriented x4. Pt ambulates well, SBA. Breath sounds are clear bilateral. On 2L NC baseline. Normal sinus rhythm. No pain reported. Skin dry and intact. BM overnight. Continuous IVF overnight. Bed in low position and call light within reach. Reviewed plan of care and patient verbalizes understanding.         Problem: Diabetes/Glucose Control  Goal: Glucose maintained within prescribed range  Description: INTERVENTIONS:  - Monitor Blood Glucose as ordered  - Assess for signs and symptoms of hyperglycemia and hypoglycemia  - Administer ordered medications to maintain glucose within target range  - Assess barriers to adequate nutritional intake and initiate nutrition consult as needed  - Instruct patient on self management of diabetes  Outcome: Progressing     Problem: Patient/Family Goals  Goal: Patient/Family Long Term Goal  Description: Patient's Long Term Goal: Stay out of the hospital     Interventions:  - Follow up appointments  - Med compliance  - See additional Care Plan goals for specific interventions  Outcome: Progressing  Goal: Patient/Family Short Term Goal  Description: Patient's Short Term Goal: Feel better    Interventions:   - Med administration, diuresis   - See additional Care Plan goals for specific interventions  Outcome: Progressing

## 2023-10-15 NOTE — PROGRESS NOTES
10/14/23 3922   Clinical Encounter Type   Visited With Patient   Routine Visit Follow-up   Continue Visiting Yes   Crisis Visit Critical care   Patient's Supportive Strategies/Resources Patient has a very supportive sister. Taoist Encounters   Taoist Needs Prayer   Family Spiritual Encounters   Family Participation in Care Consistently   Family Support During Treatment Consistently   Taxonomy   Intended Effects Demonstrate caring and concern   Methods Offer spiritual/Congregation support; Offer emotional support   Interventions Acknowledge current situation; Active listening; Ask guided questions;Provide compassionate touch;Prayer for healing;[place];Provide Grief Processing Session

## 2023-10-15 NOTE — PROGRESS NOTES
Pt discharged home. Tele removed, IV removed. F/U instructions provided and discussed. Pt and family verbalized understanding. Discussed adverse reactions and side effects of all medications and provided handout. Pt wheeled down by staff with belongins. Pt left denying any complains of pain, malaise, or cardiac symptoms. All needs met by staff.

## 2023-10-16 LAB
CA-I BLD-SCNC: 1.47 MMOL/L (ref 0.95–1.32)
VIT D 1,25 DI-OH: 69.8 PG/ML

## 2023-10-18 NOTE — PAYOR COMM NOTE
Discharge Notification    Patient Name: Andrés Ugalde  Payor: Tripp JADE  Subscriber #: [id number]  Authorization Number: [id number]  Admit Date/Time: 10/12/2023 5:11 PM  Discharge Date/Time: 10/15/2023 12:00 PM      PLEASE FAX DETERMINATION OF DAYS [phone]  Days 10/14-10/15

## 2023-10-20 ENCOUNTER — TELEPHONE (OUTPATIENT)
Facility: CLINIC | Age: 53
End: 2023-10-20

## 2023-10-20 LAB — PTH-RELATED PEPTIDE: <2 PMOL/L

## 2023-10-20 NOTE — TELEPHONE ENCOUNTER
Spoke to the nurse at Dr. Miranda Zavala office, stated they are waiting for the doctor to sign off on the clearance letter and will fax it back once completed

## 2023-10-20 NOTE — TELEPHONE ENCOUNTER
Call placed to Dr. Miranda Zavala office to check status of clearance. Message has been routed to Dr. Karina Parkinson and we should hear something by end of day.

## 2023-10-23 ENCOUNTER — TELEPHONE (OUTPATIENT)
Facility: CLINIC | Age: 53
End: 2023-10-23

## 2023-10-23 NOTE — TELEPHONE ENCOUNTER
Did not receive clearance from Dr. Spain office on Friday as expected. Another call place to the office. Held for 15 minutes and was disconnected. Called back and disconnected again. Spoke with reception and message will be given to nurse. Received a return call from Umu at Dr. Spain office. Dr. Lesa Gatica is working with cardiologist Dr. Rhonda Orellana. She needs to be cleared by cardiology first then Dr. Lesa Gatica will complete clearance. They are hoping her to get into Dr. Rahul Snell office this week.

## 2023-10-25 ENCOUNTER — PATIENT MESSAGE (OUTPATIENT)
Facility: CLINIC | Age: 53
End: 2023-10-25

## 2023-10-26 NOTE — TELEPHONE ENCOUNTER
From: Lamonte Velarde  To: Juhi Restrepo  Sent: 10/25/2023 10:26 AM CDT  Subject: Question     Hi Dr. Mena Gilmore, This note is from Dr. Jimenez Narayan at [facility].  ---------------------------------------------  Results reviewed. Patient has elevated active vitamin D levels. We discussed this also when she was in the hospital.  She will need to follow up with her pulmonologist regarding the lymph nodes to determine whether this elevated level is related. Please let me know if you have any questions or concerns.     Sincerely,    Sarah Russo MD  Nephrology  10/16/23  9:27 AM    --------------------------  Thank you, Luiz Ames

## 2023-10-27 NOTE — TELEPHONE ENCOUNTER
Per Dr. Kristen Anderson:    Kristine Prakash planning on bronchoscopy, awaiting her cardiac clearance. Pt notified of above via message left on VM.

## 2023-10-27 NOTE — TELEPHONE ENCOUNTER
Left message for patient to call back to let us know if she made a cardiologist appoinment to get clearance for procedure    Spoke with patient. Saw Dr. Mario Cameron at [facility] for clearance.   Clearance letter faxed to his office

## 2023-10-31 ENCOUNTER — PATIENT MESSAGE (OUTPATIENT)
Facility: CLINIC | Age: 53
End: 2023-10-31

## 2023-10-31 DIAGNOSIS — R59.0 THORACIC LYMPHADENOPATHY: Primary | ICD-10-CM

## 2023-10-31 NOTE — TELEPHONE ENCOUNTER
Received cardiac clearance from Dr. Nemesio Dillon. We have not received okay/clearance about which diabetic meds to hold/adjust.  Call placed to Dr. Chad Keene office. RN will bring to Dr. Magdalena Jordan attention. Clearance request re-faxed to Dr. Alberta Barthel per [facility]. Received fax from Dr. Magdalena Jordan office. Pt to hold ozempic for 1 week prior to procedure. Pt advised to cut lantus in 1/2 before . Other meds are fine. Pt states that Dr. Magdalena Jordan office has contacted her regarding her medications. Pt's current insurance coverage terminates tonight at 1159pm but she will have cobra starting tomorrow. Pt will send us copy of card via [facility].

## 2023-11-01 ENCOUNTER — TELEPHONE (OUTPATIENT)
Facility: CLINIC | Age: 53
End: 2023-11-01

## 2023-11-01 RX ORDER — MAGNESIUM 200 MG
400 TABLET ORAL 2 TIMES DAILY
COMMUNITY

## 2023-11-01 RX ORDER — ALLOPURINOL 100 MG/1
50 TABLET ORAL EVERY OTHER DAY
COMMUNITY

## 2023-11-10 ENCOUNTER — LAB ENCOUNTER (OUTPATIENT)
Dept: LAB | Age: 53
End: 2023-11-10
Attending: INTERNAL MEDICINE
Payer: COMMERCIAL

## 2023-11-10 DIAGNOSIS — E78.00 HYPERCHOLESTEREMIA: ICD-10-CM

## 2023-11-10 DIAGNOSIS — N39.0 SEPSIS SECONDARY TO UTI: ICD-10-CM

## 2023-11-10 DIAGNOSIS — I50.23 ACUTE ON CHRONIC SYSTOLIC CONGESTIVE HEART FAILURE (HCC): ICD-10-CM

## 2023-11-10 DIAGNOSIS — E11.9 CONTROLLED TYPE 2 DIABETES MELLITUS WITHOUT COMPLICATION, WITH LONG-TERM CURRENT USE OF INSULIN (HCC): ICD-10-CM

## 2023-11-10 DIAGNOSIS — R73.9 HYPERGLYCEMIA: ICD-10-CM

## 2023-11-10 DIAGNOSIS — A41.9 SEPSIS SECONDARY TO UTI: ICD-10-CM

## 2023-11-10 DIAGNOSIS — E83.42 HYPOMAGNESEMIA: Primary | ICD-10-CM

## 2023-11-10 DIAGNOSIS — N18.32 STAGE 3B CHRONIC KIDNEY DISEASE (HCC): ICD-10-CM

## 2023-11-10 DIAGNOSIS — K74.60 CIRRHOSIS (HCC): ICD-10-CM

## 2023-11-10 DIAGNOSIS — D69.6 THROMBOCYTOPENIA (HCC): ICD-10-CM

## 2023-11-10 DIAGNOSIS — I10 ESSENTIAL HYPERTENSION WITH GOAL BLOOD PRESSURE LESS THAN 130/80: ICD-10-CM

## 2023-11-10 DIAGNOSIS — R59.9 ADENOPATHY: ICD-10-CM

## 2023-11-10 DIAGNOSIS — D64.9 ANEMIA: ICD-10-CM

## 2023-11-10 DIAGNOSIS — R09.02 HYPOXIA: ICD-10-CM

## 2023-11-10 DIAGNOSIS — D61.818 PANCYTOPENIA (HCC): ICD-10-CM

## 2023-11-10 DIAGNOSIS — Z79.4 CONTROLLED TYPE 2 DIABETES MELLITUS WITHOUT COMPLICATION, WITH LONG-TERM CURRENT USE OF INSULIN (HCC): ICD-10-CM

## 2023-11-10 LAB
ALBUMIN SERPL-MCNC: 3.3 G/DL (ref 3.4–5)
ALBUMIN/GLOB SERPL: 0.6 {RATIO} (ref 1–2)
ALP LIVER SERPL-CCNC: 52 U/L
ALT SERPL-CCNC: 14 U/L
ANION GAP SERPL CALC-SCNC: 3 MMOL/L (ref 0–18)
AST SERPL-CCNC: 31 U/L (ref 15–37)
BASOPHILS # BLD AUTO: 0.02 X10(3) UL (ref 0–0.2)
BASOPHILS NFR BLD AUTO: 0.6 %
BILIRUB SERPL-MCNC: 0.6 MG/DL (ref 0.1–2)
BUN BLD-MCNC: 16 MG/DL (ref 9–23)
CALCIUM BLD-MCNC: 12.9 MG/DL (ref 8.5–10.1)
CHLORIDE SERPL-SCNC: 100 MMOL/L (ref 98–112)
CO2 SERPL-SCNC: 33 MMOL/L (ref 21–32)
CREAT BLD-MCNC: 1.83 MG/DL
EGFRCR SERPLBLD CKD-EPI 2021: 32 ML/MIN/1.73M2 (ref 60–?)
EOSINOPHIL # BLD AUTO: 0.16 X10(3) UL (ref 0–0.7)
EOSINOPHIL NFR BLD AUTO: 4.5 %
ERYTHROCYTE [DISTWIDTH] IN BLOOD BY AUTOMATED COUNT: 13.1 %
FASTING STATUS PATIENT QL REPORTED: NO
GLOBULIN PLAS-MCNC: 5.8 G/DL (ref 2.8–4.4)
GLUCOSE BLD-MCNC: 128 MG/DL (ref 70–99)
HCT VFR BLD AUTO: 33.5 %
HGB BLD-MCNC: 10.4 G/DL
IMM GRANULOCYTES # BLD AUTO: 0.01 X10(3) UL (ref 0–1)
IMM GRANULOCYTES NFR BLD: 0.3 %
LYMPHOCYTES # BLD AUTO: 0.74 X10(3) UL (ref 1–4)
LYMPHOCYTES NFR BLD AUTO: 20.8 %
MAGNESIUM SERPL-MCNC: 1.7 MG/DL (ref 1.6–2.6)
MCH RBC QN AUTO: 31 PG (ref 26–34)
MCHC RBC AUTO-ENTMCNC: 31 G/DL (ref 31–37)
MCV RBC AUTO: 100 FL
MONOCYTES # BLD AUTO: 0.57 X10(3) UL (ref 0.1–1)
MONOCYTES NFR BLD AUTO: 16.1 %
NEUTROPHILS # BLD AUTO: 2.05 X10 (3) UL (ref 1.5–7.7)
NEUTROPHILS # BLD AUTO: 2.05 X10(3) UL (ref 1.5–7.7)
NEUTROPHILS NFR BLD AUTO: 57.7 %
OSMOLALITY SERPL CALC.SUM OF ELEC: 285 MOSM/KG (ref 275–295)
PLATELET # BLD AUTO: 139 10(3)UL (ref 150–450)
POTASSIUM SERPL-SCNC: 4 MMOL/L (ref 3.5–5.1)
PROT SERPL-MCNC: 9.1 G/DL (ref 6.4–8.2)
RBC # BLD AUTO: 3.35 X10(6)UL
SODIUM SERPL-SCNC: 136 MMOL/L (ref 136–145)
WBC # BLD AUTO: 3.6 X10(3) UL (ref 4–11)

## 2023-11-10 PROCEDURE — 85025 COMPLETE CBC W/AUTO DIFF WBC: CPT

## 2023-11-10 PROCEDURE — 83735 ASSAY OF MAGNESIUM: CPT

## 2023-11-10 PROCEDURE — 36415 COLL VENOUS BLD VENIPUNCTURE: CPT

## 2023-11-10 PROCEDURE — 80053 COMPREHEN METABOLIC PANEL: CPT

## 2023-11-14 ENCOUNTER — TELEPHONE (OUTPATIENT)
Facility: CLINIC | Age: 53
End: 2023-11-14

## 2023-11-14 ENCOUNTER — ANESTHESIA EVENT (OUTPATIENT)
Dept: ENDOSCOPY | Facility: HOSPITAL | Age: 53
End: 2023-11-14
Payer: COMMERCIAL

## 2023-11-16 ENCOUNTER — MED REC SCAN ONLY (OUTPATIENT)
Facility: CLINIC | Age: 53
End: 2023-11-16

## 2023-11-17 ENCOUNTER — ANESTHESIA (OUTPATIENT)
Dept: ENDOSCOPY | Facility: HOSPITAL | Age: 53
End: 2023-11-17
Payer: COMMERCIAL

## 2023-11-17 ENCOUNTER — HOSPITAL ENCOUNTER (OUTPATIENT)
Facility: HOSPITAL | Age: 53
Setting detail: HOSPITAL OUTPATIENT SURGERY
Discharge: HOME | End: 2023-11-17
Attending: INTERNAL MEDICINE | Admitting: INTERNAL MEDICINE
Payer: COMMERCIAL

## 2023-11-17 VITALS
SYSTOLIC BLOOD PRESSURE: 179 MMHG | HEART RATE: 84 BPM | DIASTOLIC BLOOD PRESSURE: 92 MMHG | WEIGHT: 293 LBS | TEMPERATURE: 98 F | OXYGEN SATURATION: 95 % | BODY MASS INDEX: 50.02 KG/M2 | RESPIRATION RATE: 20 BRPM | HEIGHT: 64 IN

## 2023-11-17 DIAGNOSIS — N18.32 STAGE 3B CHRONIC KIDNEY DISEASE (HCC): ICD-10-CM

## 2023-11-17 DIAGNOSIS — R73.9 HYPERGLYCEMIA: ICD-10-CM

## 2023-11-17 DIAGNOSIS — R59.0 THORACIC LYMPHADENOPATHY: ICD-10-CM

## 2023-11-17 DIAGNOSIS — N39.0 SEPSIS SECONDARY TO UTI: ICD-10-CM

## 2023-11-17 DIAGNOSIS — D64.9 ANEMIA: ICD-10-CM

## 2023-11-17 DIAGNOSIS — A41.9 SEPSIS SECONDARY TO UTI: ICD-10-CM

## 2023-11-17 DIAGNOSIS — R59.9 ADENOPATHY: ICD-10-CM

## 2023-11-17 DIAGNOSIS — K74.60 CIRRHOSIS (HCC): Primary | ICD-10-CM

## 2023-11-17 DIAGNOSIS — I50.23 ACUTE ON CHRONIC SYSTOLIC CONGESTIVE HEART FAILURE (HCC): ICD-10-CM

## 2023-11-17 DIAGNOSIS — I10 ESSENTIAL HYPERTENSION WITH GOAL BLOOD PRESSURE LESS THAN 130/80: ICD-10-CM

## 2023-11-17 DIAGNOSIS — R09.02 HYPOXIA: ICD-10-CM

## 2023-11-17 DIAGNOSIS — E78.00 HYPERCHOLESTEREMIA: ICD-10-CM

## 2023-11-17 DIAGNOSIS — D69.6 THROMBOCYTOPENIA (HCC): ICD-10-CM

## 2023-11-17 DIAGNOSIS — E11.9 CONTROLLED TYPE 2 DIABETES MELLITUS WITHOUT COMPLICATION, WITH LONG-TERM CURRENT USE OF INSULIN (HCC): ICD-10-CM

## 2023-11-17 DIAGNOSIS — D61.818 PANCYTOPENIA (HCC): ICD-10-CM

## 2023-11-17 DIAGNOSIS — Z79.4 CONTROLLED TYPE 2 DIABETES MELLITUS WITHOUT COMPLICATION, WITH LONG-TERM CURRENT USE OF INSULIN (HCC): ICD-10-CM

## 2023-11-17 LAB
BASOPHILS NFR BRONCH: 0 %
COLOR FLD: COLORLESS
EOSINOPHIL NFR BRONCH: 0 %
GLUCOSE BLD-MCNC: 141 MG/DL (ref 70–99)
GLUCOSE BLD-MCNC: 178 MG/DL (ref 70–99)
LYMPHOCYTES NFR BRONCH: 15 %
MONOS+MACROS NFR BRONCH: 85 %
NEUTROPHILS NFR BRONCH: 0 %
TOTAL CELLS COUNTED FLD: 62
TURBIDITY CSF QL: CLEAR

## 2023-11-17 PROCEDURE — 31653 BRONCH EBUS SAMPLNG 3/> NODE: CPT | Performed by: INTERNAL MEDICINE

## 2023-11-17 PROCEDURE — 07D78ZX EXTRACTION OF THORAX LYMPHATIC, VIA NATURAL OR ARTIFICIAL OPENING ENDOSCOPIC, DIAGNOSTIC: ICD-10-PCS | Performed by: INTERNAL MEDICINE

## 2023-11-17 PROCEDURE — 31624 DX BRONCHOSCOPE/LAVAGE: CPT | Performed by: INTERNAL MEDICINE

## 2023-11-17 PROCEDURE — 0B9D8ZX DRAINAGE OF RIGHT MIDDLE LUNG LOBE, VIA NATURAL OR ARTIFICIAL OPENING ENDOSCOPIC, DIAGNOSTIC: ICD-10-PCS | Performed by: INTERNAL MEDICINE

## 2023-11-17 RX ORDER — NICOTINE POLACRILEX 4 MG
30 LOZENGE BUCCAL
Status: DISCONTINUED | OUTPATIENT
Start: 2023-11-17 | End: 2023-11-17 | Stop reason: HOSPADM

## 2023-11-17 RX ORDER — HYDROMORPHONE HYDROCHLORIDE 1 MG/ML
0.2 INJECTION, SOLUTION INTRAMUSCULAR; INTRAVENOUS; SUBCUTANEOUS EVERY 5 MIN PRN
Status: DISCONTINUED | OUTPATIENT
Start: 2023-11-17 | End: 2023-11-17 | Stop reason: HOSPADM

## 2023-11-17 RX ORDER — NICOTINE POLACRILEX 4 MG
15 LOZENGE BUCCAL
Status: DISCONTINUED | OUTPATIENT
Start: 2023-11-17 | End: 2023-11-17 | Stop reason: HOSPADM

## 2023-11-17 RX ORDER — ALBUTEROL SULFATE 2.5 MG/3ML
2.5 SOLUTION RESPIRATORY (INHALATION) AS NEEDED
Status: DISCONTINUED | OUTPATIENT
Start: 2023-11-17 | End: 2023-11-17 | Stop reason: HOSPADM

## 2023-11-17 RX ORDER — NALOXONE HYDROCHLORIDE 0.4 MG/ML
0.08 INJECTION, SOLUTION INTRAMUSCULAR; INTRAVENOUS; SUBCUTANEOUS AS NEEDED
Status: DISCONTINUED | OUTPATIENT
Start: 2023-11-17 | End: 2023-11-17 | Stop reason: HOSPADM

## 2023-11-17 RX ORDER — PROCHLORPERAZINE EDISYLATE 5 MG/ML
5 INJECTION INTRAMUSCULAR; INTRAVENOUS EVERY 8 HOURS PRN
Status: DISCONTINUED | OUTPATIENT
Start: 2023-11-17 | End: 2023-11-17 | Stop reason: HOSPADM

## 2023-11-17 RX ORDER — HYDROCODONE BITARTRATE AND ACETAMINOPHEN 5; 325 MG/1; MG/1
1 TABLET ORAL ONCE AS NEEDED
Status: DISCONTINUED | OUTPATIENT
Start: 2023-11-17 | End: 2023-11-17 | Stop reason: HOSPADM

## 2023-11-17 RX ORDER — DIPHENHYDRAMINE HYDROCHLORIDE 50 MG/ML
INJECTION INTRAMUSCULAR; INTRAVENOUS AS NEEDED
Status: DISCONTINUED | OUTPATIENT
Start: 2023-11-17 | End: 2023-11-17 | Stop reason: SURG

## 2023-11-17 RX ORDER — ONDANSETRON 2 MG/ML
INJECTION INTRAMUSCULAR; INTRAVENOUS AS NEEDED
Status: DISCONTINUED | OUTPATIENT
Start: 2023-11-17 | End: 2023-11-17 | Stop reason: SURG

## 2023-11-17 RX ORDER — ACETAMINOPHEN 500 MG
1000 TABLET ORAL ONCE AS NEEDED
Status: DISCONTINUED | OUTPATIENT
Start: 2023-11-17 | End: 2023-11-17 | Stop reason: HOSPADM

## 2023-11-17 RX ORDER — ACETAMINOPHEN 500 MG
500 TABLET ORAL EVERY 6 HOURS PRN
COMMUNITY

## 2023-11-17 RX ORDER — DEXTROSE MONOHYDRATE 25 G/50ML
50 INJECTION, SOLUTION INTRAVENOUS
Status: DISCONTINUED | OUTPATIENT
Start: 2023-11-17 | End: 2023-11-17 | Stop reason: HOSPADM

## 2023-11-17 RX ORDER — HYDROMORPHONE HYDROCHLORIDE 1 MG/ML
0.6 INJECTION, SOLUTION INTRAMUSCULAR; INTRAVENOUS; SUBCUTANEOUS EVERY 5 MIN PRN
Status: DISCONTINUED | OUTPATIENT
Start: 2023-11-17 | End: 2023-11-17 | Stop reason: HOSPADM

## 2023-11-17 RX ORDER — HYDROCODONE BITARTRATE AND ACETAMINOPHEN 5; 325 MG/1; MG/1
2 TABLET ORAL ONCE AS NEEDED
Status: DISCONTINUED | OUTPATIENT
Start: 2023-11-17 | End: 2023-11-17 | Stop reason: HOSPADM

## 2023-11-17 RX ORDER — LIDOCAINE HYDROCHLORIDE 10 MG/ML
INJECTION, SOLUTION EPIDURAL; INFILTRATION; INTRACAUDAL; PERINEURAL AS NEEDED
Status: DISCONTINUED | OUTPATIENT
Start: 2023-11-17 | End: 2023-11-17 | Stop reason: SURG

## 2023-11-17 RX ORDER — SODIUM CHLORIDE, SODIUM LACTATE, POTASSIUM CHLORIDE, CALCIUM CHLORIDE 600; 310; 30; 20 MG/100ML; MG/100ML; MG/100ML; MG/100ML
INJECTION, SOLUTION INTRAVENOUS CONTINUOUS
Status: DISCONTINUED | OUTPATIENT
Start: 2023-11-17 | End: 2023-11-17

## 2023-11-17 RX ORDER — SODIUM CHLORIDE, SODIUM LACTATE, POTASSIUM CHLORIDE, CALCIUM CHLORIDE 600; 310; 30; 20 MG/100ML; MG/100ML; MG/100ML; MG/100ML
INJECTION, SOLUTION INTRAVENOUS CONTINUOUS
Status: DISCONTINUED | OUTPATIENT
Start: 2023-11-17 | End: 2023-11-17 | Stop reason: HOSPADM

## 2023-11-17 RX ORDER — DEXAMETHASONE SODIUM PHOSPHATE 4 MG/ML
VIAL (ML) INJECTION AS NEEDED
Status: DISCONTINUED | OUTPATIENT
Start: 2023-11-17 | End: 2023-11-17 | Stop reason: SURG

## 2023-11-17 RX ORDER — ONDANSETRON 2 MG/ML
4 INJECTION INTRAMUSCULAR; INTRAVENOUS EVERY 6 HOURS PRN
Status: DISCONTINUED | OUTPATIENT
Start: 2023-11-17 | End: 2023-11-17 | Stop reason: HOSPADM

## 2023-11-17 RX ORDER — HYDROMORPHONE HYDROCHLORIDE 1 MG/ML
0.4 INJECTION, SOLUTION INTRAMUSCULAR; INTRAVENOUS; SUBCUTANEOUS EVERY 5 MIN PRN
Status: DISCONTINUED | OUTPATIENT
Start: 2023-11-17 | End: 2023-11-17 | Stop reason: HOSPADM

## 2023-11-17 RX ADMIN — SODIUM CHLORIDE, SODIUM LACTATE, POTASSIUM CHLORIDE, CALCIUM CHLORIDE: 600; 310; 30; 20 INJECTION, SOLUTION INTRAVENOUS at 15:21:00

## 2023-11-17 RX ADMIN — ONDANSETRON 4 MG: 2 INJECTION INTRAMUSCULAR; INTRAVENOUS at 14:58:00

## 2023-11-17 RX ADMIN — DIPHENHYDRAMINE HYDROCHLORIDE 12.5 MG: 50 INJECTION INTRAMUSCULAR; INTRAVENOUS at 14:27:00

## 2023-11-17 RX ADMIN — DEXAMETHASONE SODIUM PHOSPHATE 4 MG: 4 MG/ML VIAL (ML) INJECTION at 14:27:00

## 2023-11-17 RX ADMIN — LIDOCAINE HYDROCHLORIDE 50 MG: 10 INJECTION, SOLUTION EPIDURAL; INFILTRATION; INTRACAUDAL; PERINEURAL at 14:24:00

## 2023-11-17 NOTE — DISCHARGE SUMMARY
Outpatient Surgery Brief Discharge Summary         Patient ID:  Joselyn Soulier  [id number]  46year old  4/20/1970    Discharge Diagnoses: Thoracic lymphadenopathy [R59.0]    Procedures: bronchoscopy    Discharged Condition: stable    Disposition: home    Patient Instructions: Follow-up with Rosa Iqbal MD in 1-2 weeks.     Diet: regular diet  Activity: as tolerated    Rosa Iqbal MD  11/17/2023  3:12 PM

## 2023-11-17 NOTE — OPERATIVE REPORT
[facility] Patient Status:  [facility] Surgery    1970 MRN [id number]   Location [id number] [place] [address] Attending Nancy Meyer MD   Hosp Day # 0 PCP Ashleigh Sin MD     OPERATIVE REPORT:     DATE OF OPERATION: 23      PREOPERATIVE DIAGNOSIS(ES): thoracic lymphadenopathy     POSTOPERATIVE DIAGNOSIS(ES): same     OPERATION(S) PERFORMED: Bronchoscopy with endobronchial ultrasound-guided transbronchial needle aspiration of left interlobar, subcarinal and right interlobar lymphadenopathy     SURGEON: Liz Barrientos MD    ANESTHESIA: General. Please see separate flow sheet. EQUIPMENT:   Olympus 7.5 MHz endobronchial ultrasound bronchoscope with dedicated 22-gauge ViziShot needle. Olympus adult videobronchoscope. INDICATION: The patient is a 46year old, who had hypercalcemia and on workup revealed FDG avid thoracic lymphadenopathy of unclear etiology. The procedure is being undertaken for diagnostic purposes and mediastinal staging. Differential diagnosis, risks, benefits and alternatives were discussed at length. Alternatives such as mediastinoscopy and conventional transbronchial needle biopsy were discussed. Endobronchial ultrasound guided transbronchial needle aspiration is  superior to blind transbronchial needle aspiration and is the recommended approach for this indication. CONSENT:  Risks and benefits were reviewed with the patient in detail regarding the procedure as well as anesthesia prior to the procedure. All questions were answered, and the patient was agreeable. PROCEDURE:  After informed consent was obtained, the patient was brought to the bronchoscopy suite. Time out performed. Patient was placed in a supine position, anesthesia administered, and topical lidocaine given for local anesthesia. First, the ultrasound videobronchoscope was used and inserted via laryngeal mask airway.  The vocal cords were insufficiently evaluated due to the level of sedation but appeared normal and approximated well. The bronchoscope was then advanced into the trachea. Evaluation of the trachea and main stem airways did not reveal evidence of endobronchial lesion to the segmental levels bilaterally. Ultrasound examination revealed a 10 mm lymph node in the 11L left interlobar location, no lymph node in the 10L left hilar location, a 20 mm lymph node in the 7 subcarinal location, a collection of 5-7 mm lymph nodes in the 4L left paratracheal location, two adjacent 8-10 mm lymph nodes in the 4R right paratracheal location, no lymph node in the 10R right hilar location, and a 20 mm lymph node in the 11R right interlobar location. 9 passes were taken at the 11L left interlobar location, with 2 passes given to the cytotechnician/cytopathologist for screening, 1 pass placed into saline for microbiological studies and the others placed into saline for processing. 8 passes were taken at the 7 subcarinal location, with 1 passes given to the cytotechnician/cytopathologist for screening, 1 pass placed into saline for microbiological studies and the others placed into saline for processing. 7 passes were taken at the 11R right interlobar location, with 1 passes given to the cytotechnician/cytopathologist for screening, 1 pass placed into saline for microbiological studies and the others placed into saline for processing. Next, the ultrasound bronchoscope was withdrawn and the conventional videobronchoscope was introduced into the airway. There was no evidence of endobronchial disease visualized to the subsegmental level bilaterally. A bronchoalveolar lavage was then performed in the right middle lobe medial segment with the instillation of 100ml sterile saline and return of 30 ml which was sent for microbiological studies. Hemostasis was observed and the bronchoscope was then withdrawn.   The patient tolerated the procedure well without immediate complications. EBL:  <5ml     IMPRESSION:   Thoracic lymphadenopathy     PLAN:   await results of bronchoscopy for further delineation of care.       Chivo Phillips MD

## 2023-11-17 NOTE — ANESTHESIA PROCEDURE NOTES
Airway  Date/Time: 11/17/2023 2:25 PM  Urgency: elective      General Information and Staff    Patient location during procedure: OR  Anesthesiologist: Bubba Hook MD  Performed: anesthesiologist   Performed by: Bubba Hook MD  Authorized by: Bubba Hook MD      Indications and Patient Condition  Indications for airway management: anesthesia  Sedation level: deep  Preoxygenated: yes  Patient position: sniffing  Mask difficulty assessment: 0 - not attempted    Final Airway Details  Final airway type: supraglottic airway      Successful airway: classic  Size 4       Number of attempts at approach: 1

## 2023-11-20 ENCOUNTER — TELEPHONE (OUTPATIENT)
Facility: CLINIC | Age: 53
End: 2023-11-20

## 2023-11-20 NOTE — TELEPHONE ENCOUNTER
Pt called to follow up post bronch done on 11/17. Pt reports had a cough post bronch. States cough is better, not coughing as nearly as much as she was on Friday. States she was coughing up little phlegm, less than 1 tsp and clear in color. Denies dyspnea and fever. Verified post procedure follow up appt with [facility] APRN. Stated the nurse practitioner will go over results if available next Monday. Pt advised to call our office if develops new symptoms. Pt verbalized understanding.

## 2023-11-22 LAB — NON GYNE INTERPRETATION: NEGATIVE

## 2023-11-27 ENCOUNTER — OFFICE VISIT (OUTPATIENT)
Facility: CLINIC | Age: 53
End: 2023-11-27
Payer: COMMERCIAL

## 2023-11-27 VITALS
RESPIRATION RATE: 16 BRPM | BODY MASS INDEX: 55 KG/M2 | DIASTOLIC BLOOD PRESSURE: 80 MMHG | OXYGEN SATURATION: 98 % | HEART RATE: 78 BPM | HEIGHT: 64 IN | SYSTOLIC BLOOD PRESSURE: 132 MMHG

## 2023-11-27 DIAGNOSIS — J96.11 CHRONIC RESPIRATORY FAILURE WITH HYPOXIA (HCC): Primary | ICD-10-CM

## 2023-11-27 DIAGNOSIS — R59.0 THORACIC LYMPHADENOPATHY: ICD-10-CM

## 2023-11-27 PROCEDURE — 99214 OFFICE O/P EST MOD 30 MIN: CPT | Performed by: NURSE PRACTITIONER

## 2023-11-27 PROCEDURE — 3079F DIAST BP 80-89 MM HG: CPT | Performed by: NURSE PRACTITIONER

## 2023-11-27 PROCEDURE — 3075F SYST BP GE 130 - 139MM HG: CPT | Performed by: NURSE PRACTITIONER

## 2023-11-27 RX ORDER — SEMAGLUTIDE 1.34 MG/ML
INJECTION, SOLUTION SUBCUTANEOUS
COMMUNITY
Start: 2023-10-25

## 2023-11-27 RX ORDER — SITAGLIPTIN 25 MG/1
25 TABLET, FILM COATED ORAL DAILY
COMMUNITY
Start: 2023-10-31

## 2023-11-27 RX ORDER — FUROSEMIDE 20 MG/1
20 TABLET ORAL 2 TIMES DAILY
COMMUNITY
Start: 2023-10-25

## 2023-11-27 NOTE — PATIENT INSTRUCTIONS
Recommendation to see rheumatologist  May see sarcoidosis specialist Juan José Gibson out of [place]  Followup in 6 weeks  Please attempt to get PFTs (call central scheduling for appt) and 6 minute walk test (call our office for appt)  prior to next visit  Please contact office at [phone] with any decline in respiratory status, questions or concerns

## 2023-12-13 ENCOUNTER — OFFICE VISIT (OUTPATIENT)
Dept: SURGERY | Facility: CLINIC | Age: 53
End: 2023-12-13

## 2023-12-13 DIAGNOSIS — B96.20 E. COLI UTI: Primary | ICD-10-CM

## 2023-12-13 DIAGNOSIS — N30.10 INTERSTITIAL CYSTITIS: ICD-10-CM

## 2023-12-13 DIAGNOSIS — N39.0 E. COLI UTI: Primary | ICD-10-CM

## 2023-12-13 PROCEDURE — 99214 OFFICE O/P EST MOD 30 MIN: CPT | Performed by: PHYSICIAN ASSISTANT

## 2023-12-13 RX ORDER — PENTOSAN POLYSULFATE SODIUM 100 MG/1
100 CAPSULE, GELATIN COATED ORAL
Qty: 270 CAPSULE | Refills: 3 | Status: SHIPPED | OUTPATIENT
Start: 2023-12-13 | End: 2024-03-12

## 2024-01-08 ENCOUNTER — TELEMEDICINE (OUTPATIENT)
Facility: CLINIC | Age: 54
End: 2024-01-08
Payer: COMMERCIAL

## 2024-01-08 DIAGNOSIS — J96.11 CHRONIC RESPIRATORY FAILURE WITH HYPOXIA (HCC): ICD-10-CM

## 2024-01-08 DIAGNOSIS — R59.0 THORACIC LYMPHADENOPATHY: Primary | ICD-10-CM

## 2024-01-08 PROCEDURE — 99214 OFFICE O/P EST MOD 30 MIN: CPT | Performed by: NURSE PRACTITIONER

## 2024-01-08 NOTE — PROGRESS NOTES
[facility] General Pulmonary Progress Note    History of Present Illness:  Anjali Cruz is a 53 year old female with significant PMH sarcoidosis who presents today for follow up to discuss treatment for hypercalcemia/sarcoidosis. Seen  by PCP; recommendations to go to rheumatologist in place of Dr Quiroz at [place] at this time. Reports her A1c is 5.8 and told its ok to start treatment/steroids per PCP. Received Prolia from hem/onc. Has not had opthamalogist appt due to testing + Shingles head,neck and ears that lesions have crusted over; still with pain.    Previously 11/2023  a 53 year old female who presents for bronchoscopy follow - up and discuss results.   Denies fever or hemoptysis post procedure. Had a slight cough with clear phlegm which has resolved. Denies SOB; using oxygen 1.5L with activity; ok on RA at rest.   Pts  recently passed away.     Previously 10/2023 Dr Us   a 53 year old female never smoker with significant PMH of breast cancer 2014, DM,HTN, obesity who presents today for evaluation of thoracic lymphadenopathy. She was hospitalized with sepsis in August 2023 and discharged home on oxygen. She was later hospitalized in September with hypercalcemia of undetermined cause. She underwent PET/CT which showed FDG uptake in mediastinal and hilar LAD leading to her evaluation here.   She denies acute concerns today.  Has chronic BONILLA which is stable.  No cough/phlegm. No f/c/s.   Using 2L o2 on exertion.     Past Medical History:   Past Medical History:   Diagnosis Date    JULIETA (acute kidney injury) (HCC)     Anemia of chronic disease     Anesthesia complication     slow to awaken    Anxiety state     Breast cancer (HCC) 1/14'    invasive ductal carcinoma    Calculus of kidney     DEPRESSION     weaned off lexapro( was previously also on CYmbalta) 2012--seeing therapist    Depression     DIABETES     diagnosed 2010-    Diabetes (HCC)     Diabetes mellitus (HCC)     on meds    Elevated serum  creatinine     Hold ACE-I during Chemo---seeing Dr. Parker    Exposure to radiation     last radiation treatment:9/2014    High blood pressure     High cholesterol     Hx of motion sickness     Hypercalcemia     HYPERLIPIDEMIA     tricor 145 mg was about the same as Tricor 48 mg    HYPERTENSION     Kidney disease     chronic renal disease[place] [place]    [place]IDNEY STONE     seeing Dr. Parker--on allopurinol--uric acid kidney stones----Dr. Garber--[facility]    Lymphedema     right arm    Lymphedema     Migraines     Morbid obesity (HCC) 06/08/2011    Neuropathy     s/p Chemo---in feet    Personal history of antineoplastic chemotherapy     last chemo:7/2014    Shortness of breath     Thoracic lymphadenopathy     Visual impairment     glasses        Past Surgical History:   Past Surgical History:   Procedure Laterality Date    CHEMOTHERAPY      CHOLECYSTECTOMY  2009    CYSTOSCOPY,URETEROSCOPY,LITHOTRIPSY Right 10/10/16    R URS, laser litho, stone extrax, stent, Dr. Buckley    LITHOTRIPSY Right last was around 2008    lithotripsy--x5, Dr. Garber ([facility])    LUMPECTOMY RIGHT Right 2/20/14 ([facility])    Dr.Amrit Marin    OOPHORECTOMY Left     L-oophorectomy--10/16/12--large cyst    OTHER SURGICAL HISTORY  10/18/16    Cysto Stent removal Dr. Buckley    RADIATION RIGHT           Family Medical History:   Family History   Problem Relation Age of Onset    Other (Other) Father         Lupus-53    Diabetes Mother     Hypertension Mother     Other (Other) Sister         Depression    Cancer Paternal Grandmother         thyroid cancer    Cancer Maternal Aunt         breast cancer    Breast Cancer Maternal Aunt 60    Breast Cancer Paternal Aunt 57    Cancer Other         esophageal ca    Breast Cancer Self 43        invasive ductal carcinoma        Social History:   Social History     Socioeconomic History    Marital status:      Spouse name: Not on file    Number of children: Not on file    Years of  education: Not on file    Highest education level: Not on file   Occupational History    Not on file   Tobacco Use    Smoking status: Never     Passive exposure: Never    Smokeless tobacco: Never   Vaping Use    Vaping Use: Never used   Substance and Sexual Activity    Alcohol use: Not Currently     Comment: occasionally    Drug use: No    Sexual activity: Not on file   Other Topics Concern    Not on file   Social History Narrative    , no children, works in [employer]     Social Determinants of Health     Financial Resource Strain: Not on file   Food Insecurity: No Food Insecurity (10/12/2023)    Food Insecurity     Food Insecurity: Never true   Transportation Needs: No Transportation Needs (10/12/2023)    Transportation Needs     Lack of Transportation: No   Physical Activity: Not on file   Stress: Not on file   Social Connections: Not on file   Housing Stability: Low Risk  (10/12/2023)    Housing Stability     Housing Instability: No     Housing Instability Emergency: Not on file        Medications:   Current Outpatient Medications   Medication Sig Dispense Refill    pentosan polysulfate (ELMIRON) 100 MG Oral Cap Take 1 capsule (100 mg total) by mouth 3 (three) times daily before meals. 270 capsule 3    furosemide 20 MG Oral Tab Take 1 tablet (20 mg total) by mouth 2 (two) times daily. prn      JANUVIA 25 MG Oral Tab Take 1 tablet (25 mg total) by mouth daily.      OZEMPIC, 1 MG/DOSE, 4 MG/3ML Subcutaneous Solution Pen-injector INJECT 1 MG SUBCUTANEOUSLY WEEKLY      NON FORMULARY Kdur 1 tab qd      acetaminophen (TYLENOL EXTRA STRENGTH) 500 MG Oral Tab Take 1 tablet (500 mg total) by mouth every 6 (six) hours as needed for Pain.      allopurinol 100 MG Oral Tab Take 0.5 tablets (50 mg total) by mouth every other day.      Magnesium 200 MG Oral Tab Take 2 tablets (400 mg total) by mouth in the morning and 2 tablets (400 mg total) before bedtime.      zolpidem 10 MG Oral Tab 1/2-1 tablet po at  bedtime prn      ALPRAZolam 0.25 MG Oral Tab Take 1 tablet (0.25 mg total) by mouth daily as needed.      insulin glargine (LANTUS SOLOSTAR) 100 UNIT/ML Subcutaneous Solution Pen-injector Inject 30 Units into the skin 2 (two) times daily.      rosuvastatin 5 MG Oral Tab Take 1 tablet (5 mg total) by mouth As Directed. Every Monday and Thursday      tamoxifen 20 MG Oral Tab Take 1 tablet (20 mg total) by mouth daily. 30 tablet 5    folic acid 1 MG Oral Tab Take 1 tablet (1 mg total) by mouth daily. 30 tablet 11    carvedilol 12.5 MG Oral Tab Take 1 tablet (12.5 mg total) by mouth 2 (two) times daily with meals. 60 tablet 0    cloNIDine 0.1 MG Oral Tab Take 1 tablet (0.1 mg total) by mouth in the morning, at noon, and at bedtime. (Patient taking differently: Take 1 tablet (0.1 mg total) by mouth 2 (two) times daily.) 90 tablet 0    semaglutide 2 MG/3ML Subcutaneous Solution Pen-injector Inject 1 mg into the skin once a week. Increase to 0.75 mg next week (Patient not taking: Reported on 11/27/2023)      ondansetron (ZOFRAN) 8 MG tablet Take 1 tablet (8 mg total) by mouth every 8 (eight) hours as needed for Nausea.      gabapentin 300 MG Oral Cap Take 2 capsules (600 mg total) by mouth 2 (two) times daily. 120 capsule 5    Blood Glucose Monitoring Suppl (FREESTYLE FREEDOM LITE) w/Device Does not apply Kit Use as directed. Controlled type 2 diabetes mellitus without complication, with long-term current use of insulin ([facility]).E11.9 1 kit 0    Insulin Pen Needle (BD PEN NEEDLE MINI U/F) 31G X 5 MM Does not apply Misc USE WITH INSULIN TWICE DAILY 200 each 0    FreeStyle Lancets Does not apply Misc 1 each by Finger stick route daily. 1 Box 3    FREESTYLE LITE TEST In Vitro Strip USE TO TEST BLOOD SUGAR ONE TIME DAILY AS DIRECTED 100 strip 3    FREESTYLE SYSTEM Does not apply Kit 1 each by Does not apply route as needed for Other. 1 kit 0    Cetirizine HCl 10 MG Oral Chew Tab Chew 1 tablet (10 mg total) by mouth daily.          Review of Systems: Review of Systems   Constitutional:  Negative for activity change, appetite change, chills, diaphoresis, fatigue, fever and unexpected weight change.   HENT:  Negative for congestion, postnasal drip, rhinorrhea, sinus pressure, sinus pain, sneezing, sore throat, trouble swallowing and voice change.         Alopecia   Respiratory:  Negative for apnea, cough, choking, chest tightness, shortness of breath, wheezing and stridor.    Cardiovascular:  Negative for chest pain, palpitations and leg swelling.   Musculoskeletal:  Negative for arthralgias.   Skin:  Positive for rash. Negative for pallor.   Allergic/Immunologic: Negative for immunocompromised state.   Neurological:  Negative for dizziness and headaches.   Hematological:  Negative for adenopathy.        Physical Exam:  LMP 04/25/2014      Constitutional: alert, cooperative. No acute distress.  HEENT: Head NC/AT.   Respiratory: Thorax symmetrical with no labored breathing.   Neurologic: A&Ox3. No gross motor deficits.  Skin:  dry  Psych: Calm, cooperative. Pleasant affect.    Results:  Personally reviewed    WBC: 3.6, done on 11/10/2023.  HGB: 10.4, done on 11/10/2023.  PLT: 139, done on 11/10/2023.     Glucose: 128, done on 11/10/2023.  Cr: 1.83, done on 11/10/2023.  GFR(AA): 34, done on 1/14/2022.  GFR (non-AA): 30, done on 1/14/2022.  CA: 12.9, done on 11/10/2023.  Na: 136, done on 11/10/2023.  K: 4, done on 11/10/2023.  Cl: 100, done on 11/10/2023.  CO2: 33, done on 11/10/2023.  Last ALB was 3.3% done on 11/10/2023.     No results found.     Final Diagnosis:   A- EBUS-guided transbronchial needle aspiration, lymph node, 11L:  -Adequate for evaluation.  -Non-necrotizing granulomatous inflammation.  -Negative for epithelial malignancy.  -Special stains, AFB and GMS, are negative for microorganisms.     B- EBUS-guided transbronchial needle aspiration, lymph node, station 7:  -Adequate for evaluation.  -Fibrosis, chronic inflammation and  hyalinized granuloma.   -Negative for epithelial malignancy.     C- EBUS-guided transbronchial needle aspiration, lymph node, 11R:  -Adequate for evaluation.  -Non-necrotizing granulomatous inflammation and polymorphous lymphocytes.  -Negative for epithelial malignancy.  -Special stains, AFB and GMS, are negative for microorganisms.         Path Comment Bronchial Wash Predominantly macrophages and bronchial epithelial cells.         Assessment/Plan:  #1. Thoracic lymphadenopathy  Found on workup for hypercalcemia  8/2023 CT chest with mediastinal LAD  10/2023 PET/CT with worsening mediastinal and hilar LAD with FDG uptake. Also with gastrohepatic nodes  Reviewed imaging findings and differential with the patient and sister. We discussed next steps including bronchoscopy with EBUS/TBNA of lymphadenopathy. We discussed the procedure in detail as well as benefits and risks including but not limited to infection, bleeding, pneumothorax, respiratory arrest and death. She is agreeable to proceed with the planned procedure.   11/2023 s/p bronchoscopy with results + non-necrotizing granulomatous inflammation; no e/o epithelial malignancy; see above  Pathology as noted above- predominantly macrophages and bronchial epithelial cells  Preliminary fungal and AFB cultures NTD; bronchial cx no growth  Discussed results at length including results consistent with dx of sarcoidosis however may consider consult with GI for possible EUS if more confirmation needed  Discussed role of steroids however will hold off on starting steroids until final cultures come back in 6 weeks  Recommend evaluation by rheumatologist or sarcoidosis specialist Leia Quiroz MD at [place] as pt diabetic attempting to lose more weight  and concern of effects of steroids on blood glucose, possible wt gain  Discussed hypercalcemia and further Prolia injections per Dr Esparza and nephrologist Dr Blancas; ok from pulmonary standpoint to receive Prolia  1/2024  Final AFB and fungal cultures negative. Pt working on getting into see rheumatologist. A1c 5.8 11/2023. Concern for recurrent hypercalcemia  Discussed close blood glucose monitoring with Dr Malloy  Will give a coarse of steroids until patient can get in to see rheumatology     #2. Chronic respiratory failure with hypoxia  Suspect due to obesity hypoventilation  8/2023 echo with negative bubble study.   Obtain PFTs with ABG  Obtain 6MWT and assess for POC  11/2023 Using and benefiting from oxygen therapy  Currently using 1.5L with activity; tolerates RA at rest  Has not had PFTs or 6MWT as of yet  1/2024 Improved; off oxygen         [facility]   1/8/2024    This visit is conducted using Telemedicine with live, interactive video and audio.    Patient has been referred to the [facility] website at [email or web address] to review the yearly Consent to Treat document.    Patient understands and accepts financial responsibility for any deductible, co-insurance and/or co-pays associated with this service.

## 2024-01-09 RX ORDER — PREDNISONE 10 MG/1
TABLET ORAL
Qty: 30 TABLET | Refills: 0 | Status: SHIPPED | OUTPATIENT
Start: 2024-01-09

## 2024-01-09 NOTE — PATIENT INSTRUCTIONS
We will treat with 1 round of steroids and you need to followup with your primary care to manage your blood sugars  You need to followup with rheumatology for management other than steroids  Followup in 3 months

## 2024-01-14 DIAGNOSIS — G62.9 NEUROPATHY: ICD-10-CM

## 2024-01-15 RX ORDER — GABAPENTIN 300 MG/1
600 CAPSULE ORAL 2 TIMES DAILY
Qty: 120 CAPSULE | Refills: 5 | Status: SHIPPED | OUTPATIENT
Start: 2024-01-15

## 2024-01-30 RX ORDER — TAMOXIFEN CITRATE 20 MG/1
20 TABLET ORAL DAILY
Qty: 90 TABLET | Refills: 3 | Status: SHIPPED | OUTPATIENT
Start: 2024-01-30

## 2024-02-15 ENCOUNTER — OFFICE VISIT (OUTPATIENT)
Dept: RHEUMATOLOGY | Facility: CLINIC | Age: 54
End: 2024-02-15
Payer: COMMERCIAL

## 2024-02-15 VITALS
HEART RATE: 78 BPM | BODY MASS INDEX: 50.02 KG/M2 | TEMPERATURE: 98 F | OXYGEN SATURATION: 98 % | HEIGHT: 64 IN | DIASTOLIC BLOOD PRESSURE: 80 MMHG | SYSTOLIC BLOOD PRESSURE: 126 MMHG | WEIGHT: 293 LBS | RESPIRATION RATE: 18 BRPM

## 2024-02-15 DIAGNOSIS — Z11.59 NEED FOR HEPATITIS B SCREENING TEST: ICD-10-CM

## 2024-02-15 DIAGNOSIS — Z11.59 NEED FOR HEPATITIS C SCREENING TEST: ICD-10-CM

## 2024-02-15 DIAGNOSIS — Z11.4 SCREENING FOR HIV (HUMAN IMMUNODEFICIENCY VIRUS): ICD-10-CM

## 2024-02-15 DIAGNOSIS — E83.52 HYPERCALCEMIA: ICD-10-CM

## 2024-02-15 DIAGNOSIS — K76.89 LIVER NODULE: ICD-10-CM

## 2024-02-15 DIAGNOSIS — L92.9 NON-CASEATING GRANULOMA: Primary | ICD-10-CM

## 2024-02-15 DIAGNOSIS — N18.32 STAGE 3B CHRONIC KIDNEY DISEASE (HCC): ICD-10-CM

## 2024-02-15 DIAGNOSIS — Z11.1 SCREENING FOR TUBERCULOSIS: ICD-10-CM

## 2024-02-15 DIAGNOSIS — D86.9 SARCOID: ICD-10-CM

## 2024-02-15 DIAGNOSIS — C50.811 MALIGNANT NEOPLASM OF OVERLAPPING SITES OF RIGHT FEMALE BREAST, UNSPECIFIED ESTROGEN RECEPTOR STATUS (HCC): ICD-10-CM

## 2024-02-15 DIAGNOSIS — R16.1 SPLENOMEGALY: ICD-10-CM

## 2024-02-15 DIAGNOSIS — R59.1 LYMPHADENOPATHY: ICD-10-CM

## 2024-02-15 PROCEDURE — 99205 OFFICE O/P NEW HI 60 MIN: CPT | Performed by: INTERNAL MEDICINE

## 2024-02-15 NOTE — PATIENT INSTRUCTIONS
-let your eye doctor know about presumptive diagnosis of sarcoid  --need to rule out UVEITIS       Consider shingles vaccine in 3-4 months     ==============================================================================================================    Consider a repeat PET scan.     Humira/Remicade: TNF inhibitor   + a low dose leflunomide.

## 2024-02-15 NOTE — PROGRESS NOTES
Rheumatology New Patient Note  =====================================================================================================    Date of visit: 2/15/2024  ?  Chief complaint: Noncaseating granulomas, suspect sarcoid  Chief Complaint   Patient presents with    [facility]     New pt. Diagnosed with sarcoidosis through biopsy in November. Dr. Us ([facility]) verified diagnosis. Recent history of hypercalcemia. Previous breast cancer. Has had imaging done. No shortness of breath.      Referring (will send letter)  Dr. Us    PCP  Linn Malloy MD  Fax: [phone]  Phone: [phone]    =====================================================================================================  [facility]    Anjali Cruz is a 53 year old female     Here for further evaluation of sarcoidosis given biopsy-proven pulmonary noncaseating granulomas  Had preceding night sweats for 2 months in summer 2023.  Developed urosepsis in Aug 2023. Hypercalcemia noted to max of 14. PET scan indicated thoracic/mediastinal lymphadenopathy and abdominal/retroperitoneal lymphadenopathy. 1,25-OH vit D was noted to be elevated.  -  suddenly in 2023.   -Received  Prolia 10/13/2023  -EBUS/TBBx of demonstrated non-necrotizing granulomatous inflammation.   -contracted shingles in late 2023; took valtrex.   -saw cardiology recently. No issues. TTE in 2023.  -Took recent prednisone taper in 2024 per pulmonary starting at 40 mg daily and decreasing by 10 mg every 3 days until off.    Denies any uveitis, rash, joint pain, palpitations.    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  Notes several coworkers develop cancer    Fhx:  Father with lupus    14 point ROS negative except noted above    Medications:  Current Outpatient Medications on File Prior to Visit   Medication Sig Dispense Refill    tamoxifen 20 MG Oral Tab Take 1 tablet (20  mg total) by mouth daily. 90 tablet 3    GABAPENTIN 300 MG Oral Cap TAKE 2 CAPSULES BY MOUTH 2 TIMES DAILY. 120 capsule 5    pentosan polysulfate (ELMIRON) 100 MG Oral Cap Take 1 capsule (100 mg total) by mouth 3 (three) times daily before meals. 270 capsule 3    OZEMPIC, 1 MG/DOSE, 4 MG/3ML Subcutaneous Solution Pen-injector INJECT 1 MG SUBCUTANEOUSLY WEEKLY      acetaminophen (TYLENOL EXTRA STRENGTH) 500 MG Oral Tab Take 1 tablet (500 mg total) by mouth every 6 (six) hours as needed for Pain.      allopurinol 100 MG Oral Tab Take 0.5 tablets (50 mg total) by mouth every other day.      Magnesium 200 MG Oral Tab Take 2 tablets (400 mg total) by mouth in the morning and 2 tablets (400 mg total) before bedtime.      zolpidem 10 MG Oral Tab 1/2-1 tablet po at bedtime prn      ALPRAZolam 0.25 MG Oral Tab Take 1 tablet (0.25 mg total) by mouth daily as needed.      insulin glargine (LANTUS SOLOSTAR) 100 UNIT/ML Subcutaneous Solution Pen-injector Inject 30 Units into the skin 2 (two) times daily.      rosuvastatin 5 MG Oral Tab Take 1 tablet (5 mg total) by mouth As Directed. Every Monday and Thursday      folic acid 1 MG Oral Tab Take 1 tablet (1 mg total) by mouth daily. 30 tablet 11    carvedilol 12.5 MG Oral Tab Take 1 tablet (12.5 mg total) by mouth 2 (two) times daily with meals. 60 tablet 0    cloNIDine 0.1 MG Oral Tab Take 1 tablet (0.1 mg total) by mouth in the morning, at noon, and at bedtime. (Patient taking differently: Take 1 tablet (0.1 mg total) by mouth 2 (two) times daily.) 90 tablet 0    ondansetron (ZOFRAN) 8 MG tablet Take 1 tablet (8 mg total) by mouth every 8 (eight) hours as needed for Nausea.      Cetirizine HCl 10 MG Oral Chew Tab Chew 1 tablet (10 mg total) by mouth daily.      NON FORMULARY Kdur 1 tab qd      Blood Glucose Monitoring Suppl (FREESTYLE FREEDOM LITE) w/Device Does not apply Kit Use as directed. Controlled type 2 diabetes mellitus without complication, with long-term current use of  insulin ([facility]).E11.9 1 kit 0    Insulin Pen Needle (BD PEN NEEDLE MINI U/F) 31G X 5 MM Does not apply Misc USE WITH INSULIN TWICE DAILY 200 each 0    FreeStyle Lancets Does not apply Misc 1 each by Finger stick route daily. 1 Box 3    FREESTYLE LITE TEST In Vitro Strip USE TO TEST BLOOD SUGAR ONE TIME DAILY AS DIRECTED 100 strip 3    FREESTYLE SYSTEM Does not apply Kit 1 each by Does not apply route as needed for Other. 1 kit 0     No current facility-administered medications on file prior to visit.       Past Medical History:  Past Medical History:   Diagnosis Date    JULIETA (acute kidney injury) ([facility])     Anemia of chronic disease     Anesthesia complication     slow to awaken    Anxiety state     Breast cancer ([facility]) 1/14'    invasive ductal carcinoma    Calculus of kidney     DEPRESSION     weaned off lexapro( was previously also on CYmbalta) 2012--seeing therapist    Depression     DIABETES     diagnosed 2010-    Diabetes ([facility])     Diabetes mellitus ([facility])     on meds    Elevated serum creatinine     Hold ACE-I during Chemo---seeing Dr. Parker    Exposure to radiation     last radiation treatment:9/2014    High blood pressure     High cholesterol     Hx of motion sickness     Hypercalcemia     HYPERLIPIDEMIA     tricor 145 mg was about the same as Tricor 48 mg    HYPERTENSION     Kidney disease     chronic renal disease, monitored by Dr. Vasquez ([facility])    KIDNEY STONE     seeing Dr. Parker--on allopurinol--uric acid kidney stones----Dr. Garber--[facility]    Lymphedema     right arm    Lymphedema     Migraines     Morbid obesity ([facility]) 06/08/2011    Neuropathy     s/p Chemo---in feet    Personal history of antineoplastic chemotherapy     last chemo:7/2014    Shortness of breath     Thoracic lymphadenopathy     Visual impairment     glasses     Past Surgical History:  Past Surgical History:   Procedure Laterality Date    CHEMOTHERAPY      CHOLECYSTECTOMY  2009    CYSTOSCOPY,URETEROSCOPY,LITHOTRIPSY Right  10/10/16    R URS, laser litho, stone extrax, stent, Dr. Buckley    LITHOTRIPSY Right last was around 2008    lithotripsy--x5, Dr. Garber ([facility])    LUMPECTOMY RIGHT Right 2/20/14 ([facility])    Dr.Amrit Marin    OOPHORECTOMY Left     L-oophorectomy--10/16/12--large cyst    OTHER SURGICAL HISTORY  10/18/16    Cysto Stent removal Dr. Buckley    RADIATION RIGHT       Family History:  Family History   Problem Relation Age of Onset    Other (Other) Father         Lupus-53    Diabetes Mother     Hypertension Mother     Other (Other) Sister         Depression    Cancer Paternal Grandmother         thyroid cancer    Cancer Maternal Aunt         breast cancer    Breast Cancer Maternal Aunt 60    Breast Cancer Paternal Aunt 57    Cancer Other         esophageal ca    Breast Cancer Self 43        invasive ductal carcinoma     Social History:  Social History     Socioeconomic History    Marital status:    Tobacco Use    Smoking status: Never     Passive exposure: Never    Smokeless tobacco: Never   Vaping Use    Vaping Use: Never used   Substance and Sexual Activity    Alcohol use: Not Currently     Comment: occasionally    Drug use: No   Social History Narrative    , no children, works in [employer]     Social Determinants of Health     Food Insecurity: No Food Insecurity (10/12/2023)    Food Insecurity     Food Insecurity: Never true   Transportation Needs: No Transportation Needs (10/12/2023)    Transportation Needs     Lack of Transportation: No   Housing Stability: Low Risk  (10/12/2023)    Housing Stability     Housing Instability: No     ?  Allergies:  Allergies   Allergen Reactions    Norvasc [Amlodipine] SWELLING    Shellfish ANAPHYLAXIS     Starts with tongue swelling    Sulfa Antibiotics NAUSEA AND VOMITING and UNKNOWN    Benicar Hct [Olmesartan Medoxomil-Hctz] FATIGUE and JITTERY    Ciprofloxacin OTHER (SEE COMMENTS)     Due to levaquin sensitivity.     Levaquin [Levofloxacin] PAIN     Joint pain     Latex OTHER (SEE COMMENTS)     Added based on information entered during case entry, please review and add reactions, type, and severity as needed  11/17/23   patient denies latex allergy    Lipitor [Atorvastatin Calcium] MYALGIA    Pravachol [Pravastatin Sodium] MYALGIA    Zocor [Simvastatin] MYALGIA    Cefdinir DIARRHEA     Tolerating cefadroxil, with thrush only so far    Cefdinir DIARRHEA     abd pain and dizziness    Glipizide RASH    Metoprolol FATIGUE         Objective    Vitals:    02/15/24 1049   BP: 126/80   Pulse: 78   Resp: 18   Temp: 97.8 °F (36.6 °C)   SpO2: 98%   Weight: (!) 309 lb (140.2 kg)   Height: 5' 4\" (1.626 m)       GEN: NAD, well-nourished.   HEENT: Head: NCAT. Face: No lesions. Eyes: Conjunctiva clear. Sclera are anicteric. PERRLA. EOMs are full. Ears: The right and left ear canals are clear.  Nose: No external or internal nasal deformities. Nasal septum is midline. Mouth: The lips are within normal limits.  No oral ulcers Tongue is midline with no lesions. The oral cavity is clear.   Neck: Supple. No neck masses. No thyromegaly. No LAD, parotid or submandicular gland palpated.   CV: RRR, no mrg, S1/S2  PULM: CTAB, no wrr, easy effort  Extremities: No cyanosis, edema or deformities.   Neurologic: Strength, CN2-12 grossly intact   Psych: normal affect.   Skin: No lesions or rashes.  MSK: 28 joint count performed. No evidence of synovitis in mcp, pip, dip, wrist, elbows, shoulders, hips, knees, ankles, mtp unless otherwise noted. Full ROM of elbows, wrists, knees.        Labs:    10/2023  Calcium 13.5  1, 25 dihydroxy vitamin D 91.1  Vitamin D 35.77  PTH 5.4    11/2023 EBUS TBBx:   A- EBUS-guided transbronchial needle aspiration, lymph node, 11L:  -Adequate for evaluation.  -Non-necrotizing granulomatous inflammation.  -Negative for epithelial malignancy.  -Special stains, AFB and GMS, are negative for microorganisms.     B- EBUS-guided transbronchial needle aspiration, lymph node, station  7:  -Adequate for evaluation.  -Fibrosis, chronic inflammation and hyalinized granuloma.   -Negative for epithelial malignancy.     C- EBUS-guided transbronchial needle aspiration, lymph node, 11R:  -Adequate for evaluation.  -Non-necrotizing granulomatous inflammation and polymorphous lymphocytes.  -Negative for epithelial malignancy.  -Special stains, AFB and GMS, are negative for microorganisms.    10/2023 PET Scan  Impression   CONCLUSION:    1. Worsening FDG avid lymphadenopathy within the mediastinum, bilateral hilum, and upper abdomen/retroperitoneum, which is increased in size from 8/24/2023 CT.  2. Nodular contour of the liver which can be seen in the setting of cirrhosis.  3. Splenomegaly.       Lab Results   Component Value Date    WBC 3.6 (L) 11/10/2023    RBC 3.35 (L) 11/10/2023    HGB 10.4 (L) 11/10/2023    HCT 33.5 (L) 11/10/2023    .0 (L) 11/10/2023    .0 11/10/2023    MCH 31.0 11/10/2023    MCHC 31.0 11/10/2023    RDW 13.1 11/10/2023    NEPRELIM 2.05 11/10/2023    NEUTABS 4.47 06/04/2014    LYMPHABS 1.03 06/04/2014    EOSABS 0.09 06/04/2014    BASABS 0.00 06/04/2014    NEUT 42 06/04/2014    LYMPH 12 (L) 06/04/2014    MON 6 06/04/2014    EOS 1 06/04/2014    NEPERCENT 57.7 11/10/2023    LYPERCENT 20.8 11/10/2023    MOPERCENT 16.1 11/10/2023    EOPERCENT 4.5 11/10/2023    BAPERCENT 0.6 11/10/2023    NE 2.05 11/10/2023    LYMABS 0.74 (L) 11/10/2023    MOABSO 0.57 11/10/2023    EOABSO 0.16 11/10/2023    BAABSO 0.02 11/10/2023     Lab Results   Component Value Date     (H) 11/10/2023    BUN 16 11/10/2023    BUNCREA 29.7 (H) 10/26/2020    CREATSERUM 1.83 (H) 11/10/2023    ANIONGAP 3 11/10/2023    GFR 34 (L) 10/20/2017    GFRNAA 30 (L) 01/14/2022    GFRAA 34 (L) 01/14/2022    CA 12.9 (H) 11/10/2023    OSMOCALC 285 11/10/2023    ALKPHO 52 11/10/2023    AST 31 11/10/2023    ALT 14 11/10/2023    BILT 0.6 11/10/2023    TP 9.1 (H) 11/10/2023    ALB 3.3 (L) 11/10/2023    GLOBULIN 5.8 (H)  11/10/2023    AGRATIO 1.5 09/09/2015     11/10/2023    K 4.0 11/10/2023     11/10/2023    CO2 33.0 (H) 11/10/2023         No results found for: \"ANATI\", \"CAYETANO\", \"ANAS\", \"ANASCRN\", \"ANASCRNRFLX\", \"IVELISSE\"  No results found for: \"SSA\", \"SSAUR\", \"ANTISSA\", \"SSA52\", \"SSA60\", \"SSADD\", \"SSB\", \"ANTISSB\"  No results found for: \"DSDNA\", \"ANTIDSDNA\", \"SMUD\", \"ANTISM\", \"SM\", \"RNP\", \"ANTIRNP\", \"SMITHRNP\"  No results found for: \"SCL70\", \"SCL\", \"GXYUFQV78\"  No results found for: \"C3\", \"C4\"  No results found for: \"DRVVT\", \"LAINT\", \"PTTLUPUS\", \"LUPUSINTERP\", \"LA\", \"Q5ZJ9ROHOV\", \"H6JN7MAFTF\", \"E9FXCMRAQD\", \"X4UDBXOSNP\"  No results found for: \"CARDIOLIPIGG\", \"CARDIOLIPIGM\", \"CARDIOLIPIGA\", \"CARDIOIGA\", \"CARLIP\"      Additional Labs:    Radiology:    Radiology review:      =====================================================================================================  Assessment and Plan    Assessment:  1. Non-caseating granuloma    2. Need for hepatitis B screening test    3. Need for hepatitis C screening test    4. Hypercalcemia    5. Screening for tuberculosis    6. Sarcoid    7. Lymphadenopathy    8. Malignant neoplasm of overlapping sites of right female breast, unspecified estrogen receptor status (HCC)    9. Screening for HIV (human immunodeficiency virus)    10. Splenomegaly    11. Liver nodule    12. Stage 3b chronic kidney disease (HCC)    13. BMI 50.0-59.9, adult (HCC)      #Noncaseating granulomas: From biopsy of mediastinal lymph nodes in 11/2023  #Mediastinal, abdominal lymphadenopathy  #Hypercalcemia: Peaked at 14  -S/p Prolia in 10/2023  -TTE/EKG WNL in 2023.  Has been evaluated by cardiology.  Low suspicion for cardiac sarcoid    Suspect pulmonary and abdominal sarcoidosis with secondary hyper-vitaminosis D (1,25) due to granuloma burden.  Otherwise no other manifestations of systemic sarcoidosis.    #Breast cancer history: On tamoxifen, most recently ALLYN on PET scan  #CKD3b: GFR 30  #Shingles history:  Affected head and neck in December 2023  #Liver nodularity: Concerning for cirrhosis, also could be hepatic sarcoid  #Splenomegaly  ?  Plan:  -Ultrasound of the abdomen to evaluate for potential sarcoid involvement of the liver and spleen.  -Repeat PET scan to reevaluate persistence of thoracic and abdominal lymphadenopathy.  Known history of breast cancer.  See if the lymphadenopathy has grown or progressed after time in recent course of steroid.  -Further evaluation for mimics of sarcoidosis including immune mediated and infection mediated granulomatous disease: Crypto, cocci, urine histo, ANCA, serum histo/blasto antibodies, IgG subclasses, CAYETANO/RF, SPEP  -Repeat hypercalcemia labs: Mag/Phos, ionized calcium, vitamin D (both 25 hydroxy and 1, 25 dihydroxy)  -Screening for hep B/C/HIV/LTBI/baseline pulmonary disease for high risk med use use and monitoring for toxicity  , eval chronic viral inflammatory arthrits: Hepatitis B: sAB, cAB IgM AND Total, HBsAg, Hepatitis C AB, HIV, IGRA  -Discussed with patient need to rule out uveitis, patient seeing eye care provider tomorrow.  Will let them know  -Recommend shingles vaccination series in the coming months  -First-line immunomodulatory therapy for sarcoidosis would include methotrexate.  GFR of 30 and possible cirrhosis would preclude safe use.  -Would consider Humira/Remicade + low dose leflunomide combination given severity of hypercalciuria; see what PET scan shows.  -Consider adding tapering dose of prednisone with initiation of steroid sparing therapy.  Patient does have concerns about steroids in terms of her diabetes    Diagnoses and all orders for this visit:    Non-caseating granuloma  -     HCV Antibody  -     Hep B Core AB, Tot  -     Hepatitis B Surface Antibody  -     Hepatitis B Surface Antigen  -     Quantiferon TB Plus  -     Connective Tissue Disease (CAYETANO) Screen, Reflex Specific Antibody; Future  -     Rheumatoid Arthritis Factor  -     Monoclonal  Protein Study; Future  -     Vitamin D; Future  -     Comp Metabolic Panel (14)  -     CBC With Differential With Platelet  -     C-Reactive Protein  -     Sed Rate, Westergren (Automated)  -     Phosphorus  -     Magnesium  -     Vitamin D; Future  -     Calcium Ionized-Out Patient; Future  -     ANCA Panel Vasculitis; Future  -     Histoplasma Antigen, Urine; Future  -     Coccidioides Immitis Abs; Future  -     Cryptococcus Antigen, Serum; Future  -     Uric Acid  -     Histoplasma Abs, Qn, DID; Future  -     Blastomyces Ab W/Rflx, Serum; Future  -     Immunoglobulin G, Subclasses (1-4); Future  -     PET STANDARD BODY SCAN (ONCOLOGY) (CPT=78815); Future  -     US ABDOMEN COMPLETE (CPT=76700); Future    Need for hepatitis B screening test  -     Hep B Core AB, Tot  -     Hepatitis B Surface Antibody  -     Hepatitis B Surface Antigen    Need for hepatitis C screening test  -     HCV Antibody    Hypercalcemia  -     HCV Antibody  -     Hep B Core AB, Tot  -     Hepatitis B Surface Antibody  -     Hepatitis B Surface Antigen  -     Quantiferon TB Plus  -     Connective Tissue Disease (CAYETANO) Screen, Reflex Specific Antibody; Future  -     Rheumatoid Arthritis Factor  -     Monoclonal Protein Study; Future  -     Vitamin D; Future  -     Comp Metabolic Panel (14)  -     CBC With Differential With Platelet  -     C-Reactive Protein  -     Sed Rate, Westergren (Automated)  -     Phosphorus  -     Magnesium  -     Vitamin D; Future  -     Calcium Ionized-Out Patient; Future  -     ANCA Panel Vasculitis; Future  -     Histoplasma Antigen, Urine; Future  -     Coccidioides Immitis Abs; Future  -     Cryptococcus Antigen, Serum; Future  -     Uric Acid  -     Histoplasma Abs, Qn, DID; Future  -     Blastomyces Ab W/Rflx, Serum; Future  -     Immunoglobulin G, Subclasses (1-4); Future    Screening for tuberculosis  -     Quantiferon TB Plus    Sarcoid  -     HCV Antibody  -     Hep B Core AB, Tot  -     Hepatitis B Surface  Antibody  -     Hepatitis B Surface Antigen  -     Quantiferon TB Plus  -     Connective Tissue Disease (CAYETANO) Screen, Reflex Specific Antibody; Future  -     Rheumatoid Arthritis Factor  -     Monoclonal Protein Study; Future  -     Vitamin D; Future  -     Comp Metabolic Panel (14)  -     CBC With Differential With Platelet  -     C-Reactive Protein  -     Sed Rate, Westergren (Automated)  -     Phosphorus  -     Magnesium  -     Vitamin D; Future  -     Calcium Ionized-Out Patient; Future  -     ANCA Panel Vasculitis; Future  -     Histoplasma Antigen, Urine; Future  -     Coccidioides Immitis Abs; Future  -     Cryptococcus Antigen, Serum; Future  -     Uric Acid  -     Histoplasma Abs, Qn, DID; Future  -     Blastomyces Ab W/Rflx, Serum; Future  -     Immunoglobulin G, Subclasses (1-4); Future    Lymphadenopathy  -     PET STANDARD BODY SCAN (ONCOLOGY) (CPT=78815); Future    Malignant neoplasm of overlapping sites of right female breast, unspecified estrogen receptor status (HCC)  -     PET STANDARD BODY SCAN (ONCOLOGY) (CPT=78815); Future    Screening for HIV (human immunodeficiency virus)  -     HIV Ag/Ab Combo    Splenomegaly  -     US ABDOMEN COMPLETE (CPT=76700); Future    Liver nodule  -     US ABDOMEN COMPLETE (CPT=76700); Future    Stage 3b chronic kidney disease (HCC)    BMI 50.0-59.9, adult (HCC)        No follow-ups on file.      Total time spent by me on DOS was 80 minutes  This includes:   Preparing to see the patient (review of tests, prior medical visits)  Obtaining and/or reviewing separately medically appropriate obtained history  Performing the medically appropriate exam and/or evaluation  Clinical documentation in the EHR or other health record  Counseling and educating the patient or caregiver  Interpreting results and/or communicating results to the patient/family/caregiver  Care coordination  Ordering medications, tests, or procedures  Referring and communicating with other health care  professionals  Documentation in EHR    The above plan of care, diagnosis, orders, and follow-up were discussed with the patient. Questions related to this recommended plan of care were answered.    Thank you for referring this delightful patient to me. Please feel free to contact me with any questions.     This report was performed utilizing speech recognition software technology. Despite proofreading, speech recognition errors could escape detection. If a word or phrase is confusing or out of context, please do not hesitate to call for   clarification.       Kind regards      Jude Kenny MD  [facility]

## 2024-02-20 ENCOUNTER — LAB ENCOUNTER (OUTPATIENT)
Dept: LAB | Age: 54
End: 2024-02-20
Attending: INTERNAL MEDICINE
Payer: COMMERCIAL

## 2024-02-20 DIAGNOSIS — D86.9 SARCOID: ICD-10-CM

## 2024-02-20 DIAGNOSIS — L92.9 NON-CASEATING GRANULOMA: ICD-10-CM

## 2024-02-20 DIAGNOSIS — E83.52 HYPERCALCEMIA: ICD-10-CM

## 2024-02-20 LAB
ALBUMIN SERPL-MCNC: 3.9 G/DL (ref 3.4–5)
ALBUMIN/GLOB SERPL: 0.8 {RATIO} (ref 1–2)
ALP LIVER SERPL-CCNC: 57 U/L
ALT SERPL-CCNC: 22 U/L
ANION GAP SERPL CALC-SCNC: 10 MMOL/L (ref 0–18)
AST SERPL-CCNC: 36 U/L (ref 15–37)
BASOPHILS # BLD AUTO: 0.05 X10(3) UL (ref 0–0.2)
BASOPHILS NFR BLD AUTO: 1 %
BILIRUB SERPL-MCNC: 1 MG/DL (ref 0.1–2)
BUN BLD-MCNC: 24 MG/DL (ref 9–23)
CALCIUM BLD-MCNC: 10.5 MG/DL (ref 8.5–10.1)
CHLORIDE SERPL-SCNC: 104 MMOL/L (ref 98–112)
CO2 SERPL-SCNC: 22 MMOL/L (ref 21–32)
CREAT BLD-MCNC: 2.23 MG/DL
CRP SERPL-MCNC: 0.54 MG/DL (ref ?–0.3)
EGFRCR SERPLBLD CKD-EPI 2021: 26 ML/MIN/1.73M2 (ref 60–?)
EOSINOPHIL # BLD AUTO: 0.1 X10(3) UL (ref 0–0.7)
EOSINOPHIL NFR BLD AUTO: 2 %
ERYTHROCYTE [DISTWIDTH] IN BLOOD BY AUTOMATED COUNT: 15.4 %
ERYTHROCYTE [SEDIMENTATION RATE] IN BLOOD: 72 MM/HR
FASTING STATUS PATIENT QL REPORTED: NO
GLOBULIN PLAS-MCNC: 4.9 G/DL (ref 2.8–4.4)
GLUCOSE BLD-MCNC: 228 MG/DL (ref 70–99)
HCT VFR BLD AUTO: 34 %
HGB BLD-MCNC: 11.1 G/DL
IMM GRANULOCYTES # BLD AUTO: 0.03 X10(3) UL (ref 0–1)
IMM GRANULOCYTES NFR BLD: 0.6 %
LYMPHOCYTES # BLD AUTO: 1.08 X10(3) UL (ref 1–4)
LYMPHOCYTES NFR BLD AUTO: 21.7 %
MAGNESIUM SERPL-MCNC: 1.7 MG/DL (ref 1.6–2.6)
MCH RBC QN AUTO: 32 PG (ref 26–34)
MCHC RBC AUTO-ENTMCNC: 32.6 G/DL (ref 31–37)
MCV RBC AUTO: 98 FL
MONOCYTES # BLD AUTO: 0.54 X10(3) UL (ref 0.1–1)
MONOCYTES NFR BLD AUTO: 10.9 %
NEUTROPHILS # BLD AUTO: 3.17 X10 (3) UL (ref 1.5–7.7)
NEUTROPHILS # BLD AUTO: 3.17 X10(3) UL (ref 1.5–7.7)
NEUTROPHILS NFR BLD AUTO: 63.8 %
OSMOLALITY SERPL CALC.SUM OF ELEC: 293 MOSM/KG (ref 275–295)
PHOSPHATE SERPL-MCNC: 3.1 MG/DL (ref 2.5–4.9)
PLATELET # BLD AUTO: 172 10(3)UL (ref 150–450)
POTASSIUM SERPL-SCNC: 4.9 MMOL/L (ref 3.5–5.1)
PROT SERPL-MCNC: 8.8 G/DL (ref 6.4–8.2)
RBC # BLD AUTO: 3.47 X10(6)UL
SODIUM SERPL-SCNC: 136 MMOL/L (ref 136–145)
URATE SERPL-MCNC: 8.9 MG/DL
WBC # BLD AUTO: 5 X10(3) UL (ref 4–11)

## 2024-02-20 PROCEDURE — 82652 VIT D 1 25-DIHYDROXY: CPT

## 2024-02-20 PROCEDURE — 86612 BLASTOMYCES ANTIBODY: CPT

## 2024-02-20 PROCEDURE — 85652 RBC SED RATE AUTOMATED: CPT | Performed by: INTERNAL MEDICINE

## 2024-02-20 PROCEDURE — 36415 COLL VENOUS BLD VENIPUNCTURE: CPT

## 2024-02-20 PROCEDURE — 86140 C-REACTIVE PROTEIN: CPT | Performed by: INTERNAL MEDICINE

## 2024-02-20 PROCEDURE — 83516 IMMUNOASSAY NONANTIBODY: CPT

## 2024-02-20 PROCEDURE — 86038 ANTINUCLEAR ANTIBODIES: CPT

## 2024-02-20 PROCEDURE — 86704 HEP B CORE ANTIBODY TOTAL: CPT | Performed by: INTERNAL MEDICINE

## 2024-02-20 PROCEDURE — 87385 HISTOPLASMA CAPSUL AG IA: CPT

## 2024-02-20 PROCEDURE — 84550 ASSAY OF BLOOD/URIC ACID: CPT | Performed by: INTERNAL MEDICINE

## 2024-02-20 PROCEDURE — 82330 ASSAY OF CALCIUM: CPT

## 2024-02-20 PROCEDURE — 86706 HEP B SURFACE ANTIBODY: CPT | Performed by: INTERNAL MEDICINE

## 2024-02-20 PROCEDURE — 85025 COMPLETE CBC W/AUTO DIFF WBC: CPT | Performed by: INTERNAL MEDICINE

## 2024-02-20 PROCEDURE — 82787 IGG 1 2 3 OR 4 EACH: CPT

## 2024-02-20 PROCEDURE — 86037 ANCA TITER EACH ANTIBODY: CPT

## 2024-02-20 PROCEDURE — 82306 VITAMIN D 25 HYDROXY: CPT

## 2024-02-20 PROCEDURE — 86334 IMMUNOFIX E-PHORESIS SERUM: CPT

## 2024-02-20 PROCEDURE — 87899 AGENT NOS ASSAY W/OPTIC: CPT

## 2024-02-20 PROCEDURE — 86431 RHEUMATOID FACTOR QUANT: CPT | Performed by: INTERNAL MEDICINE

## 2024-02-20 PROCEDURE — 86698 HISTOPLASMA ANTIBODY: CPT

## 2024-02-20 PROCEDURE — 80053 COMPREHEN METABOLIC PANEL: CPT | Performed by: INTERNAL MEDICINE

## 2024-02-20 PROCEDURE — 86635 COCCIDIOIDES ANTIBODY: CPT

## 2024-02-20 PROCEDURE — 87389 HIV-1 AG W/HIV-1&-2 AB AG IA: CPT | Performed by: INTERNAL MEDICINE

## 2024-02-20 PROCEDURE — 83521 IG LIGHT CHAINS FREE EACH: CPT

## 2024-02-20 PROCEDURE — 86480 TB TEST CELL IMMUN MEASURE: CPT | Performed by: INTERNAL MEDICINE

## 2024-02-20 PROCEDURE — 86225 DNA ANTIBODY NATIVE: CPT

## 2024-02-20 PROCEDURE — 84100 ASSAY OF PHOSPHORUS: CPT | Performed by: INTERNAL MEDICINE

## 2024-02-20 PROCEDURE — 84165 PROTEIN E-PHORESIS SERUM: CPT

## 2024-02-20 PROCEDURE — 83735 ASSAY OF MAGNESIUM: CPT | Performed by: INTERNAL MEDICINE

## 2024-02-20 PROCEDURE — 86803 HEPATITIS C AB TEST: CPT | Performed by: INTERNAL MEDICINE

## 2024-02-20 PROCEDURE — 87340 HEPATITIS B SURFACE AG IA: CPT | Performed by: INTERNAL MEDICINE

## 2024-02-21 ENCOUNTER — TELEPHONE (OUTPATIENT)
Dept: RHEUMATOLOGY | Facility: CLINIC | Age: 54
End: 2024-02-21

## 2024-02-21 DIAGNOSIS — E83.52 HYPERCALCEMIA: ICD-10-CM

## 2024-02-21 DIAGNOSIS — R82.998 DARK URINE: ICD-10-CM

## 2024-02-21 DIAGNOSIS — N18.32 STAGE 3B CHRONIC KIDNEY DISEASE (HCC): Primary | ICD-10-CM

## 2024-02-21 LAB
CRYPTOCOCCAL ANTIGEN, SERUM: NEGATIVE
HBV CORE AB SERPL QL IA: NONREACTIVE
HBV SURFACE AB SER QL: NONREACTIVE
HBV SURFACE AB SERPL IA-ACNC: 3.17 MIU/ML
HBV SURFACE AG SER-ACNC: <0.1 [IU]/L
HBV SURFACE AG SERPL QL IA: NONREACTIVE
HCV AB SERPL QL IA: NONREACTIVE
RHEUMATOID FACT SERPL-ACNC: 20 IU/ML (ref ?–15)
VIT D+METAB SERPL-MCNC: 32.3 NG/ML (ref 30–100)

## 2024-02-22 LAB
ANTI-MPO ANTIBODIES: <0.2 UNITS
ANTI-PR3 ANTIBODIES: <0.2 UNITS
IGG SUBCLASS 1: 872 MG/DL
IGG SUBCLASS 2: 512 MG/DL
IGG SUBCLASS 3: 103 MG/DL
IGG SUBCLASS 4: 13 MG/DL
LC CALCIUM, IONIZED: 5.2 MG/DL
M TB IFN-G CD4+ T-CELLS BLD-ACNC: 0.01 IU/ML
M TB TUBERC IFN-G BLD QL: NEGATIVE
M TB TUBERC IGNF/MITOGEN IGNF CONTROL: 3.33 IU/ML
QFT TB1 AG MINUS NIL: 0 IU/ML
QFT TB2 AG MINUS NIL: 0 IU/ML

## 2024-02-23 LAB — VIT D 1,25 DI-OH: 25.4 PG/ML

## 2024-02-24 LAB
BLASTOMYCES DERMATITIDIS CF AB: NEGATIVE
BLASTOMYCES DERMATITIDIS ID AB: NEGATIVE
HISTOPLASMA ABS, QN, DID: NEGATIVE

## 2024-02-27 LAB
ALBUMIN SERPL ELPH-MCNC: 3.94 G/DL (ref 3.75–5.21)
ALBUMIN/GLOB SERPL: 0.97 {RATIO} (ref 1–2)
ALPHA1 GLOB SERPL ELPH-MCNC: 0.38 G/DL (ref 0.19–0.46)
ALPHA2 GLOB SERPL ELPH-MCNC: 0.85 G/DL (ref 0.48–1.05)
B-GLOBULIN SERPL ELPH-MCNC: 1.17 G/DL (ref 0.68–1.23)
COCCIDIOIDES AB (ID): NOT DETECTED
COCCIDIOIDES AB, IGG: 0.8 IV
COCCIDIOIDES AB, IGM: 0.4 IV
GAMMA GLOB SERPL ELPH-MCNC: 1.67 G/DL (ref 0.62–1.7)
KAPPA LC FREE SER-MCNC: 9.72 MG/DL (ref 0.33–1.94)
KAPPA LC FREE/LAMBDA FREE SER NEPH: 1.48 {RATIO} (ref 0.26–1.65)
LAMBDA LC FREE SERPL-MCNC: 6.59 MG/DL (ref 0.57–2.63)
PROT SERPL-MCNC: 8 G/DL (ref 5.7–8.2)
UR GALACT AG SCR: NEGATIVE

## 2024-02-29 LAB
DSDNA IGG SERPL IA-ACNC: 6.1 IU/ML
ENA AB SER QL IA: 0.3 UG/L
ENA AB SER QL IA: NEGATIVE

## 2024-03-06 ENCOUNTER — TELEPHONE (OUTPATIENT)
Dept: RHEUMATOLOGY | Facility: CLINIC | Age: 54
End: 2024-03-06

## 2024-03-11 ENCOUNTER — HOSPITAL ENCOUNTER (OUTPATIENT)
Dept: NUCLEAR MEDICINE | Facility: HOSPITAL | Age: 54
Discharge: HOME | End: 2024-03-11
Attending: INTERNAL MEDICINE
Payer: COMMERCIAL

## 2024-03-11 ENCOUNTER — HOSPITAL ENCOUNTER (OUTPATIENT)
Dept: NUCLEAR MEDICINE | Facility: HOSPITAL | Age: 54
End: 2024-03-11
Attending: INTERNAL MEDICINE
Payer: COMMERCIAL

## 2024-03-11 DIAGNOSIS — L92.9 NON-CASEATING GRANULOMA: ICD-10-CM

## 2024-03-11 DIAGNOSIS — C50.811 MALIGNANT NEOPLASM OF OVERLAPPING SITES OF RIGHT FEMALE BREAST, UNSPECIFIED ESTROGEN RECEPTOR STATUS (HCC): ICD-10-CM

## 2024-03-11 DIAGNOSIS — R59.1 LYMPHADENOPATHY: ICD-10-CM

## 2024-03-11 LAB — GLUCOSE BLD-MCNC: 261 MG/DL (ref 70–99)

## 2024-03-11 PROCEDURE — 82962 GLUCOSE BLOOD TEST: CPT

## 2024-03-20 ENCOUNTER — HOSPITAL ENCOUNTER (OUTPATIENT)
Dept: ULTRASOUND IMAGING | Age: 54
Discharge: HOME | End: 2024-03-20
Attending: INTERNAL MEDICINE
Payer: COMMERCIAL

## 2024-03-20 DIAGNOSIS — L92.9 NON-CASEATING GRANULOMA: ICD-10-CM

## 2024-03-20 DIAGNOSIS — R16.1 SPLENOMEGALY: ICD-10-CM

## 2024-03-20 DIAGNOSIS — K76.89 LIVER NODULE: ICD-10-CM

## 2024-03-20 PROCEDURE — 76700 US EXAM ABDOM COMPLETE: CPT | Performed by: INTERNAL MEDICINE

## 2024-06-20 RX ORDER — FOLIC ACID 1 MG/1
1 TABLET ORAL DAILY
Qty: 90 TABLET | Refills: 3 | Status: SHIPPED | OUTPATIENT
Start: 2024-06-20

## 2024-07-03 DIAGNOSIS — G62.9 NEUROPATHY: ICD-10-CM

## 2024-07-03 RX ORDER — GABAPENTIN 300 MG/1
600 CAPSULE ORAL 2 TIMES DAILY
Qty: 360 CAPSULE | Refills: 1 | Status: SHIPPED | OUTPATIENT
Start: 2024-07-03

## 2024-07-07 ENCOUNTER — TELEPHONE (OUTPATIENT)
Dept: RHEUMATOLOGY | Facility: CLINIC | Age: 54
End: 2024-07-07

## 2024-07-07 DIAGNOSIS — M10.9 GOUT, UNSPECIFIED CAUSE, UNSPECIFIED CHRONICITY, UNSPECIFIED SITE: ICD-10-CM

## 2024-07-07 DIAGNOSIS — N18.32 STAGE 3B CHRONIC KIDNEY DISEASE (HCC): Primary | ICD-10-CM

## 2024-07-07 NOTE — TELEPHONE ENCOUNTER
Call/msg pt, remind pt to get PET scan. Also I put in a repeat comprehensive metabolic panel (CMP) and uric acid to reassess gout/uric acid kidney stones.

## 2024-07-11 NOTE — TELEPHONE ENCOUNTER
Let pt know. Ok to hold off on PET scan. But would recommend getting comprehensive metabolic panel (CMP), uric acid, and the urine tests that I ordered. If uric acid is high, could benefit from escalating dose of allopurinol which could protect kidnyes.

## 2024-07-11 NOTE — TELEPHONE ENCOUNTER
Called pt and she is ok with getting the lab work/urinalysis done and will do so within the next couple of days. Understands that Dr. Kenny is out of the office until Monday.

## 2024-10-16 DIAGNOSIS — Z17.0 MALIGNANT NEOPLASM OF OVERLAPPING SITES OF RIGHT BREAST IN FEMALE, ESTROGEN RECEPTOR POSITIVE (HCC): Primary | ICD-10-CM

## 2024-10-16 DIAGNOSIS — C50.811 MALIGNANT NEOPLASM OF OVERLAPPING SITES OF RIGHT BREAST IN FEMALE, ESTROGEN RECEPTOR POSITIVE (HCC): Primary | ICD-10-CM

## 2024-10-28 ENCOUNTER — PATIENT MESSAGE (OUTPATIENT)
Dept: RHEUMATOLOGY | Facility: CLINIC | Age: 54
End: 2024-10-28

## 2024-10-28 ENCOUNTER — OFFICE VISIT (OUTPATIENT)
Dept: HEMATOLOGY/ONCOLOGY | Facility: HOSPITAL | Age: 54
End: 2024-10-28
Attending: INTERNAL MEDICINE
Payer: COMMERCIAL

## 2024-10-28 VITALS
OXYGEN SATURATION: 95 % | SYSTOLIC BLOOD PRESSURE: 166 MMHG | BODY MASS INDEX: 51 KG/M2 | HEART RATE: 90 BPM | WEIGHT: 293 LBS | RESPIRATION RATE: 18 BRPM | TEMPERATURE: 98 F | DIASTOLIC BLOOD PRESSURE: 89 MMHG

## 2024-10-28 DIAGNOSIS — D69.6 THROMBOCYTOPENIA (HCC): ICD-10-CM

## 2024-10-28 DIAGNOSIS — Z78.0 POSTMENOPAUSAL: ICD-10-CM

## 2024-10-28 DIAGNOSIS — R77.9 ELEVATED BLOOD PROTEIN: ICD-10-CM

## 2024-10-28 DIAGNOSIS — R31.9 URINARY TRACT INFECTION WITH HEMATURIA, SITE UNSPECIFIED: ICD-10-CM

## 2024-10-28 DIAGNOSIS — N39.0 URINARY TRACT INFECTION WITH HEMATURIA, SITE UNSPECIFIED: ICD-10-CM

## 2024-10-28 DIAGNOSIS — C50.811 MALIGNANT NEOPLASM OF OVERLAPPING SITES OF RIGHT BREAST IN FEMALE, ESTROGEN RECEPTOR POSITIVE (HCC): Primary | ICD-10-CM

## 2024-10-28 DIAGNOSIS — Z17.0 MALIGNANT NEOPLASM OF OVERLAPPING SITES OF RIGHT BREAST IN FEMALE, ESTROGEN RECEPTOR POSITIVE (HCC): Primary | ICD-10-CM

## 2024-10-28 DIAGNOSIS — B37.9 CANDIDA INFECTION: Primary | ICD-10-CM

## 2024-10-28 DIAGNOSIS — E83.52 HYPERCALCEMIA: ICD-10-CM

## 2024-10-28 DIAGNOSIS — D63.1 ANEMIA OF RENAL DISEASE: ICD-10-CM

## 2024-10-28 DIAGNOSIS — D86.9 SARCOIDOSIS: ICD-10-CM

## 2024-10-28 DIAGNOSIS — R82.998 DARK URINE: ICD-10-CM

## 2024-10-28 DIAGNOSIS — N18.9 ANEMIA OF RENAL DISEASE: ICD-10-CM

## 2024-10-28 DIAGNOSIS — R59.9 ADENOPATHY: ICD-10-CM

## 2024-10-28 DIAGNOSIS — K74.69 OTHER CIRRHOSIS OF LIVER (HCC): ICD-10-CM

## 2024-10-28 DIAGNOSIS — N18.32 STAGE 3B CHRONIC KIDNEY DISEASE (HCC): ICD-10-CM

## 2024-10-28 LAB
ALBUMIN SERPL-MCNC: 4.2 G/DL (ref 3.2–4.8)
ALBUMIN/GLOB SERPL: 1 {RATIO} (ref 1–2)
ALP LIVER SERPL-CCNC: 88 U/L
ALT SERPL-CCNC: 16 U/L
ANION GAP SERPL CALC-SCNC: 10 MMOL/L (ref 0–18)
AST SERPL-CCNC: 29 U/L (ref ?–34)
BASOPHILS # BLD AUTO: 0.04 X10(3) UL (ref 0–0.2)
BASOPHILS NFR BLD AUTO: 0.7 %
BILIRUB SERPL-MCNC: 0.6 MG/DL (ref 0.3–1.2)
BILIRUB UR QL STRIP.AUTO: NEGATIVE
BRCA1 C.185DELAG BLD/T QL: 32.2 U/ML (ref ?–38)
BUN BLD-MCNC: 22 MG/DL (ref 9–23)
CALCIUM BLD-MCNC: 10.2 MG/DL (ref 8.7–10.4)
CALCIUM SERPL-MCNC: 2.8 MG/DL
CHLORIDE SERPL-SCNC: 105 MMOL/L (ref 98–112)
CO2 SERPL-SCNC: 21 MMOL/L (ref 21–32)
COLOR UR AUTO: YELLOW
CREAT BLD-MCNC: 1.89 MG/DL
CREAT UR-SCNC: 131 MG/DL
EGFRCR SERPLBLD CKD-EPI 2021: 31 ML/MIN/1.73M2 (ref 60–?)
EOSINOPHIL # BLD AUTO: 0.1 X10(3) UL (ref 0–0.7)
EOSINOPHIL NFR BLD AUTO: 1.8 %
ERYTHROCYTE [DISTWIDTH] IN BLOOD BY AUTOMATED COUNT: 13.2 %
ESTRADIOL SERPL-MCNC: 39.7 PG/ML
FSH SERPL-ACNC: 18.9 MIU/ML
GLOBULIN PLAS-MCNC: 4.1 G/DL (ref 2–3.5)
GLUCOSE BLD-MCNC: 220 MG/DL (ref 70–99)
GLUCOSE UR STRIP.AUTO-MCNC: NORMAL MG/DL
HCT VFR BLD AUTO: 34.8 %
HGB BLD-MCNC: 11.6 G/DL
IMM GRANULOCYTES # BLD AUTO: 0.03 X10(3) UL (ref 0–1)
IMM GRANULOCYTES NFR BLD: 0.5 %
KETONES UR STRIP.AUTO-MCNC: NEGATIVE MG/DL
LEUKOCYTE ESTERASE UR QL STRIP.AUTO: 500
LH SERPL-ACNC: 16.2 MIU/ML
LYMPHOCYTES # BLD AUTO: 0.89 X10(3) UL (ref 1–4)
LYMPHOCYTES NFR BLD AUTO: 15.8 %
MCH RBC QN AUTO: 30.1 PG (ref 26–34)
MCHC RBC AUTO-ENTMCNC: 33.3 G/DL (ref 31–37)
MCV RBC AUTO: 90.4 FL
MONOCYTES # BLD AUTO: 0.46 X10(3) UL (ref 0.1–1)
MONOCYTES NFR BLD AUTO: 8.2 %
NEUTROPHILS # BLD AUTO: 4.11 X10 (3) UL (ref 1.5–7.7)
NEUTROPHILS # BLD AUTO: 4.11 X10(3) UL (ref 1.5–7.7)
NEUTROPHILS NFR BLD AUTO: 73 %
NITRITE UR QL STRIP.AUTO: NEGATIVE
OSMOLALITY SERPL CALC.SUM OF ELEC: 292 MOSM/KG (ref 275–295)
PH UR STRIP.AUTO: 5.5 [PH] (ref 5–8)
PLATELET # BLD AUTO: 113 10(3)UL (ref 150–450)
POTASSIUM SERPL-SCNC: 4.7 MMOL/L (ref 3.5–5.1)
PROT SERPL-MCNC: 8.3 G/DL (ref 5.7–8.2)
PROT UR STRIP.AUTO-MCNC: 50 MG/DL
PROT UR-MCNC: 72.1 MG/DL (ref ?–14)
RBC # BLD AUTO: 3.85 X10(6)UL
RBC #/AREA URNS AUTO: >10 /HPF
SODIUM SERPL-SCNC: 136 MMOL/L (ref 136–145)
SP GR UR STRIP.AUTO: 1.02 (ref 1–1.03)
URATE SERPL-MCNC: 6.4 MG/DL
UROBILINOGEN UR STRIP.AUTO-MCNC: NORMAL MG/DL
WBC # BLD AUTO: 5.6 X10(3) UL (ref 4–11)
WBC #/AREA URNS AUTO: >50 /HPF
WBC CLUMPS UR QL AUTO: PRESENT /HPF
YEAST UR QL: PRESENT /HPF

## 2024-10-28 PROCEDURE — 81001 URINALYSIS AUTO W/SCOPE: CPT | Performed by: INTERNAL MEDICINE

## 2024-10-28 PROCEDURE — 87088 URINE BACTERIA CULTURE: CPT | Performed by: INTERNAL MEDICINE

## 2024-10-28 PROCEDURE — 87186 SC STD MICRODIL/AGAR DIL: CPT | Performed by: INTERNAL MEDICINE

## 2024-10-28 PROCEDURE — 82570 ASSAY OF URINE CREATININE: CPT | Performed by: INTERNAL MEDICINE

## 2024-10-28 PROCEDURE — 84550 ASSAY OF BLOOD/URIC ACID: CPT | Performed by: INTERNAL MEDICINE

## 2024-10-28 PROCEDURE — 99215 OFFICE O/P EST HI 40 MIN: CPT | Performed by: INTERNAL MEDICINE

## 2024-10-28 PROCEDURE — 87086 URINE CULTURE/COLONY COUNT: CPT | Performed by: INTERNAL MEDICINE

## 2024-10-28 PROCEDURE — 84156 ASSAY OF PROTEIN URINE: CPT | Performed by: INTERNAL MEDICINE

## 2024-10-28 PROCEDURE — 80053 COMPREHEN METABOLIC PANEL: CPT | Performed by: INTERNAL MEDICINE

## 2024-10-28 RX ORDER — HYDROXYZINE HYDROCHLORIDE 25 MG/1
25 TABLET, FILM COATED ORAL
COMMUNITY

## 2024-10-28 NOTE — PROGRESS NOTES
Cancer Center Progress Note    Patient Name: Anjali Cruz   YOB: 1970   Medical Record Number: [id number]   CSN: [id number]   Attending Physician: Cierra Thomas M.D.   Referring Physician: MD Dr. Meghan Jarvis, JOSE    Date of Visit: 10/28/2024     Chief Complaint:  Chief Complaint   Patient presents with    Follow - Up     Breast cancer pt here for yearly f/u. Mammogram tomorrow. She has now new breast issues. She is struggling with depression/antidepressants, following with therapist/psychiatrist. She continues on tamoxifen without difficulty.         Diagnosis:   1. H/o stage IIA ([id number]) invasive ductal carcinoma right breast lumpectomy with sentinel lymph node biopsy at [facility] on 2/20/2014. Tumor size was 1.7 cm in its greatest dimension. She had 2 sentinel lymph nodes removed, one came back positive for metastatic carcinoma, measuring 0.8 mm. No extranodal tumor extension was identified. An additional non-sentinel lymph node was removed and no metastasis was seen (she had a total of 3 lymph nodes removed.) Also seen was DCIS nuclear grade 1, estimated size was at least 0.3 cm, cribriform type; no necrosis was seen.     - Received dose dense AC followed by Taxol which she completed 7/17/14 followed by post-op RT.      - Started Tamoxifen 9/26/14. Switched to Exemestane 4/2016 per her preference (hormone levels have been within menopausal range since 1/2014). Estradiol was noted to increase to 33.6 (10/2016) and was switched back to Tamoxifen.    2. Tripped and suffered a nondisplaced bimalleolar fracture while in [place] 1/2020. She was placed in a splint and eventually a walking boot. No surgery was recommended.  She eventually flew back home to [place] and saw ortho.  Xrays done showed no displacement of the bimalleolar fracture and to remain nonweightbearing in a tall boot. By April xrays had demonstrated stable tibiotalar joint and  was recommended to initiate weight bearing in a boot and PT    3. Presented to  9/27/2020 c/o sinus pain, congestion and headaches. Was noted to be hypoxic on RA with sats 78-82%. Was placed on 4 L of O2 and sats increased to 93% She was then emergently brought by ambulance to [facility] ED and was subsequently admitted. She was tested for COVID x 2 and negative. Found to have ARF on CKD with CXR done showing a right sided infiltrate. She was started empirically on antibiotics and hydrated. Subsequent CXR showed findings suspicious for pulmonary edema and further hydration was stopped and diuresed. It appears they empirically placed her on UFH for presumed VTE. She could not have a CTA chest due to ARF/CKD nor a V/Q scan due to her weight. Dopplers done of BLE showed no DVT and eventually heparin this was stopped. Urine cultures eventually grew Enterobacter cloacae.    4. Hospitalized for sepsis 8/2023     - Presented to the ED with fevers, chills, body aches and just did not feel well. At the ED was noted to be tachycardic, febrile (temp of 39.3), hypoxic with sats at 84% and hypotensive.  Creatinine was 3.6 with electrolytes abnormalities, troponin was up, lactate > 3. WBC was elevated with anemia and thrombocytopenia and anuric. She was COVID negative. Pancultured and started empirically on Ceftriaxone which eventually was switched to Zosyn. V/Q scan was low prob for PE. She was bolused with IVF. She was seen by various consultants. She was felt to be in septic shock with ARF on CKD and was admitted to ICU. She was placed on BIPAP.    She was started on CVVH. Cultures eventually showed ESBL E coli felt to be from  source. Antibioitics were switched to Meropenem. Elevation of troponin felt to be from demand ischemia and not ACS. [facility] was consulted by her [facility] primary team for anemia and thrombocytopenia. These were felt to be from sepsis with possible liver disease/splenomegaly contributing. Her  abdominal imaging showed liver findings with cirrhotic configuration with splenomegaly as well as mildly prominent mediastinal nodes.     She slowly improved. She started to urinate and creatinine came down. She no longer needed CVVH. HD catheter was removed. She was afebrile. Platelets improved. She did not require transfusions of PRBCs. WBC normalized. She was discharged home on O2 and to complete course of Invanz outpatient as per ID.       History of Present Illness:  When I last saw Marcia September last year labs we joanne showed an elevated total protein and calcium level with worsening CKD. W/u for hypercalcemia performed including iPTH which came back low. IgG and IgA elevated, SPEP and PTHrp pending. Vitamin D normal. PET scan was ordered. Breast tumor marker CA 27-29 normal. She had recent hospitalization 8/2023 for E coli urosepsis  and was briefly anuric then and required CVVH. Around this time  passed suddenly/unexpectedly. She was subsequently hospitalized. She was seen by renal and placed on calcitonin. She improved and was discharged 10/15/24. Further w/u was done outpatient.     PET showed worsening FDG avid lymphadenopathy as well as nodular contour of the liver and splenomegaly. She was referred to IP and underwent EBUS/bronch on 11/17/23. Biopsies of nodes showed non-necrotizing granulomatous inflammation with no evidence of malignancy. Full path report below. SPEP/ENOCH showed no monoclonal protein. Noted to be pancytopenic with anemia mainly from CKD and recent acute drop from acute illness. W/u revealed no evidence of iron, B12 or folate deficiency. Cirrhosis configuration seen on imaging (likely from hepatic steatosis) and felt to be contributing to cytopenias. Eventually WBC normalized with lymphopenia which she had a long history of.     Ultimately w/u revealed hypercalcemia and adenopathy were from sarcoidosis and not malignancy. Calcium peaked at 14.     Contracted shingles 12/2023 and  was placed on Valtrex.    Was referred to rheum for sarcoidosis.    She reports her mood has not been great. Her PCP has tried different antidepressants but so far have not helped. Options limited as on Tamoxifen. She is losing weight on Ozempic. Also sees a therapist/psych. No current SI/HI. Just frustrated. No bleeding or periods. She continues on Mc. Mammogram scheduled tomorrow.         Authorizing Provider:  Jeremy Us MD        Collected:           11/17/2023 02:20 PM             Ordering Location:     [facility]  Received:            11/17/2023 03:26 PM                                    [facility]                                                                     Pathologist:           Neelima Foley MD                                                           Specimens:   A) - TBNA LN 11L                                                                                      B) - TBNA LN 7, TBNA LN station 7                                                                      C) - TBNA LN 11R                                                                              Final Diagnosis:     A- EBUS-guided transbronchial needle aspiration, lymph node, 11L:  -Adequate for evaluation.  -Non-necrotizing granulomatous inflammation.  -Negative for epithelial malignancy.  -Special stains, AFB and GMS, are negative for microorganisms.     B- EBUS-guided transbronchial needle aspiration, lymph node, station 7:  -Adequate for evaluation.  -Fibrosis, chronic inflammation and hyalinized granuloma.   -Negative for epithelial malignancy.     C- EBUS-guided transbronchial needle aspiration, lymph node, 11R:  -Adequate for evaluation.  -Non-necrotizing granulomatous inflammation and polymorphous lymphocytes.  -Negative for epithelial malignancy.  -Special stains, AFB and GMS, are negative for microorganisms.        Electronically signed by Neelima Foley MD on 11/22/2023 at 5733        Final  Diagnosis Comment      A, B, C. CD68 immunostain is performed and highlights numerous macrophages, supporting the above diagnosis.     Dr. JYOTSNA Shipman has reviewed this case and agrees with this diagnosis.      Ancillary Studies      Immunohistochemical Stain(s):     Material:  Cell block A, B, C  Population:  Tissue     Stain                                Result  CD68                               Positive     Positive tissue controls were utilized in the staining process.  These slides were reviewed by the signout Pathologist and showed appropriate staining results.     Interpreted by: Neelima Foley MD         Methodology:         Immunohistochemical stains are performed on formalin-fixed, paraffin-embedded tissue sections.  Deparaffinization, antigen retrieval, and staining utilizes the automated Leica Leung III immunohistochemistry platform.  A proprietary, non-biotin, polymer-based detection system (Bond Polymer Refine DetectionTM ) is employed.  All antibodies are validated by [facility] Department of Pathology to document appropriate staining reactions.  Positive controls are utilized and show appropriate reactivity.istochemical stains are performed on formalin-fixed, paraffin-embedded tissue sections.  All staining processes are validated by [facility] Department of Pathology to document appropriate staining reactions.  Positive controls are utilized.     Special Stain(s):     Material:  Cell block A, C  Population:  Tissue     Stain                                Result  Afb                                  Negative  GMS                               Negative                                              Positive tissue controls were utilized in the staining process.  These slides were reviewed by the signout Pathologist and showed appropriate staining results.     Interpreted by: Neelima Foley MD      Methodology:         Histochemical stains are performed on formalin-fixed, paraffin-embedded  tissue sections.  All staining processes are validated by [facility] Department of Pathology to document appropriate staining reactions.  Positive controls are utilized.        Clinical Information      R59.0 Thoracic Lymphadenopathy.        Immediate Evaluation      A. EBUS guided fine needle aspiration, 11L lymph node:  Pass #1: Bronchial cells.   Pass#2: Granulomas.     B. EBUS guided fine needle aspiration, station 7 lymph node:  Pass#1: Granulomas.     C. EBUS guided fine needle aspiration, 11R lymph node:  Pass#1: Granulomas and lymphocytes.        Immediate evaluation performed by: Neelima Foley MD        Non Gyne Interpretation     Negative     Electronically signed by Neelima Foley MD on 11/22/2023 at 0952     Gross Description      A. Labeled with the patient’s name, medical record number, EBUS-guided fine needle aspiration 11L lymph node, 2 passes with two slides per pass (one air-dried Diff Quik and one alcohol-fixed Papanicolaou stained slide per pass) and saline rinse for cell block preparation.     B. Labeled with the patient’s name, medical record number, EBUS-guided fine needle aspiration station 7 lymph node, 1 pass with two slides per pass (one air-dried Diff Quik and one alcohol-fixed Papanicolaou stained slide per pass) and saline rinse for cell block preparation.     C. Labeled with the patient’s name, medical record number, EBUS-guided fine needle aspiration 11R lymph node, 1 pass with two slides per pass (one air-dried Diff Quik and one alcohol-fixed Papanicolaou stained slide per pass) and saline rinse for cell block preparation.        Resulting Agency [facility] ([facility])                 Specimen Collected: 11/17/23 14:20 Last Resulted: 11/22/23 09:52         Current Medications:    Current Outpatient Medications:     hydrOXYzine 25 MG Oral Tab, Take 1 tablet (25 mg total) by mouth. One to two at night for sleep and prn, Disp: , Rfl:     GABAPENTIN 300 MG Oral Cap, TAKE 2 CAPSULES  BY MOUTH TWICE A DAY, Disp: 360 capsule, Rfl: 1    FOLIC ACID 1 MG Oral Tab, TAKE 1 TABLET BY MOUTH EVERY DAY, Disp: 90 tablet, Rfl: 3    tamoxifen 20 MG Oral Tab, Take 1 tablet (20 mg total) by mouth daily., Disp: 90 tablet, Rfl: 3    OZEMPIC, 1 MG/DOSE, 4 MG/3ML Subcutaneous Solution Pen-injector, INJECT 1 MG SUBCUTANEOUSLY WEEKLY, Disp: , Rfl:     acetaminophen (TYLENOL EXTRA STRENGTH) 500 MG Oral Tab, Take 1 tablet (500 mg total) by mouth every 6 (six) hours as needed for Pain., Disp: , Rfl:     allopurinol 100 MG Oral Tab, Take 1 tablet (100 mg total) by mouth every other day., Disp: , Rfl:     Magnesium 200 MG Oral Tab, Take 2 tablets (400 mg total) by mouth in the morning and 2 tablets (400 mg total) before bedtime., Disp: , Rfl:     zolpidem 10 MG Oral Tab, 1/2-1 tablet po at bedtime prn, Disp: , Rfl:     ALPRAZolam 0.25 MG Oral Tab, Take 1 tablet (0.25 mg total) by mouth daily as needed., Disp: , Rfl:     insulin glargine (LANTUS SOLOSTAR) 100 UNIT/ML Subcutaneous Solution Pen-injector, Inject 30 Units into the skin 2 (two) times daily., Disp: , Rfl:     rosuvastatin 5 MG Oral Tab, Take 1 tablet (5 mg total) by mouth As Directed. Every Monday and Thursday, Disp: , Rfl:     carvedilol 12.5 MG Oral Tab, Take 1 tablet (12.5 mg total) by mouth 2 (two) times daily with meals., Disp: 60 tablet, Rfl: 0    cloNIDine 0.1 MG Oral Tab, Take 1 tablet (0.1 mg total) by mouth in the morning, at noon, and at bedtime. (Patient taking differently: Take 1 tablet (0.1 mg total) by mouth 2 (two) times daily.), Disp: 90 tablet, Rfl: 0    ondansetron (ZOFRAN) 8 MG tablet, Take 1 tablet (8 mg total) by mouth every 8 (eight) hours as needed for Nausea., Disp: , Rfl:     Blood Glucose Monitoring Suppl (FREESTYLE FREEDOM LITE) w/Device Does not apply Kit, Use as directed. Controlled type 2 diabetes mellitus without complication, with long-term current use of insulin ([facility]).E11.9, Disp: 1 kit, Rfl: 0    Insulin Pen Needle (BD PEN  NEEDLE MINI U/F) 31G X 5 MM Does not apply Misc, USE WITH INSULIN TWICE DAILY, Disp: 200 each, Rfl: 0    FreeStyle Lancets Does not apply Misc, 1 each by Finger stick route daily., Disp: 1 Box, Rfl: 3    FREESTYLE LITE TEST In Vitro Strip, USE TO TEST BLOOD SUGAR ONE TIME DAILY AS DIRECTED, Disp: 100 strip, Rfl: 3    FREESTYLE SYSTEM Does not apply Kit, 1 each by Does not apply route as needed for Other., Disp: 1 kit, Rfl: 0    Cetirizine HCl 10 MG Oral Chew Tab, Chew 1 tablet (10 mg total) by mouth daily., Disp: , Rfl:     Past Medical History:  Past Medical History:    JULIETA (acute kidney injury) (HCC)    Anemia of chronic disease    Anesthesia complication    slow to awaken    Anxiety state    Breast cancer (HCC)    invasive ductal carcinoma    Calculus of kidney    DEPRESSION    weaned off lexapro( was previously also on CYmbalta) 2012--seeing therapist    Depression    DIABETES    diagnosed 2010-    Diabetes (HCC)    Diabetes mellitus (HCC)    on meds    Elevated serum creatinine    Hold ACE-I during Chemo---seeing Dr. Parker    Exposure to radiation    last radiation treatment:9/2014    High blood pressure    High cholesterol    Hx of motion sickness    Hypercalcemia    HYPERLIPIDEMIA    tricor 145 mg was about the same as Tricor 48 mg    HYPERTENSION    Kidney disease    chronic renal disease, monitored by Dr. Vasquez ([facility])    KIDNEY STONE    seeing Dr. Parker--on allopurinol--uric acid kidney stones----Dr. Garber--[facility]    Lymphedema    right arm    Lymphedema    Migraines    Morbid obesity (HCC)    Neuropathy    s/p Chemo---in feet    Personal history of antineoplastic chemotherapy    last chemo:7/2014    Shortness of breath    Thoracic lymphadenopathy    Visual impairment    glasses       Past Surgical History:  Past Surgical History:   Procedure Laterality Date    Chemotherapy      Cholecystectomy  2009    Cystoscopy,ureteroscopy,lithotripsy Right 10/10/16    R URS, laser litho, stone extrax,  stent, Dr. Buckley    Lithotripsy Right last was around     lithotripsy--x5, Dr. Garber ([facility])    Lumpectomy right Right 14 ([facility])     Tania    Oophorectomy Left     L-oophorectomy--10/16/12--large cyst    Other surgical history  10/18/16    Cysto Stent removal Dr. Buckley    Radiation right         Family Medical History:  Family History   Problem Relation Age of Onset    Other (Other) Father         Lupus-53    Diabetes Mother     Hypertension Mother     Other (Other) Sister         Depression    Cancer Paternal Grandmother         thyroid cancer    Cancer Maternal Aunt         breast cancer    Breast Cancer Maternal Aunt 60    Breast Cancer Paternal Aunt 57    Cancer Other         esophageal ca    Breast Cancer Self 43        invasive ductal carcinoma       Gyne History:  OB History    Para Term  AB Living   0 0 0 0 0 0   SAB IAB Ectopic Multiple Live Births   0 0 0 0 0   Obstetric Comments   Menarche: 10-11 y/o   OCP use and fertility use    LMP: 14 d/t chemo       Psychosocial History:  Social History     Socioeconomic History    Marital status:      Spouse name: Not on file    Number of children: Not on file    Years of education: Not on file    Highest education level: Not on file   Occupational History    Not on file   Tobacco Use    Smoking status: Never     Passive exposure: Never    Smokeless tobacco: Never   Vaping Use    Vaping status: Never Used   Substance and Sexual Activity    Alcohol use: Not Currently     Comment: occasionally    Drug use: No    Sexual activity: Not on file   Other Topics Concern    Not on file   Social History Narrative    , no children, works in [employer]     Social Drivers of Health     Financial Resource Strain: Not on file   Food Insecurity: No Food Insecurity (10/12/2023)    Food Insecurity     Food Insecurity: Never true   Transportation Needs: No Transportation Needs (10/12/2023)    Transportation Needs     Lack  of Transportation: No   Physical Activity: Not on file   Stress: Not on file   Social Connections: Unknown (3/13/2021)    [facility]    Social Connections     Conversations with friends/family/neighbors per week: Not on file   Housing Stability: Low Risk  (12/17/2023)    [facility]    Housing Stability     Mortgage Payment Concerns?: Not on file     Number of Places Lived in the Last Year: Not on file     Unstable Housing?: Not on file         Allergies:  Allergies   Allergen Reactions    Norvasc [Amlodipine] SWELLING    Shellfish ANAPHYLAXIS     Starts with tongue swelling    Sulfa Antibiotics NAUSEA AND VOMITING and UNKNOWN    Benicar Hct [Olmesartan Medoxomil-Hctz] FATIGUE and JITTERY    Ciprofloxacin OTHER (SEE COMMENTS)     Due to levaquin sensitivity.     Levaquin [Levofloxacin] PAIN     Joint pain    Lipitor [Atorvastatin Calcium] MYALGIA    Pravachol [Pravastatin Sodium] MYALGIA    Zocor [Simvastatin] MYALGIA    Cefdinir DIARRHEA     Tolerating cefadroxil, with thrush only so far    Cefdinir DIARRHEA     abd pain and dizziness    Glipizide RASH    Metoprolol FATIGUE        Review of Systems:  A 14-point ROS was done with pertinent positives and negative per the HPI    Vital Signs:  BP (!) 166/89 (BP Location: Radial, Patient Position: Sitting, Cuff Size: large)   Pulse 90   Temp 97.9 °F (36.6 °C) (Temporal)   Resp 18   Wt 134.9 kg (297 lb 6.4 oz)   LMP 04/25/2014   SpO2 95%   BMI 51.05 kg/m²       Physical Examination:  General: Patient is alert and oriented x 3, not in acute distress.   Psych:  Tearful at times  HEENT: EOMs intact. PERRL. Oropharynx clear. No thrush  Neck: No JVD. No palpable lymphadenopathy. Neck is supple.  Lymphatics: There is no palpable lymphadenopathy   Chest: Clear to auscultation.  Heart: Regular rate and rhythm.   Abdomen: Soft, non tender with  good bowel sounds.  No hepatosplenomegaly.  No palpable mass.  Extremities: Pedal pulses are present.  BLE edema.   Neurological: Grossly intact.   Breasts: no palpable masses. Incision sites well healed.     Radiology:  PROCEDURE:  PET/CT STANDARD BODY SCAN (ONCOLOGY) (CPT=78815)     COMPARISON:  [facility] , CT, CT CHEST+ABDOMEN+PELVIS(CPT=71250/69601), 8/24/2023, 11:05 PM.     INDICATIONS:  Z17.0 Malignant neoplasm of overlapping sites of right breast in female, estrogen receptor positive  C50.811 Malignant neoplasm of overlapping sites of right b*     TECHNIQUE:  The patient fasted for at least 6 hours. F-18 FDG was injected IV, and whole-body images from vertex to mid-thigh were obtained with concurrent CT scan for both anatomic localization as well as attenuation correction.     RADIOPHARMACEUTICAL:    10.1 mCi F-18 FDG  FASTING GLUCOSE LEVEL:  161 mg/dl  INJECTION TIME:         1045  SCAN TIME:              1148     FINDINGS:    REFERENCE:    Background mediastinal blood pool uptake measures 3.0 SUV.        HEAD/NECK:  No adenopathy within the neck. The visualized paranasal sinuses grossly patent. Salivary glands are unremarkable in appearance.     CHEST:  LUNGS:  Evaluation is limited by motion artifact.  There is mosaic attenuation within the bilateral lower lobes and interstitial thickening/scarring bilaterally.  No FDG avid pulmonary lesions.  PLEURA:  Normal.  MEDIASTINUM/VAHE:  There are multiple mediastinal and hilar lymph nodes, which are increased in size from prior and demonstrate FDG avidity, for example:  There is a 2.3 x 1.0 cm prevascular node, which previously measured 1.9 by is 1.0 centimeters and  demonstrates SUV max of 3.7.  There is a 1.4 x 1.6 cm left para-aortic/prevascular node, which previously measured 1.4 x 1.3 centimeters, with max SUV of 3.7.  There is a 1.5 x 1.5 cm right upper paratracheal node which previously measured 1.0 x 0.8  centimeters, with max SUV of 5.9.  There are  bilateral hilar lymph nodes with elevated FDG uptake, also increased in size.  CARDIAC:  No focal lesion  CHEST WALL:  Mild asymmetric uptake within the right breast.  There is a fluid density structure in the right breast, without associated FDG uptake.     ABDOMEN/PELVIS:  LIVER:    No focal FDG avid lesions.  Mild nodular contour of the liver which can be seen in the setting of cirrhosis.  BILIARY:  Status post cholecystectomy.  SPLEEN:  Splenomegaly to 15 centimeters.  No focal FDG avid lesions.  PANCREAS:  No brian-pancreatic inflammatory stranding  ADRENALS:  Normal.  No mass or enlargement.    KIDNEYS:  Kidneys are symmetrical in size without evidence of hydronephrosis.  Excreted radiotracer within the bilateral collecting systems and urinary bladder, limiting evaluation of this region.  BOWEL/MESENTERY:  Bowel is normal in caliber. No evidence of obstruction.  LYPMHATIC:  There is a 2.8 x 1.7 cm gastrohepatic node, which previously measured 1.3 x 2.2 centimeters, with max SUV of 6.7.  There is a 2.3 x 1.9 cm portal caval node which previously measured 1.6 x 2.0 centimeters, with max SUV of 7.7.  There is a 1.5   cm aortocaval node with max SUV of 5.5, which previously measured 1.1 centimeters.  Few additional FDG avid retroperitoneal lymph nodes are present, which are enlarged from prior.  PELVIS:  No FDG avid lymphadenopathy.    BONES:  No focal FDG avid lesions.  VASCULATURE:  No aneurysm.                  Impression  CONCLUSION:    1. Worsening FDG avid lymphadenopathy within the mediastinum, bilateral hilum, and upper abdomen/retroperitoneum, which is increased in size from 8/24/2023 CT.  2. Nodular contour of the liver which can be seen in the setting of cirrhosis.  3. Splenomegaly.        LOCATION:  [place]           Dictated by (CST): Tray Sebastian MD on 10/09/2023 at 1:07 PM      Finalized by (CST): Tray Sebastian MD on 10/09/2023 at 1:26 PM      PROCEDURE:  CT CHEST+ABDOMEN+PELVIS(CPT=71250/08589)      COMPARISON:  [facility] , XR, XR CHEST AP PORTABLE  (CPT=71045), 8/24/2023, 7:20 PM.     INDICATIONS:  headache, fever, chills, cough, UTI severe sepsis, r/o pyelo/kidney stone     TECHNIQUE:  Following oral contrast administration, unenhanced multislice CT scanning is performed through the chest, abdomen, and pelvis.  Dose reduction techniques were used. Dose information is transmitted to the ACR (American College of Radiology)  NRDR (National Radiology Data Registry) which includes the Dose Index Registry.     PATIENT STATED HISTORY: (As transcribed by Technologist)  Headache, fever, chills, nausea, hypoxia, dyspnea.         FINDINGS:       CHEST:    LUNGS:  Atelectasis at bilateral bases are peripheral interstitial abnormalities the lung bases are noted.  Mild interlobular septal thickening.  Minimal ground-glass densities.  MEDIASTINUM:  AP window 1.4 x 1.3 cm lymph node.  Anterior mediastinal lymph measures 1.9 x 1.0 cm.  VAHE:  No mass or adenopathy.    CARDIAC:  Coronary arterial calcifications are noted.  PLEURA:  No mass or effusion.    CHEST WALL:  No mass or axillary adenopathy.    AORTA:  No aneurysm or dissection.    VASCULATURE:  Pulmonary vessels are unremarkable within the limits of a noncontrast CT.       ABDOMEN/PELVIS:  LIVER:  Cirrhotic configuration of the liver is noted.  BILIARY:  No visible dilatation or calcification.    PANCREAS:  No lesion, fluid collection, ductal dilatation, or atrophy.    SPLEEN:  Spleen is enlarged measuring 16.5 cm.  KIDNEYS:  No mass, obstruction, or calcification.    ADRENALS:  No mass or enlargement.    AORTA:  No aneurysm or dissection.    RETROPERITONEUM:  No mass or adenopathy.    BOWEL/MESENTERY:  Lipomatous hypertrophy of the ileocecal valve.  Small hiatal hernia.  ABDOMINAL WALL:  No mass or hernia.    URINARY BLADDER:  No visible focal wall thickening, lesion, or calculus.    PELVIC NODES:  No adenopathy.    PELVIC ORGANS:  No visible mass.  Pelvic organs  appropriate for patient age.    BONES:  No bony lesion or fracture.                     Impression   CONCLUSION:       1. Cirrhotic configuration of the liver and enlarged spleen is noted.     2. No calculi in the kidneys.  No hydronephrosis.     3. Scattered mild ground-glass densities in the lungs along with atelectasis at the lung bases.  This may be due to mild fluid overload.  No pleural effusions.     4. Mildly prominent mediastinal lymph nodes.  This is of unknown significance.              LOCATION:  [facility]           Dictated by (CST): Artis Hernandez MD on 8/24/2023 at 11:23 PM      Finalized by (CST): Artis Hernandez MD on 8/24/2023 at 11:30 PM       PROCEDURE:  CT BRAIN OR HEAD (88069)     COMPARISON:  None.     INDICATIONS:  difficulty in word finding, AMS intermittently     TECHNIQUE:  Noncontrast CT scanning is performed through the brain. Dose reduction techniques were used. Dose information is transmitted to the ACR (American College of Radiology) NRDR (National Radiology Data Registry) which includes the Dose Index  Registry.     PATIENT STATED HISTORY: (As transcribed by Technologist)  Patient having difficulty in word finding and altered mental status.         FINDINGS:    VENTRICLES/SULCI:  Ventricles and sulci are normal in size.    INTRACRANIAL:  There are no abnormal extraaxial fluid collections.  There is no midline shift.  There are no intraparenchymal brain abnormalities.  There is nothing specific for acute infarct.  There is no hemorrhage or mass lesion.    SINUSES:           No sign of acute sinusitis.    MASTOIDS:          No sign of acute inflammation.  SKULL:             No evidence for fracture or osseous abnormality.  OTHER:             None.                   Impression   CONCLUSION:  The exam is within normal limits.  If there is continued clinical concern for a significant intracranial process, further assessment with MR imaging may be beneficial.           LOCATION:   [facility]        Dictated by (CST): Nu Kilgore DO on 8/27/2023 at 1:28 PM      Finalized by (CST): Nu Kilgore DO on 8/27/2023 at 1:29 PM       PROCEDURE:  [facility] JORGITO 2D+3D DIAGNOSTIC BEA  BILAT (LVN=[id number])     LOCATION:  [place]          COMPARISON:  [facility]EREM[facility] , , [facility] JORGITO 2D+3D DIAGNOSTIC BEA  BILAT (VKL=[id number]), 1/18/2019, 1:06 PM.     INDICATIONS:  Z17.0 Malignant neoplasm of overlapping sites of right breast in female, estrogen receptor positive (HCC) C50.811 Malignant neoplasm of overlapping sites of ri*.  Right lumpectomy with radiation and chemotherapy 2014. No current breast  complaints.       VIEWS OBTAINED:  Diagnostic views of both breasts were obtained.    Standard 2D and additional multiplane thin sections of the breast were obtained for the purpose of Tomosynthesis evaluation.  The images were reconstructed and reviewed on the dedicated Tomosynthesis workstation.     BREAST COMPOSITION:  Scattered areas fibroglandular density.     FINDINGS:  Stable postsurgical and treatment changes.  No new mass lesion, suspicious asymmetry or calcifications.  No suspicious finding with tomosynthesis.  Findings and recommendations were given to patient.                  Impression  CONCLUSION:       BI-RADS CATEGORY:    DIAGNOSTIC CATEGORY 1--NEGATIVE ASSESSMENT.       RECOMMENDATIONS:    ROUTINE MAMMOGRAM AND CLINICAL EVALUATION IN 12 MONTHS.                A letter explaining the results in lay terms has been sent to the patient.  This exam was evaluated with a computer-aided device.  This patient's information has been entered into a reminder system with a target due date for the next mammogram.           Dictated by (CST): Eran Corral MD on 10/03/2022 at 9:00 AM      Finalized by (CST): Eran Corral MD on 10/03/2022 at 9:02 AM      Cardiology:  Conclusions:    1. Left ventricle: The cavity size was normal. Wall thickness was normal.     Systolic function was normal. The estimated ejection fraction was  60-65%.     No diagnostic evidence for regional wall motion abnormalities. Unable to     assess LV diastolic function.  2. Left atrium: The atrium was mildly dilated.  3. Pulmonary arteries: Systolic pressure could not be accurately estimated.  4. Pericardium, extracardiac: A small pericardial effusion was identified.  Impressions:  No previous study was available for comparison.  *    ----------------------------------------------------------------------------  *  Findings:  Left ventricle:  The cavity size was normal. Wall thickness was normal.  Systolic function was normal. The estimated ejection fraction was 60-65%. No  diagnostic evidence for regional wall motion abnormalities. Unable to assess  LV diastolic function.  Left atrium:  The atrium was mildly dilated.  Right ventricle:  The cavity size was normal. Systolic function was normal.  Right atrium:  The atrium was normal in size.  Mitral valve:  The annulus was mildly calcified. Leaflet separation was  normal.  Doppler:  Transvalvular velocity was within the normal range. There  was no evidence for stenosis. There was no significant regurgitation.  Aortic valve:  The valve was structurally normal. The valve was trileaflet.  Cusp separation was normal.  Doppler:  Transvalvular velocity was within the  normal range. There was no evidence for stenosis. There was no significant  regurgitation.  Tricuspid valve:  The valve is structurally normal. Leaflet separation was  normal.  Doppler:  Transvalvular velocity was within the normal range. There  was no evidence for stenosis. There was no significant regurgitation.  Pulmonic valve:   The valve is structurally normal. Cusp separation was  normal.  Doppler:  Transvalvular velocity was within the normal range. There  was no evidence for stenosis. There was no significant regurgitation.  Pericardium:  A small pericardial effusion was identified.  Aorta:  Aortic root: The aortic root was normal-sized.  Ascending  aorta: The ascending aorta was normal.  Pulmonary arteries:  The main pulmonary artery was normal-sized.  Systolic pressure could not be  accurately estimated.  Systemic veins:  Inferior vena cava: The IVC was normally collapsible and normal-sized.    ----------------------------------------------------------------------------  Measurements     Left ventricle                  Value    Ref   LV ID, ED, PLAX                 4.2   cm 3.8 - 5.2   LV ID, ES, PLAX                 3.3   cm 2.2 - 3.5   LV ejection fraction        (L) 46    %  54 - 74     Aortic root                     Value    Ref   Aortic root ID, ED              3.5   cm 3.0 - 4.5     Ascending aorta                 Value    Ref   Ascending aorta ID, A-P, ED     3.3   cm 1.9 - 3.5     Left atrium                     Value    Ref   LA ID, A-P, ES              (H) 4.8   cm 2.7 - 3.8   LA/aortic root ratio            1.37     ---------  Legend:  (L)  and  (H)  henrietta values outside specified reference range.    ----------------------------------------------------------------------------    Prepared and electronically signed by  Oscar Murphy MD  2023 12:44            Specimen Collected: 23 09:21 Last Resulted: 23 12:44             Transthoracic Echocardiogram    Name:Anjali Cruz    Date: 2023 :  1970 Ht:  (64in)  BP: 122 / 56  MRN:  [id number]    Age:  53years    Wt:  (356lb) HR: 75bpm  Loc:  [facility]       Gndr: F          BSA: 2.5m^2  Sonographer: Eleni DAVIS    Ordering:    Mara Reinoso  Consulting:  Mayco, Ricardo Shehan    ----------------------------------------------------------------------------  History/Indications:  Evaluate for shunt.    ----------------------------------------------------------------------------  Procedure information:  A transthoracic echocardiogram, limited study was  performed. Additional evaluation included M-mode and limited 2D.  Patient  status:  Inpatient.  Location:  Room 514.     Comparison was made to the  study of 08/25/2023.    This was a routine study. Transthoracic  echocardiography for diagnosis. Image quality was adequate. Intravenous  contrast (agitated saline) was administered to identify shunting.    ECG rhythm:   Normal sinus    ----------------------------------------------------------------------------    Conclusions:  Atrial septum:  Agitated saline contrast study showed no atrial level shunt,  at baseline or with provocation.    *    ----------------------------------------------------------------------------  *  Findings:  Atrial septum:   Agitated saline contrast study showed no atrial level  shunt, at baseline or with provocation.    ----------------------------------------------------------------------------    Prepared and electronically signed by  Hakan Blanchard MD  08/29/2023 11:43            Specimen Collected: 08/29/23 10:05 Last Resulted: 08/29/23 11:43             Laboratory:  Lab Results   Component Value Date    WBC 5.6 10/28/2024    RBC 3.85 10/28/2024    HGB 11.6 (L) 10/28/2024    HCT 34.8 (L) 10/28/2024    .0 (L) 10/28/2024    MCV 90.4 10/28/2024    MCH 30.1 10/28/2024    MCHC 33.3 10/28/2024    RDW 13.2 10/28/2024    NEPRELIM 4.11 10/28/2024    NEUTABS 4.47 06/04/2014    LYMPHABS 1.03 06/04/2014    EOSABS 0.09 06/04/2014    BASABS 0.00 06/04/2014    NEUT 42 06/04/2014    LYMPH 12 (L) 06/04/2014    MON 6 06/04/2014    EOS 1 06/04/2014    NEPERCENT 73.0 10/28/2024    LYPERCENT 15.8 10/28/2024    MOPERCENT 8.2 10/28/2024    EOPERCENT 1.8 10/28/2024    BAPERCENT 0.7 10/28/2024    NE 4.11 10/28/2024    LYMABS 0.89 (L) 10/28/2024    MOABSO 0.46 10/28/2024    EOABSO 0.10 10/28/2024    BAABSO 0.04 10/28/2024     Lab Results   Component Value Date     (H) 10/28/2024    BUN 22 10/28/2024    BUNCREA 29.7 (H) 10/26/2020    CREATSERUM 1.89 (H) 10/28/2024    ANIONGAP 10 10/28/2024    GFR 34 (L) 10/20/2017    GFRNAA 30 (L) 01/14/2022    GFRAA 34 (L) 01/14/2022     CA 10.2 10/28/2024    OSMOCALC 292 10/28/2024    ALKPHO 88 10/28/2024    AST 29 10/28/2024    ALT 16 10/28/2024    BILT 0.6 10/28/2024    TP 8.3 (H) 10/28/2024    ALB 4.2 10/28/2024    GLOBULIN 4.1 (H) 10/28/2024    AGRATIO 1.5 09/09/2015     10/28/2024    K 4.7 10/28/2024     10/28/2024    CO2 21.0 10/28/2024     Lab Component   Component Value Date/Time    Breast Carcinoma Ag Co9165 32.2 10/28/2024 1531      Latest Reference Range & Units 08/30/23 05:11   FERRITIN 18.0 - 340.0 ng/mL 464.3 (H)   (H): Data is abnormally high   Latest Reference Range & Units 08/28/23 05:15 08/28/23 13:52   Iron, Serum 50 - 170 ug/dL 50    Transferrin 200 - 360 mg/dL 151 (L)    Iron Bind.Cap.(TIBC) 240 - 450 ug/dL 225 (L)    Iron Saturation 15 - 50 % 22    Vitamin B12 193 - 986 pg/mL  >2,000 (H)   (L): Data is abnormally low  (H): Data is abnormally high   Latest Reference Range & Units 08/29/23 05:57   FOLATE (FOLIC ACID), SERUM >=8.7 ng/mL 36.7      Latest Reference Range & Units 09/20/23 10:20   PTH INTACT 18.5 - 88.0 pg/mL <6.3 (L)       Impression and Plan:  1.  H/o breast cancer-  Has been clinically ALLYN though behind in having mammogram done with everything that has happened in the interim. Scheduled for mammogram tomorrow. She has continued on Tamoxifen which she says has manageable side effects. Main issues on this is concomitant use with select SSRIs may result in decreased tamoxifen efficacy. Antidepressants ok to take on Mc have not worked for her she says. Will check hormone levels today. If menopausal may consider stopping and switching to an AI. Stopping Mc alone may help her mood but off this would increase options she can take for her mood. Recommendation currently is max total of 10 years of Mc. She started this 9/2014 and has been on this except for a 6 month period she was on AI then switched back to Mc after her estradiol started increasing.  Has not had any periods for some time. Estradiol  today pending. Tumor markers low    2. Anemia- normocytic, normochromic. Has had mild anemia previously felt to be from CKD. Drop last year from acute illnesses with sepsis. Iron studies not c/w deficiency. Was on folic acid at some point to facilitate erythropoeisis- now off. B12 and folate levels ok, no evidence of hemolysis. Previous SPEP showed no monoclonal protein. Hgb at 11.6 today which is very good for her.     3. Neuropathy stable on Neurotin and will continue. Also diabetic    4. CKD- better. Followed by renal    5. Thrombocytopenia- acute drop during illnesses last year and has since improved but not normalized. No bleeding. Cirrhosis configuration seen on imaging which could be contributing/culprit. W/u otherwise unrevealing. No clumping.     6. Hypercalcemia- from sarcoidosis. Normal today    7. Elevated total protein- elevated globulin. No monoclonal protein seen on SPEP or ENOCH. Therefore does not have a plasma cell dysrasia.     8. Cirrhosis- noted on imaging. She is not aware of previous liver disease. Likely from hepatic steatosis. Was referred to GI but has not seen. LFTS ok our draw    9. Adenopathy- biopsy showed non-necrotizing granulomas felt to be from sarcoidosis.     10. Mood- follows with PCP, psych/therapist. Maybe with stopping Mc her mood may improve and will increase options to treat her mood    11. Sarcoidosis- rheum and pulmonary following. Trial steroid burst last year. Currently not on treatment. Calcium today normal    Labs and imaging reviewed.   I will touch base with lab results and make further recommendations then    RTC 1 year    MD [facility]

## 2024-10-29 ENCOUNTER — HOSPITAL ENCOUNTER (OUTPATIENT)
Dept: MAMMOGRAPHY | Facility: HOSPITAL | Age: 54
Discharge: HOME | End: 2024-10-29
Attending: INTERNAL MEDICINE
Payer: COMMERCIAL

## 2024-10-29 ENCOUNTER — PATIENT MESSAGE (OUTPATIENT)
Dept: HEMATOLOGY/ONCOLOGY | Facility: HOSPITAL | Age: 54
End: 2024-10-29

## 2024-10-29 DIAGNOSIS — C50.811 MALIGNANT NEOPLASM OF OVERLAPPING SITES OF RIGHT BREAST IN FEMALE, ESTROGEN RECEPTOR POSITIVE (HCC): ICD-10-CM

## 2024-10-29 DIAGNOSIS — Z17.0 MALIGNANT NEOPLASM OF OVERLAPPING SITES OF RIGHT BREAST IN FEMALE, ESTROGEN RECEPTOR POSITIVE (HCC): ICD-10-CM

## 2024-10-29 PROCEDURE — 77062 BREAST TOMOSYNTHESIS BI: CPT | Performed by: INTERNAL MEDICINE

## 2024-10-29 PROCEDURE — 77066 DX MAMMO INCL CAD BI: CPT | Performed by: INTERNAL MEDICINE

## 2024-10-29 RX ORDER — FLUCONAZOLE 150 MG/1
TABLET ORAL
Qty: 2 TABLET | Refills: 0 | Status: SHIPPED | OUTPATIENT
Start: 2024-10-29

## 2024-11-27 ENCOUNTER — LAB ENCOUNTER (OUTPATIENT)
Dept: LAB | Age: 54
End: 2024-11-27
Attending: INTERNAL MEDICINE
Payer: COMMERCIAL

## 2024-11-27 DIAGNOSIS — Z78.0 POSTMENOPAUSAL: ICD-10-CM

## 2024-11-27 LAB
ESTRADIOL SERPL-MCNC: 25.4 PG/ML
FSH SERPL-ACNC: 21.2 MIU/ML
LH SERPL-ACNC: 16 MIU/ML

## 2024-11-27 PROCEDURE — 82670 ASSAY OF TOTAL ESTRADIOL: CPT

## 2024-11-27 PROCEDURE — 36415 COLL VENOUS BLD VENIPUNCTURE: CPT

## 2024-11-27 PROCEDURE — 83001 ASSAY OF GONADOTROPIN (FSH): CPT

## 2024-11-27 PROCEDURE — 83002 ASSAY OF GONADOTROPIN (LH): CPT

## 2024-12-02 RX ORDER — EXEMESTANE 25 MG/1
25 TABLET ORAL DAILY
Qty: 30 TABLET | Refills: 11 | Status: SHIPPED | OUTPATIENT
Start: 2024-12-02

## 2025-01-29 DIAGNOSIS — G62.9 NEUROPATHY: ICD-10-CM

## 2025-01-29 RX ORDER — GABAPENTIN 300 MG/1
600 CAPSULE ORAL 2 TIMES DAILY
Qty: 360 CAPSULE | Refills: 1 | Status: SHIPPED | OUTPATIENT
Start: 2025-01-29

## 2025-03-12 RX ORDER — PENTOSAN POLYSULFATE SODIUM 100 MG/1
100 CAPSULE, GELATIN COATED ORAL
Qty: 90 CAPSULE | Refills: 11 | OUTPATIENT
Start: 2025-03-12 | End: 2025-06-10

## 2025-03-27 ENCOUNTER — OFFICE VISIT (OUTPATIENT)
Dept: SURGERY | Facility: CLINIC | Age: 55
End: 2025-03-27
Payer: COMMERCIAL

## 2025-03-27 DIAGNOSIS — N30.10 INTERSTITIAL CYSTITIS: Primary | ICD-10-CM

## 2025-03-27 DIAGNOSIS — N39.0 RECURRENT UTI: ICD-10-CM

## 2025-03-27 LAB
BILIRUBIN: NEGATIVE
GLUCOSE (URINE DIPSTICK): >=1000 MG/DL
KETONES (URINE DIPSTICK): NEGATIVE MG/DL
MULTISTIX LOT#: ABNORMAL NUMERIC
NITRITE, URINE: POSITIVE
PH, URINE: 5.5 (ref 4.5–8)
PROTEIN (URINE DIPSTICK): 30 MG/DL
SPECIFIC GRAVITY: 1.01 (ref 1–1.03)
URINE-COLOR: YELLOW
UROBILINOGEN,SEMI-QN: 0.2 MG/DL (ref 0–1.9)

## 2025-03-27 PROCEDURE — 81003 URINALYSIS AUTO W/O SCOPE: CPT | Performed by: PHYSICIAN ASSISTANT

## 2025-03-27 PROCEDURE — 99214 OFFICE O/P EST MOD 30 MIN: CPT | Performed by: PHYSICIAN ASSISTANT

## 2025-03-27 RX ORDER — CEPHALEXIN 500 MG/1
500 CAPSULE ORAL 3 TIMES DAILY
Qty: 30 CAPSULE | Refills: 0 | Status: SHIPPED | OUTPATIENT
Start: 2025-03-27 | End: 2025-03-27

## 2025-03-27 NOTE — PROGRESS NOTES
Subjective:   Patient is a 54 year old female with anemia, breast CA (s/p lumpectomy, chemo, XRT 2014; remains on tamoxifen), uric acid nephrolithiasis (on Allopurinol with multiple lithotripsies most recent 2016), type II DM (on lantus/metformin), who presents today to follow up.    Last seen December 2023. After discussed patient desired to continue Elmiron BID.   Abdominal ultrasound 3/20/24 with findings c/w cirrhosis. Has blood work in a few weeks with LFTs being checked. Aware she needs eye examination now.     Patient notes worsening urinary complaints over last few weeks. Was treated for UTI a few weeks ago. Prescribed 250mg BID x 7 days. No culture completed because of VV. Last urine culture 10/2024  E Coli pans, tx with Keflex 250 TID x 7 days.     Has been on 50mg hydroxyzine for insomnia/depression.     Drinking 80 oz of water. Occasional caffeine.     PRIOR NOTE:   Multiple stressors.  Was admitted in August 2023 8/24-8/31. ESBL E Coli UTI/bacteremia c/b JULIETA requiring temporary CVVH. Subsequent hospitalizations for hypercalcemia. CT scan negative stones. Diagnosed with sarcoidosis.    passed away.   On ozempic - had taken a few weeks off because of out of stock  Good water intake.   Eye examination coming up.     Does not feel comfortable trying off elmiron. Had done that previously and took a long time for her symptoms to become controlled again.      Patient previously followed by [facility] service for stones and IC on Elmiron. Has done trial off of medication previously with recurrence of pain. Originally placed on in 2017 for pelvic pain worse with voiding, dysuria, and urinary frequency. Cystoscopy with bladder ulcerations. Had been admitted to [facility] in 9/2020 for pneumonia but diagnosed with pyelo at that time also. UCx enterobacter, treated with Rocephin. CT scan negative stones.      History/Other:    Chief Complaint Reviewed and Verified  Nursing Notes Reviewed and   Verified   Tobacco Reviewed  Allergies Reviewed  Medications Reviewed    Social History Reviewed         Current Outpatient Medications   Medication Sig Dispense Refill    GABAPENTIN 300 MG Oral Cap TAKE 2 CAPSULES BY MOUTH TWICE A  capsule 1    exemestane 25 MG Oral Tab Take 1 tablet (25 mg total) by mouth daily. 30 tablet 11    fluconazole (DIFLUCAN) 150 MG Oral Tab 1 po on D#1 and may repeat on D #4 2 tablet 0    hydrOXYzine 25 MG Oral Tab Take 1 tablet (25 mg total) by mouth. One to two at night for sleep and prn      FOLIC ACID 1 MG Oral Tab TAKE 1 TABLET BY MOUTH EVERY DAY 90 tablet 3    tamoxifen 20 MG Oral Tab Take 1 tablet (20 mg total) by mouth daily. 90 tablet 3    OZEMPIC, 1 MG/DOSE, 4 MG/3ML Subcutaneous Solution Pen-injector INJECT 1 MG SUBCUTANEOUSLY WEEKLY      acetaminophen (TYLENOL EXTRA STRENGTH) 500 MG Oral Tab Take 1 tablet (500 mg total) by mouth every 6 (six) hours as needed for Pain.      allopurinol 100 MG Oral Tab Take 1 tablet (100 mg total) by mouth every other day.      Magnesium 200 MG Oral Tab Take 2 tablets (400 mg total) by mouth in the morning and 2 tablets (400 mg total) before bedtime.      zolpidem 10 MG Oral Tab 1/2-1 tablet po at bedtime prn      ALPRAZolam 0.25 MG Oral Tab Take 1 tablet (0.25 mg total) by mouth daily as needed.      insulin glargine (LANTUS SOLOSTAR) 100 UNIT/ML Subcutaneous Solution Pen-injector Inject 30 Units into the skin 2 (two) times daily.      rosuvastatin 5 MG Oral Tab Take 1 tablet (5 mg total) by mouth As Directed. Every Monday and Thursday      carvedilol 12.5 MG Oral Tab Take 1 tablet (12.5 mg total) by mouth 2 (two) times daily with meals. 60 tablet 0    cloNIDine 0.1 MG Oral Tab Take 1 tablet (0.1 mg total) by mouth in the morning, at noon, and at bedtime. (Patient taking differently: Take 1 tablet (0.1 mg total) by mouth 2 (two) times daily.) 90 tablet 0    ondansetron (ZOFRAN) 8 MG tablet Take 1 tablet (8 mg total) by mouth every 8 (eight)  hours as needed for Nausea.      Blood Glucose Monitoring Suppl (FREESTYLE FREEDOM LITE) w/Device Does not apply Kit Use as directed. Controlled type 2 diabetes mellitus without complication, with long-term current use of insulin ([facility]).E11.9 1 kit 0    Insulin Pen Needle (BD PEN NEEDLE MINI U/F) 31G X 5 MM Does not apply Misc USE WITH INSULIN TWICE DAILY 200 each 0    FreeStyle Lancets Does not apply Misc 1 each by Finger stick route daily. 1 Box 3    FREESTYLE LITE TEST In Vitro Strip USE TO TEST BLOOD SUGAR ONE TIME DAILY AS DIRECTED 100 strip 3    FREESTYLE SYSTEM Does not apply Kit 1 each by Does not apply route as needed for Other. 1 kit 0    Cetirizine HCl 10 MG Oral Chew Tab Chew 1 tablet (10 mg total) by mouth daily.         Review of Systems:  Pertinent items are noted in HPI.      Objective:   LMP 04/25/2014  Estimated body mass index is 51.05 kg/m² as calculated from the following:    Height as of 2/15/24: 5' 4\" (1.626 m).    Weight as of 10/28/24: 297 lb 6.4 oz (134.9 kg).    No distress  Non labored respirations    Laboratory Data:  Lab Results   Component Value Date    WBC 5.6 10/28/2024    HGB 11.6 (L) 10/28/2024    .0 (L) 10/28/2024     Lab Results   Component Value Date     10/28/2024    K 4.7 10/28/2024     10/28/2024    CO2 21.0 10/28/2024    BUN 22 10/28/2024     (H) 10/28/2024    GFRAA 34 (L) 01/14/2022    AST 29 10/28/2024    ALT 16 10/28/2024    TP 8.3 (H) 10/28/2024    ALB 4.2 10/28/2024    PHOS 3.1 02/20/2024    CA 10.2 10/28/2024    MG 1.7 02/20/2024       Urinalysis Results (last three years):  Recent Labs     08/24/23  1141 09/27/23  1929 10/12/23  1741 10/28/24  1531 03/27/25  1142   COLORUR Yellow Light-Yellow Light-Yellow Yellow  --    CLARITY Ex.Turbid* Clear Clear Ex.Turbid*  --    SPECGRAVITY 1.028 1.013 1.017 1.020 1.015   PHURINE 5.0 5.5 5.5 5.5 5.5   PROUR 100* Trace* 50* 50*  --    GLUUR Normal 30* Normal Normal  --    KETUR Negative Negative Negative  Negative  --    BILUR Negative Negative Negative Negative  --    BLOODURINE 3+* Negative Negative 2+*  --    NITRITE Negative Negative Negative Negative Positive*   UROBILINOGEN Normal Normal Normal Normal  --    LEUUR 500* Negative Negative 500*  --    WBCUR >50*  --  1-5 >50*  --    RBCUR >10*  --  0-2 >10*  --    BACUR 3+*  --  1+* 2+*  --        Urine Culture Results (last three years):  Lab Results   Component Value Date    URINECUL >100,000 CFU/ML Escherichia coli (A) 10/28/2024    URINECUL >100,000 CFU/ML Escherichia coli  ESBL Pos (A) 08/24/2023    URINECUL >100,000 CFU/ML Escherichia coli  ESBL Pos (A) 08/24/2023    URINECUL  04/09/2019     <10,000 cfu/ml Mixture of Gram positive organisms isolated - probable contamination.    URINECUL  08/23/2017     <10,000 cfu/ml Multiple species present- probable contamination.    URINECUL No Growth 2 Days 06/28/2017    URINECUL No Growth 2 Days 06/09/2017    URINECUL No Growth 2 Days 05/10/2017    URINECUL No Growth 1 Day 04/24/2017    URINECUL No Growth 2 Days 03/29/2017    URINECUL No Growth at 18-24 hrs. 01/30/2017    URINECUL No Growth at 18-24 hrs. 11/15/2016    URINECUL 25,000 CFU/ML Mixed gram positive bowen 10/11/2016    URINECUL No Growth 1 Day 09/27/2016    URINECUL See Result 08/16/2016       Imaging  No results found.    Assessment & Plan:   Recurrent cystitis  Recent flare, suspect ongoing UTI    Reviewed options with patient  She would like to continue Elmiron BID - aware need to monitor LFTs and annual ophthalmology evaluation, which she agrees to complete, script renewed. Would consider trial off after we clear current (suspected) infection.  Send urine for culture today, empiric Keflex 250 (patient unable to tolerate 500mg)  UTI prevention reviewed, unable to use estrace cream.   Recommend daily D Mannose 1 gram daily    Annual follow up or sooner if increased infections.     The above impression and plan were discussed in detail with the patient who  verbalized understanding and all questions were answered.  A total of 30 minutes were spent on the visit including chart review in preparation for the visit, time with the patient, placing orders and communication with other providers where appropriate.        Kaitlyn Montaño PA-C, 3/27/2025

## 2025-03-27 NOTE — PATIENT INSTRUCTIONS
Urinary tract infections can affect your general health and your quality of life.  Some UTI’s can be prevented.  Here are some suggestions that my help prevent frequent UTI’s.     Good Hygiene: Always wipe front to back.  Don’t use perfumed soaps.  Plain soaps like Ivory are the best.  Don’t use washcloths.  Place soap directly on your hands and clean the genital area.  Rinse thoroughly.  Soap can be very irritating to the vaginal mucosa.     Urination: Urinate every 2-3 hours during the day.  Empty your bladder just before going to bed and  first thing when you wake up.  Make sure you go to the bathroom when you have a strong urge. Don't hover over the toilet to urinate.  The bladder may not be completely emptying when using this technique.  Sometimes you may need to \"double-void\".  After you finish urinating, stand up, then sit back down to urinate again.     Clothing: Wear cotton underwear. Change daily.  Avoid tight jeans.       [place]: Sometimes UTI’s are related to intercourse.  Wash genital area before and rinse afterwards.  Empty your bladder before and after intercourse.  Use of vaginal lubricants may be helpful.  Condoms and some lubricants may be irritating to the vaginal mucosa.  Spermicide should be avoided.  Talk to your doctor, you may require a suppressive antibiotic to take after intercourse.     Supplements: Take Vitamin C 500 or 1000mg daily.  Cranberry pills 400mg daily or if symptomatic 400mg two times per day.  Eat yogurt or consider taking a probiotic.  D-mannose 1 gram is another supplement that can help prevent infections.  This one is harder to find, but can be found at stores such as [employer], [employer], or a specialized vitamin store.  Fluid intake should be at least 48oz daily with the goal of 64oz daily.  Water is preferable.      Constipation: Constipation has been linked to UTI’s.  You should be having a soft BM daily.  Dietary fiber should be between 20-25 grams daily.   Flaxseed, All-Bran cereal, Fiber One bars, fruits and vegetables are a good sources of fiber.  Alternatively, Metamucil can be used daily.  Gentle laxatives and stool softeners may be added on a daily or as needed basis if necessary (Miralax, Colace, Pericolace).      Vaginal creams and moisturizers: It may be recommended you try vaginal creams, moisturizers or lubricants as a prevention method.  Over-the-Counter products:  Replens:  1 applicator three times per week  Luvena prebiotic vaginal moisturizer and lubricant:  package of 6.  1 tube every three days at bedtime.  Helps restore pH balance, decrease vaginal dryness, odor and itchiness.  [place] liquibeads  KY silk - moisture enhancer  MeAgain Vaginal Moisturizer.  8 per package.  Use 1 daily for 7 days then 1 daily two times per week.  Astroglide  Prescriptions:  Estrace Cream  Premarin Cream  E-string     Prescription medications: Your doctor may recommend daily or as needed prescription medications including antibiotics to prevent urinary tract infection as well.

## 2025-04-03 RX ORDER — LETROZOLE 2.5 MG/1
2.5 TABLET, FILM COATED ORAL DAILY
Qty: 30 TABLET | Refills: 11 | Status: SHIPPED | OUTPATIENT
Start: 2025-04-03

## 2025-07-07 ENCOUNTER — LAB ENCOUNTER (OUTPATIENT)
Dept: LAB | Age: 55
End: 2025-07-07
Attending: PHYSICIAN ASSISTANT
Payer: COMMERCIAL

## 2025-07-07 DIAGNOSIS — N39.0 RECURRENT UTI: ICD-10-CM

## 2025-07-07 LAB
BILIRUB UR QL STRIP.AUTO: NEGATIVE
COLOR UR AUTO: YELLOW
GLUCOSE UR STRIP.AUTO-MCNC: NORMAL MG/DL
KETONES UR STRIP.AUTO-MCNC: NEGATIVE MG/DL
LEUKOCYTE ESTERASE UR QL STRIP.AUTO: 500
NITRITE UR QL STRIP.AUTO: NEGATIVE
PH UR STRIP.AUTO: 5.5 [PH] (ref 5–8)
PROT UR STRIP.AUTO-MCNC: 30 MG/DL
SP GR UR STRIP.AUTO: 1.02 (ref 1–1.03)
UROBILINOGEN UR STRIP.AUTO-MCNC: NORMAL MG/DL
WBC #/AREA URNS AUTO: >50 /HPF
WBC CLUMPS UR QL AUTO: PRESENT /HPF

## 2025-07-07 PROCEDURE — 87077 CULTURE AEROBIC IDENTIFY: CPT

## 2025-07-07 PROCEDURE — 87186 SC STD MICRODIL/AGAR DIL: CPT

## 2025-07-07 PROCEDURE — 87086 URINE CULTURE/COLONY COUNT: CPT

## 2025-07-07 PROCEDURE — 81001 URINALYSIS AUTO W/SCOPE: CPT

## 2025-07-25 ENCOUNTER — OFFICE VISIT (OUTPATIENT)
Dept: RHEUMATOLOGY | Facility: CLINIC | Age: 55
End: 2025-07-25
Payer: COMMERCIAL

## 2025-07-25 VITALS
RESPIRATION RATE: 16 BRPM | WEIGHT: 293 LBS | HEART RATE: 121 BPM | SYSTOLIC BLOOD PRESSURE: 138 MMHG | HEIGHT: 64 IN | TEMPERATURE: 99 F | OXYGEN SATURATION: 97 % | BODY MASS INDEX: 50.02 KG/M2 | DIASTOLIC BLOOD PRESSURE: 78 MMHG

## 2025-07-25 DIAGNOSIS — R76.8 RHEUMATOID FACTOR POSITIVE: ICD-10-CM

## 2025-07-25 DIAGNOSIS — E11.42 DIABETIC POLYNEUROPATHY ASSOCIATED WITH TYPE 2 DIABETES MELLITUS (HCC): ICD-10-CM

## 2025-07-25 DIAGNOSIS — M10.9 GOUT, UNSPECIFIED CAUSE, UNSPECIFIED CHRONICITY, UNSPECIFIED SITE: ICD-10-CM

## 2025-07-25 DIAGNOSIS — G62.0 CHEMOTHERAPY-INDUCED NEUROPATHY (HCC): ICD-10-CM

## 2025-07-25 DIAGNOSIS — T45.1X5A CHEMOTHERAPY-INDUCED NEUROPATHY (HCC): ICD-10-CM

## 2025-07-25 DIAGNOSIS — N18.32 STAGE 3B CHRONIC KIDNEY DISEASE (HCC): ICD-10-CM

## 2025-07-25 DIAGNOSIS — R16.1 SPLENOMEGALY: ICD-10-CM

## 2025-07-25 DIAGNOSIS — T14.8XXA BONE FRACTURE: ICD-10-CM

## 2025-07-25 DIAGNOSIS — D86.9 SARCOID: Primary | ICD-10-CM

## 2025-07-25 DIAGNOSIS — N20.0 RENAL STONE: ICD-10-CM

## 2025-07-25 DIAGNOSIS — K74.60 HEPATIC CIRRHOSIS, UNSPECIFIED HEPATIC CIRRHOSIS TYPE, UNSPECIFIED WHETHER ASCITES PRESENT (HCC): ICD-10-CM

## 2025-07-25 DIAGNOSIS — L92.9 NON-CASEATING GRANULOMA: ICD-10-CM

## 2025-07-25 DIAGNOSIS — R77.8 ABNORMAL SPEP: ICD-10-CM

## 2025-07-25 DIAGNOSIS — M79.672 PAIN IN BOTH FEET: ICD-10-CM

## 2025-07-25 DIAGNOSIS — M79.671 PAIN IN BOTH FEET: ICD-10-CM

## 2025-07-25 DIAGNOSIS — N95.1 POST MENOPAUSAL SYNDROME: ICD-10-CM

## 2025-07-25 PROCEDURE — 99215 OFFICE O/P EST HI 40 MIN: CPT | Performed by: INTERNAL MEDICINE

## 2025-07-25 RX ORDER — SITAGLIPTIN 50 MG/1
50 TABLET, FILM COATED ORAL DAILY
COMMUNITY
Start: 2025-06-05

## 2025-07-25 RX ORDER — PIOGLITAZONE 30 MG/1
30 TABLET ORAL DAILY
COMMUNITY
Start: 2025-05-27

## 2025-07-25 RX ORDER — METFORMIN HYDROCHLORIDE 500 MG/1
500 TABLET, EXTENDED RELEASE ORAL 2 TIMES DAILY WITH MEALS
COMMUNITY
Start: 2025-06-05

## 2025-07-25 NOTE — PROGRESS NOTES
The following individual(s) verbally consented to be recorded using ambient AI listening technology and understand that they can each withdraw their consent to this listening technology at any point by asking the clinician to turn off or pause the recording:    Patient name: Anjali Cruz  Additional names:

## 2025-07-25 NOTE — PROGRESS NOTES
Rheumatology f/u Patient Note  =====================================================================================================    Chief complaint: Noncaseating granulomas, sarcoid  Chief Complaint   Patient presents with    Follow - Up     LOV: 2/15/24  Patient states that she's feeling the same. Neuropathy pain in joints, knees (4/10), feet (7/10), hands (5/10)  Rapid 3: 2.7     Referring  Dr. Us    PCP  Linn Malloy MD  Fax: [phone]  Phone: [phone]    =====================================================================================================  HPI Date of visit: 2/15/2024  Anjali Cruz is a 55 year old female   Here for further evaluation of sarcoidosis given biopsy-proven pulmonary noncaseating granulomas  Had preceding night sweats for 2 months in summer 2023.  Developed urosepsis in Aug 2023. Hypercalcemia noted to max of 14. PET scan indicated thoracic/mediastinal lymphadenopathy and abdominal/retroperitoneal lymphadenopathy. 1,25-OH vit D was noted to be elevated.  -  suddenly in 2023.   -Received  Prolia 10/13/2023  -EBUS/TBBx of demonstrated non-necrotizing granulomatous inflammation.   -contracted shingles in late 2023; took valtrex.   -saw cardiology recently. No issues. TTE in 2023.  -Took recent prednisone taper in 2024 per pulmonary starting at 40 mg daily and decreasing by 10 mg every 3 days until off.  Denies any uveitis, rash, joint pain, palpitations.  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  Notes several coworkers develop cancer  Fhx:  Father with lupus  ==============================================================================================================  Today's Visit: 25    Marcia Cruz is a 55 year old female with suspected sarcoidosis and recurrent uric acid kidney stones who presents for follow-up on her chronic conditions.    Lost to  follow-up for around 18 months.  Struggling with mental health.   passed away since the last visit unfortunately.    She has a history of sarcoidosis affecting her mediastinum and likely liver, with no new symptoms such as shortness of breath or eye inflammation. She reports a rash, for which her primary care doctor prescribed a cream, and notes that she tends to get skin issues with stress. Her last blood work was about a year ago, with no recent imaging studies performed.    She has a history of hypercalcemia, previously managed with Prolia and prednisone, with stable calcium levels since treatment. She is currently on letrozole after ten years on tamoxifen for breast cancer but has not had a bone density scan.    She experiences chronic uric acid kidney stones and is on allopurinol, currently at 100 mg daily, which was reduced from 300 mg by her primary care doctor, who was concerned about her kidneys. She has had numerous kidney stones and multiple lithotripsies, contributing to her kidney issues. She reports toe pain, questioning if it could be related to gout, though she has not experienced classic gout attacks. On allopurinol since late 20s. Hundreds of renal stones in the past. Uric acid stones.    Joint pain is present in her legs, knees, feet, hands, and elbows, some of which she attributes to past injuries, including multiple fractures in her feet and toes. She has not had a bone density scan or recent foot x-rays.  -Frequent falls and fractures are noted, attributed to balance issues and clumsiness.  -hx of chemo neuropathy and likely diabetic neuropathy  -no hx of charcot joint    She has a family history of lupus, as her father passed away from the disease. She was tested for lupus over ten years ago and expresses concern about potential autoimmune conditions given her family history and current symptoms.    She reports numbness, tingling, and pain in her feet, attributed to neuropathy from  chemotherapy and diabetic neuropathy.     She is on long-term disability for mental health reasons following the death of her , which occurred while she was hospitalized for sepsis and sarcoidosis. She reports significant stress and trauma, impacting her ability to care for herself.      5 point ROS negative except noted above  I had reviewed past medical and family histories together with allergy and medication lists documented.      14 point ROS negative except noted above    Medications:  Current Outpatient Medications on File Prior to Visit   Medication Sig Dispense Refill    metFORMIN  MG Oral Tablet 24 Hr Take 1 tablet (500 mg total) by mouth 2 (two) times daily with meals.      pioglitazone 30 MG Oral Tab Take 1 tablet (30 mg total) by mouth daily.      JANUVIA 50 MG Oral Tab Take 1 tablet (50 mg total) by mouth daily.      letrozole 2.5 MG Oral Tab Take 1 tablet (2.5 mg total) by mouth daily. 30 tablet 11    GABAPENTIN 300 MG Oral Cap TAKE 2 CAPSULES BY MOUTH TWICE A  capsule 1    FOLIC ACID 1 MG Oral Tab TAKE 1 TABLET BY MOUTH EVERY DAY 90 tablet 3    OZEMPIC, 1 MG/DOSE, 4 MG/3ML Subcutaneous Solution Pen-injector INJECT 1 MG SUBCUTANEOUSLY WEEKLY      acetaminophen (TYLENOL EXTRA STRENGTH) 500 MG Oral Tab Take 1 tablet (500 mg total) by mouth every 6 (six) hours as needed for Pain.      allopurinol 100 MG Oral Tab Take 1 tablet (100 mg total) by mouth every other day.      Magnesium 200 MG Oral Tab Take 2 tablets (400 mg total) by mouth in the morning and 2 tablets (400 mg total) before bedtime.      ALPRAZolam 0.25 MG Oral Tab Take 1 tablet (0.25 mg total) by mouth daily as needed.      rosuvastatin 5 MG Oral Tab Take 1 tablet (5 mg total) by mouth As Directed. Every Monday and Thursday      carvedilol 12.5 MG Oral Tab Take 1 tablet (12.5 mg total) by mouth 2 (two) times daily with meals. 60 tablet 0    cloNIDine 0.1 MG Oral Tab Take 1 tablet (0.1 mg total) by mouth in the morning, at  noon, and at bedtime. (Patient taking differently: Take 1 tablet (0.1 mg total) by mouth in the morning and 1 tablet (0.1 mg total) before bedtime.) 90 tablet 0    ondansetron (ZOFRAN) 8 MG tablet Take 1 tablet (8 mg total) by mouth every 8 (eight) hours as needed for Nausea.      Blood Glucose Monitoring Suppl (FREESTYLE FREEDOM LITE) w/Device Does not apply Kit Use as directed. Controlled type 2 diabetes mellitus without complication, with long-term current use of insulin ([facility]).E11.9 1 kit 0    Insulin Pen Needle (BD PEN NEEDLE MINI U/F) 31G X 5 MM Does not apply Misc USE WITH INSULIN TWICE DAILY 200 each 0    FreeStyle Lancets Does not apply Misc 1 each by Finger stick route daily. 1 Box 3    FREESTYLE LITE TEST In Vitro Strip USE TO TEST BLOOD SUGAR ONE TIME DAILY AS DIRECTED 100 strip 3    FREESTYLE SYSTEM Does not apply Kit 1 each by Does not apply route as needed for Other. 1 kit 0    Cetirizine HCl 10 MG Oral Chew Tab Chew 1 tablet (10 mg total) by mouth daily.      pentosan polysulfate 100 MG Oral Cap Take 1 capsule (100 mg total) by mouth 3 (three) times daily before meals. (Patient not taking: Reported on 7/25/2025) 270 capsule 3    fluconazole (DIFLUCAN) 150 MG Oral Tab 1 po on D#1 and may repeat on D #4 (Patient not taking: Reported on 7/25/2025) 2 tablet 0    hydrOXYzine 25 MG Oral Tab Take 1 tablet (25 mg total) by mouth. One to two at night for sleep and prn (Patient not taking: Reported on 7/25/2025)      tamoxifen 20 MG Oral Tab Take 1 tablet (20 mg total) by mouth daily. (Patient not taking: Reported on 7/25/2025) 90 tablet 3    insulin glargine (LANTUS SOLOSTAR) 100 UNIT/ML Subcutaneous Solution Pen-injector Inject 30 Units into the skin 2 (two) times daily.       No current facility-administered medications on file prior to visit.   ?  Allergies:  Allergies   Allergen Reactions    Norvasc [Amlodipine] SWELLING    Shellfish ANAPHYLAXIS     Starts with tongue swelling    Sulfa Antibiotics NAUSEA  AND VOMITING and UNKNOWN    Benicar Hct [Olmesartan Medoxomil-Hctz] FATIGUE and JITTERY    Ciprofloxacin OTHER (SEE COMMENTS)     Due to levaquin sensitivity.     Levaquin [Levofloxacin] PAIN     Joint pain    Lipitor [Atorvastatin Calcium] MYALGIA    Pravachol [Pravastatin Sodium] MYALGIA    Zocor [Simvastatin] MYALGIA    Cefdinir DIARRHEA     Tolerating cefadroxil, with thrush only so far    Cefdinir DIARRHEA     abd pain and dizziness    Glipizide RASH    Metoprolol FATIGUE         Objective    Vitals:    07/25/25 0835   BP: 138/78   Pulse: (!) 121   Resp: 16   Temp: 98.6 °F (37 °C)   SpO2: 97%   Weight: 299 lb (135.6 kg)   Height: 5' 4\" (1.626 m)       GEN: NAD, well-nourished.   HEENT: Head: NCAT. Face: No lesions. Eyes: Conjunctiva clear. Sclera are anicteric. PERRLA. EOMs are full. Ears: The right and left ear canals are clear.  Nose: No external or internal nasal deformities. Nasal septum is midline. Mouth: The lips are within normal limits.  No oral ulcers Tongue is midline with no lesions. The oral cavity is clear.   Neck: Supple. No neck masses. No thyromegaly. No LAD, parotid or submandicular gland palpated.   CV: RRR, no mrg, S1/S2  PULM: CTAB, no wrr, easy effort  Extremities: No cyanosis, edema or deformities.   Neurologic: Strength, CN2-12 grossly intact   Psych: normal affect.   Skin: No lesions or rashes.  MSK: 28 joint count performed. No evidence of synovitis in mcp, pip, dip, wrist, elbows, shoulders, hips, knees, ankles, mtp unless otherwise noted. Full ROM of elbows, wrists, knees.      Thickening of the bilateral MCPs and PIPs with slight tenderness of the MCPs    Labs:        Lab Results   Component Value Date    URIC 6.4 10/28/2024    URIC 8.9 (H) 02/20/2024    URIC 5.1 02/16/2017 4/2025  A1c 10.7    2/2024  Histo urine antigen negative  HIV negative  IgG4 is low  Serum blasto, serum histo negative  Serum crypto negative  Cocci antibodies negative  NC-3/MPO, ANCA negative  Ionized  calcium WNL  25-OH vitamin D 32.3  1, 25-OH vitamin D 25.4  Oligoclonal SPEP noted  CAYETANO by TAMMI panel negative  Uric acid 8.9  RF 20    10/2023  Calcium 13.5  1, 25 dihydroxy vitamin D 91.1  Vitamin D 35.77  PTH 5.4    11/2023 EBUS TBBx:   A- EBUS-guided transbronchial needle aspiration, lymph node, 11L:  -Adequate for evaluation.  -Non-necrotizing granulomatous inflammation.  -Negative for epithelial malignancy.  -Special stains, AFB and GMS, are negative for microorganisms.     B- EBUS-guided transbronchial needle aspiration, lymph node, station 7:  -Adequate for evaluation.  -Fibrosis, chronic inflammation and hyalinized granuloma.   -Negative for epithelial malignancy.     C- EBUS-guided transbronchial needle aspiration, lymph node, 11R:  -Adequate for evaluation.  -Non-necrotizing granulomatous inflammation and polymorphous lymphocytes.  -Negative for epithelial malignancy.  -Special stains, AFB and GMS, are negative for microorganisms.      3/2024 US liver  Impression  CONCLUSION:    1.  Cirrhotic features of the liver without focal mass lesion.    2. Splenomegaly can be seen with portal hypertension.    10/2023 PET Scan  Impression   CONCLUSION:    1. Worsening FDG avid lymphadenopathy within the mediastinum, bilateral hilum, and upper abdomen/retroperitoneum, which is increased in size from 8/24/2023 CT.  2. Nodular contour of the liver which can be seen in the setting of cirrhosis.  3. Splenomegaly.       Lab Results   Component Value Date    WBC 5.6 10/28/2024    RBC 3.85 10/28/2024    HGB 11.6 (L) 10/28/2024    HCT 34.8 (L) 10/28/2024    .0 (L) 10/28/2024    MCV 90.4 10/28/2024    MCH 30.1 10/28/2024    MCHC 33.3 10/28/2024    RDW 13.2 10/28/2024    NEPRELIM 4.11 10/28/2024    NEUTABS 4.47 06/04/2014    LYMPHABS 1.03 06/04/2014    EOSABS 0.09 06/04/2014    BASABS 0.00 06/04/2014    NEUT 42 06/04/2014    LYMPH 12 (L) 06/04/2014    MON 6 06/04/2014    EOS 1 06/04/2014    NEPERCENT 73.0 10/28/2024     LYPERCENT 15.8 10/28/2024    MOPERCENT 8.2 10/28/2024    EOPERCENT 1.8 10/28/2024    BAPERCENT 0.7 10/28/2024    NE 4.11 10/28/2024    LYMABS 0.89 (L) 10/28/2024    MOABSO 0.46 10/28/2024    EOABSO 0.10 10/28/2024    BAABSO 0.04 10/28/2024     Lab Results   Component Value Date     (H) 10/28/2024    BUN 22 10/28/2024    BUNCREA 29.7 (H) 10/26/2020    CREATSERUM 1.89 (H) 10/28/2024    ANIONGAP 10 10/28/2024    GFR 34 (L) 10/20/2017    GFRNAA 30 (L) 01/14/2022    GFRAA 34 (L) 01/14/2022    CA 10.2 10/28/2024    OSMOCALC 292 10/28/2024    ALKPHO 88 10/28/2024    AST 29 10/28/2024    ALT 16 10/28/2024    BILT 0.6 10/28/2024    TP 8.3 (H) 10/28/2024    ALB 4.2 10/28/2024    GLOBULIN 4.1 (H) 10/28/2024    AGRATIO 1.5 09/09/2015     10/28/2024    K 4.7 10/28/2024     10/28/2024    CO2 21.0 10/28/2024         No results found for: \"ANATI\", \"CAYETANO\", \"ANAS\", \"ANASCRN\", \"ANASCRNRFLX\", \"IVELISSE\"  No results found for: \"SSA\", \"SSAUR\", \"ANTISSA\", \"SSA52\", \"SSA60\", \"SSADD\", \"SSB\", \"ANTISSB\"  No results found for: \"DSDNA\", \"ANTIDSDNA\", \"SMUD\", \"ANTISM\", \"SM\", \"RNP\", \"ANTIRNP\", \"SMITHRNP\"  No results found for: \"SCL70\", \"SCL\", \"PSLQIBZ65\"  No results found for: \"C3\", \"C4\"  No results found for: \"DRVVT\", \"LAINT\", \"PTTLUPUS\", \"LUPUSINTERP\", \"LA\", \"I9WX3PCDYA\", \"O0KY2BQTPJ\", \"P6YFTRNFYO\", \"D1RCWPVYLD\"  No results found for: \"CARDIOLIPIGG\", \"CARDIOLIPIGM\", \"CARDIOLIPIGA\", \"CARDIOIGA\", \"CARLIP\"      Additional Labs:    Radiology:    Radiology review:      =====================================================================================================  Assessment and Plan  Assessment:  1. Sarcoid    2. Gout, unspecified cause, unspecified chronicity, unspecified site    3. Non-caseating granuloma    4. Renal stone    5. Post menopausal syndrome    6. Bone fracture    7. Hepatic cirrhosis, unspecified hepatic cirrhosis type, unspecified whether ascites present (HCC)    8. Pain in both feet    9. Splenomegaly    10.  Abnormal SPEP    11. Stage 3b chronic kidney disease (HCC)    12. Rheumatoid factor positive    13. Chemotherapy-induced neuropathy (HCC)    14. Diabetic polyneuropathy associated with type 2 diabetes mellitus (HCC)        #Noncaseating granulomas: From biopsy of mediastinal lymph nodes in 11/2023  #Mediastinal, abdominal lymphadenopathy  #Suspect hepatic sarcoid: Differential includes cirrhosis.  Noted on 2024 ultrasound of the abdomen  #Hypercalcemia: Peaked at 14.    -S/p Prolia in 10/2023. And 40 mg prednisone taper over 1 month in Jan 2024. Serum calcium normalized.  1, 25-OH vitamin D and urine calcium normal after Prolia  -TTE/EKG WNL in 2023.  Has been evaluated by cardiology.  Low suspicion for cardiac sarcoid  - Previously ordered PET scan to be done in spring/summer 2024 which was not completed  - Clinically and based on chemistry profile, sarcoid appears to be quiescent  -Suspect pulmonary and abdominal sarcoidosis with secondary hyper-vitaminosis D (1,25) due to granuloma burden.  Otherwise no other manifestations of systemic sarcoidosis.    #Breast cancer history: On tamoxifen, most recently ALLYN on PET scan  #CKD3b: GFR 30  #Recurrent serum urate stones: Has had several lithotripsies in the past.  Previously on allopurinol 300 mg daily  #Suspect gouty arthropathy: MCP thickening with intermittent tenderness noted  #Splenomegaly  #Recurrent foot fractures: Charcot joint from uncontrolled diabetes with A1c greater than 10 versus osteoporosis  #Family history of lupus: In father  #Length-dependent peripheral neuropathy: Likely due to chemotherapy related neuropathy given temporal association as well as diabetic neuropathy   ?  Plan:  - Update ultrasound of the abdomen to reevaluate hepatic sarcoid versus cirrhosis and splenomegaly previously seen  -X-ray of the bilateral feet to evaluate for fractures given persistent foot pain in the setting of recurrent foot fractures  - DEXA scan to evaluate for  osteoporosis given recurrent foot fractures  - -If not yet completed. Osteoporosis and/or osteopenia work-up: CMP, CBC, vitamin D, PTH, TSH, mag, Phos, 24 hr calcium/Cr.  Consider celiac disease testing. Consider cortisol level if vertebral fractures fractures despite normal/mildly low BMD.  Consider SPEP.  -Update sarcoid labs particularly 1, 25-OH vitamin D  - CAYETANO by TAMMI panel  - Oligoclonal myopathy seen on last SPEP.  Update SPEP.  Obtain rheumatoid factor given slight elevation in the past at this  - Patient pursuing long-term disability because severe mental health issues.  Given the multitude of physical elements as described above with sarcoidosis, recurrent uric acid kidney stones/likely gouty arthropathy and given we are evaluating for severe osteoporosis, I recommend patient be off work until at least our next visit on December 10, 2024 we can safely clear her to resume her occupation.      Total time spent by me on DOS: 42 min  This includes:   Preparing to see the patient (review of tests, prior medical visits)  Obtaining and/or reviewing separately medically appropriate obtained history  Performing the medically appropriate exam and/or evaluation  Clinical documentation in the EHR or other health record  Interpreting results and/or communicating results to the patient/family/caregiver  Ordering medications, tests, or procedures  Documentation in EHR      Diagnoses and all orders for this visit:    Sarcoid  -     XR DEXA BONE DENSITOMETRY (CPT=77080); Future  -     Comp Metabolic Panel (14); Future  -     CBC With Differential With Platelet; Future  -     C-Reactive Protein; Future  -     Sed Rate, Westergren (Automated); Future  -     Urinalysis with Culture Reflex; Future  -     Creatinine, Urine, Random; Future  -     PTH, Intact; Future  -     Phosphorus; Future  -     Magnesium; Future  -     Vitamin D; Future  -     Calcium, Random Urine; Future  -     Connective Tissue Disease (CAYETANO) Screen,  Reflex Specific Antibody; Future  -     US ABDOMEN COMPLETE (CPT=76700); Future  -     XR FOOT, COMPLETE (MIN 3 VIEWS), LEFT (CPT=73630); Future  -     XR FOOT, COMPLETE (MIN 3 VIEWS), RIGHT (CPT=73630); Future  -     Vitamin D; Future  -     Uric Acid; Future  -     Monoclonal Protein Study; Future  -     Rheumatoid Arthritis Factor; Future    Gout, unspecified cause, unspecified chronicity, unspecified site  -     XR FOOT, COMPLETE (MIN 3 VIEWS), LEFT (CPT=73630); Future  -     XR FOOT, COMPLETE (MIN 3 VIEWS), RIGHT (CPT=73630); Future  -     Uric Acid; Future    Non-caseating granuloma  -     XR DEXA BONE DENSITOMETRY (CPT=77080); Future  -     Comp Metabolic Panel (14); Future  -     CBC With Differential With Platelet; Future  -     C-Reactive Protein; Future  -     Sed Rate, Westergren (Automated); Future  -     Urinalysis with Culture Reflex; Future  -     Creatinine, Urine, Random; Future  -     PTH, Intact; Future  -     Phosphorus; Future  -     Magnesium; Future  -     Vitamin D; Future  -     Calcium, Random Urine; Future  -     Connective Tissue Disease (CAYETANO) Screen, Reflex Specific Antibody; Future  -     US ABDOMEN COMPLETE (CPT=76700); Future    Renal stone  -     XR DEXA BONE DENSITOMETRY (CPT=77080); Future  -     Comp Metabolic Panel (14); Future  -     CBC With Differential With Platelet; Future  -     C-Reactive Protein; Future  -     Sed Rate, Westergren (Automated); Future  -     Urinalysis with Culture Reflex; Future  -     Creatinine, Urine, Random; Future  -     PTH, Intact; Future  -     Phosphorus; Future  -     Magnesium; Future  -     Vitamin D; Future  -     Calcium, Random Urine; Future  -     Connective Tissue Disease (CAYETANO) Screen, Reflex Specific Antibody; Future  -     US ABDOMEN COMPLETE (CPT=76700); Future    Post menopausal syndrome  -     XR DEXA BONE DENSITOMETRY (CPT=77080); Future  -     Comp Metabolic Panel (14); Future  -     CBC With Differential With Platelet; Future  -      C-Reactive Protein; Future  -     Sed Rate, Westergren (Automated); Future  -     Urinalysis with Culture Reflex; Future  -     Creatinine, Urine, Random; Future  -     PTH, Intact; Future  -     Phosphorus; Future  -     Magnesium; Future  -     Vitamin D; Future  -     Calcium, Random Urine; Future  -     Connective Tissue Disease (CAYETANO) Screen, Reflex Specific Antibody; Future  -     US ABDOMEN COMPLETE (CPT=76700); Future    Bone fracture  -     XR DEXA BONE DENSITOMETRY (CPT=07480); Future  -     Vitamin D; Future    Hepatic cirrhosis, unspecified hepatic cirrhosis type, unspecified whether ascites present (HCC)  -     US ABDOMEN COMPLETE (CPT=76700); Future    Pain in both feet  -     XR FOOT, COMPLETE (MIN 3 VIEWS), LEFT (CPT=73630); Future  -     XR FOOT, COMPLETE (MIN 3 VIEWS), RIGHT (CPT=73630); Future    Splenomegaly  -     US ABDOMEN COMPLETE (CPT=76700); Future  -     Monoclonal Protein Study; Future  -     Rheumatoid Arthritis Factor; Future    Abnormal SPEP  -     Monoclonal Protein Study; Future  -     Rheumatoid Arthritis Factor; Future    Stage 3b chronic kidney disease (HCC)    Rheumatoid factor positive    Chemotherapy-induced neuropathy (HCC)    Diabetic polyneuropathy associated with type 2 diabetes mellitus (HCC)          Return in about 5 months (around 12/10/2025).      The above plan of care, diagnosis, orders, and follow-up were discussed with the patient. Questions related to this recommended plan of care were answered.    Thank you for referring this delightful patient to me. Please feel free to contact me with any questions.     This report was performed utilizing speech recognition software technology. Despite proofreading, speech recognition errors could escape detection. If a word or phrase is confusing or out of context, please do not hesitate to call for   clarification.       Kind regards      Jude Kenny MD  [facility]

## 2025-07-26 ENCOUNTER — HOSPITAL ENCOUNTER (OUTPATIENT)
Dept: GENERAL RADIOLOGY | Age: 55
Discharge: HOME | End: 2025-07-26
Attending: INTERNAL MEDICINE
Payer: COMMERCIAL

## 2025-07-26 ENCOUNTER — HOSPITAL ENCOUNTER (OUTPATIENT)
Dept: BONE DENSITY | Age: 55
Discharge: HOME | End: 2025-07-26
Attending: INTERNAL MEDICINE
Payer: COMMERCIAL

## 2025-07-26 DIAGNOSIS — D86.9 SARCOID: ICD-10-CM

## 2025-07-26 DIAGNOSIS — N95.1 POST MENOPAUSAL SYNDROME: ICD-10-CM

## 2025-07-26 DIAGNOSIS — M10.9 GOUT, UNSPECIFIED CAUSE, UNSPECIFIED CHRONICITY, UNSPECIFIED SITE: ICD-10-CM

## 2025-07-26 DIAGNOSIS — N20.0 RENAL STONE: ICD-10-CM

## 2025-07-26 DIAGNOSIS — M79.671 PAIN IN BOTH FEET: ICD-10-CM

## 2025-07-26 DIAGNOSIS — M79.672 PAIN IN BOTH FEET: ICD-10-CM

## 2025-07-26 DIAGNOSIS — T14.8XXA BONE FRACTURE: ICD-10-CM

## 2025-07-26 DIAGNOSIS — L92.9 NON-CASEATING GRANULOMA: ICD-10-CM

## 2025-07-26 PROCEDURE — 73630 X-RAY EXAM OF FOOT: CPT | Performed by: INTERNAL MEDICINE

## 2025-07-26 PROCEDURE — 77080 DXA BONE DENSITY AXIAL: CPT | Performed by: INTERNAL MEDICINE

## 2025-07-27 ENCOUNTER — RESULTS FOLLOW-UP (OUTPATIENT)
Dept: RHEUMATOLOGY | Facility: CLINIC | Age: 55
End: 2025-07-27

## 2025-07-27 DIAGNOSIS — R79.89 LOW VITAMIN D LEVEL: ICD-10-CM

## 2025-07-27 DIAGNOSIS — M10.9 GOUT, UNSPECIFIED CAUSE, UNSPECIFIED CHRONICITY, UNSPECIFIED SITE: Primary | ICD-10-CM

## 2025-08-04 ENCOUNTER — LAB ENCOUNTER (OUTPATIENT)
Dept: LAB | Age: 55
End: 2025-08-04
Attending: INTERNAL MEDICINE

## 2025-08-04 DIAGNOSIS — N20.0 RENAL STONE: ICD-10-CM

## 2025-08-04 DIAGNOSIS — M10.9 GOUT, UNSPECIFIED CAUSE, UNSPECIFIED CHRONICITY, UNSPECIFIED SITE: ICD-10-CM

## 2025-08-04 DIAGNOSIS — N95.1 POST MENOPAUSAL SYNDROME: ICD-10-CM

## 2025-08-04 DIAGNOSIS — L92.9 NON-CASEATING GRANULOMA: ICD-10-CM

## 2025-08-04 DIAGNOSIS — R16.1 SPLENOMEGALY: ICD-10-CM

## 2025-08-04 DIAGNOSIS — D86.9 SARCOID: ICD-10-CM

## 2025-08-04 DIAGNOSIS — R77.8 ABNORMAL SPEP: ICD-10-CM

## 2025-08-04 DIAGNOSIS — T14.8XXA BONE FRACTURE: ICD-10-CM

## 2025-08-04 LAB
BASOPHILS # BLD AUTO: 0.03 X10(3) UL (ref 0–0.2)
BASOPHILS NFR BLD AUTO: 0.6 %
BILIRUB UR QL STRIP.AUTO: NEGATIVE
CALCIUM SERPL-MCNC: 2.6 MG/DL (ref 0.5–35.7)
COLOR UR AUTO: YELLOW
CREAT UR-SCNC: 112.2 MG/DL
EOSINOPHIL # BLD AUTO: 0.15 X10(3) UL (ref 0–0.7)
EOSINOPHIL NFR BLD AUTO: 2.9 %
ERYTHROCYTE [DISTWIDTH] IN BLOOD BY AUTOMATED COUNT: 14.7 %
ERYTHROCYTE [SEDIMENTATION RATE] IN BLOOD: 58 MM/HR (ref 0–30)
GLUCOSE UR STRIP.AUTO-MCNC: NORMAL MG/DL
HCT VFR BLD AUTO: 31.5 % (ref 35–48)
HGB BLD-MCNC: 10.4 G/DL (ref 12–16)
IMM GRANULOCYTES # BLD AUTO: 0.02 X10(3) UL (ref 0–1)
IMM GRANULOCYTES NFR BLD: 0.4 %
KETONES UR STRIP.AUTO-MCNC: NEGATIVE MG/DL
LEUKOCYTE ESTERASE UR QL STRIP.AUTO: 500
LYMPHOCYTES # BLD AUTO: 0.77 X10(3) UL (ref 1–4)
LYMPHOCYTES NFR BLD AUTO: 15.1 %
MCH RBC QN AUTO: 30.4 PG (ref 26–34)
MCHC RBC AUTO-ENTMCNC: 33 G/DL (ref 31–37)
MCV RBC AUTO: 92.1 FL (ref 80–100)
MONOCYTES # BLD AUTO: 0.43 X10(3) UL (ref 0.1–1)
MONOCYTES NFR BLD AUTO: 8.4 %
NEUTROPHILS # BLD AUTO: 3.69 X10 (3) UL (ref 1.5–7.7)
NEUTROPHILS # BLD AUTO: 3.69 X10(3) UL (ref 1.5–7.7)
NEUTROPHILS NFR BLD AUTO: 72.6 %
NITRITE UR QL STRIP.AUTO: NEGATIVE
PH UR STRIP.AUTO: 5.5 (ref 5–8)
PLATELET # BLD AUTO: 148 10(3)UL (ref 150–450)
PROT UR STRIP.AUTO-MCNC: 50 MG/DL
RBC # BLD AUTO: 3.42 X10(6)UL (ref 3.8–5.3)
RBC #/AREA URNS AUTO: >10 /HPF
SP GR UR STRIP.AUTO: 1.02 (ref 1–1.03)
UROBILINOGEN UR STRIP.AUTO-MCNC: NORMAL MG/DL
WBC # BLD AUTO: 5.1 X10(3) UL (ref 4–11)
WBC #/AREA URNS AUTO: >50 /HPF

## 2025-08-04 PROCEDURE — 82340 ASSAY OF CALCIUM IN URINE: CPT

## 2025-08-04 PROCEDURE — 82652 VIT D 1 25-DIHYDROXY: CPT

## 2025-08-04 PROCEDURE — 83735 ASSAY OF MAGNESIUM: CPT

## 2025-08-04 PROCEDURE — 86038 ANTINUCLEAR ANTIBODIES: CPT

## 2025-08-04 PROCEDURE — 87086 URINE CULTURE/COLONY COUNT: CPT

## 2025-08-04 PROCEDURE — 83970 ASSAY OF PARATHORMONE: CPT

## 2025-08-04 PROCEDURE — 86140 C-REACTIVE PROTEIN: CPT

## 2025-08-04 PROCEDURE — 85025 COMPLETE CBC W/AUTO DIFF WBC: CPT

## 2025-08-04 PROCEDURE — 87077 CULTURE AEROBIC IDENTIFY: CPT

## 2025-08-04 PROCEDURE — 80053 COMPREHEN METABOLIC PANEL: CPT

## 2025-08-04 PROCEDURE — 84550 ASSAY OF BLOOD/URIC ACID: CPT

## 2025-08-04 PROCEDURE — 86334 IMMUNOFIX E-PHORESIS SERUM: CPT

## 2025-08-04 PROCEDURE — 87186 SC STD MICRODIL/AGAR DIL: CPT

## 2025-08-04 PROCEDURE — 86225 DNA ANTIBODY NATIVE: CPT

## 2025-08-04 PROCEDURE — 84100 ASSAY OF PHOSPHORUS: CPT

## 2025-08-04 PROCEDURE — 84165 PROTEIN E-PHORESIS SERUM: CPT

## 2025-08-04 PROCEDURE — 81001 URINALYSIS AUTO W/SCOPE: CPT

## 2025-08-04 PROCEDURE — 82306 VITAMIN D 25 HYDROXY: CPT

## 2025-08-04 PROCEDURE — 83521 IG LIGHT CHAINS FREE EACH: CPT

## 2025-08-04 PROCEDURE — 85652 RBC SED RATE AUTOMATED: CPT

## 2025-08-04 PROCEDURE — 36415 COLL VENOUS BLD VENIPUNCTURE: CPT

## 2025-08-04 PROCEDURE — 86431 RHEUMATOID FACTOR QUANT: CPT

## 2025-08-04 PROCEDURE — 82570 ASSAY OF URINE CREATININE: CPT

## 2025-08-05 LAB
ALBUMIN SERPL-MCNC: 4.5 G/DL (ref 3.2–4.8)
ALBUMIN/GLOB SERPL: 1.3 (ref 1–2)
ALP LIVER SERPL-CCNC: 91 U/L (ref 46–118)
ALT SERPL-CCNC: 18 U/L (ref 10–49)
ANION GAP SERPL CALC-SCNC: 7 MMOL/L (ref 0–18)
AST SERPL-CCNC: 21 U/L (ref ?–34)
BILIRUB SERPL-MCNC: 0.3 MG/DL (ref 0.3–1.2)
BUN BLD-MCNC: 23 MG/DL (ref 9–23)
CALCIUM BLD-MCNC: 10 MG/DL (ref 8.7–10.6)
CHLORIDE SERPL-SCNC: 105 MMOL/L (ref 98–112)
CO2 SERPL-SCNC: 29 MMOL/L (ref 21–32)
CREAT BLD-MCNC: 1.79 MG/DL (ref 0.55–1.02)
CRP SERPL-MCNC: <0.5 MG/DL (ref ?–0.5)
DSDNA IGG SERPL IA-ACNC: 3.3 IU/ML (ref ?–10)
EGFRCR SERPLBLD CKD-EPI 2021: 33 ML/MIN/1.73M2 (ref 60–?)
ENA AB SER QL IA: 0.5 UG/L (ref ?–0.7)
ENA AB SER QL IA: NEGATIVE
FASTING STATUS PATIENT QL REPORTED: NO
GLOBULIN PLAS-MCNC: 3.6 G/DL (ref 2–3.5)
GLUCOSE BLD-MCNC: 164 MG/DL (ref 70–99)
MAGNESIUM SERPL-MCNC: 1.5 MG/DL (ref 1.6–2.6)
OSMOLALITY SERPL CALC.SUM OF ELEC: 299 MOSM/KG (ref 275–295)
PHOSPHATE SERPL-MCNC: 3.6 MG/DL (ref 2.4–5.1)
POTASSIUM SERPL-SCNC: 4.8 MMOL/L (ref 3.5–5.1)
PROT SERPL-MCNC: 8.1 G/DL (ref 5.7–8.2)
PTH-INTACT SERPL-MCNC: 37.1 PG/ML (ref 18.5–88)
RHEUMATOID FACT SERPL-ACNC: 13.7 IU/ML (ref ?–14)
SODIUM SERPL-SCNC: 141 MMOL/L (ref 136–145)
URATE SERPL-MCNC: 6 MG/DL (ref 3.1–7.8)
VIT D+METAB SERPL-MCNC: 20.9 NG/ML (ref 30–100)

## 2025-08-06 ENCOUNTER — TELEPHONE (OUTPATIENT)
Facility: CLINIC | Age: 55
End: 2025-08-06

## 2025-08-06 DIAGNOSIS — N39.0 URINARY TRACT INFECTION WITHOUT HEMATURIA, SITE UNSPECIFIED: Primary | ICD-10-CM

## 2025-08-06 RX ORDER — GRANULES FOR ORAL 3 G/1
3 POWDER ORAL ONCE
Qty: 3 G | Refills: 0 | Status: SHIPPED | OUTPATIENT
Start: 2025-08-06 | End: 2025-08-06

## 2025-08-07 LAB
ALBUMIN SERPL ELPH-MCNC: 3.89 G/DL (ref 3.75–5.21)
ALBUMIN/GLOB SERPL: 1.05 (ref 1–2)
ALPHA1 GLOB SERPL ELPH-MCNC: 0.32 G/DL (ref 0.19–0.46)
ALPHA2 GLOB SERPL ELPH-MCNC: 0.93 G/DL (ref 0.48–1.05)
B-GLOBULIN SERPL ELPH-MCNC: 1.2 G/DL (ref 0.68–1.23)
GAMMA GLOB SERPL ELPH-MCNC: 1.25 G/DL (ref 0.62–1.7)
KAPPA LC FREE SER-MCNC: 6.04 MG/DL (ref 0.33–1.94)
KAPPA LC FREE/LAMBDA FREE SER NEPH: 1.31 (ref 0.26–1.65)
LAMBDA LC FREE SERPL-MCNC: 4.62 MG/DL (ref 0.57–2.63)
PROT SERPL-MCNC: 7.6 G/DL (ref 5.7–8.2)

## 2025-08-08 LAB — VIT D 1,25 DI-OH: 15.4 PG/ML

## 2025-08-10 RX ORDER — ALLOPURINOL 100 MG/1
TABLET ORAL
Qty: 495 TABLET | Refills: 1 | Status: SHIPPED | OUTPATIENT
Start: 2025-08-10 | End: 2026-02-06

## 2025-08-11 ENCOUNTER — TELEPHONE (OUTPATIENT)
Dept: SURGERY | Facility: CLINIC | Age: 55
End: 2025-08-11

## 2025-08-12 ENCOUNTER — LAB ENCOUNTER (OUTPATIENT)
Dept: LAB | Age: 55
End: 2025-08-12
Attending: PHYSICIAN ASSISTANT

## 2025-08-12 DIAGNOSIS — N39.0 RECURRENT UTI: ICD-10-CM

## 2025-08-12 LAB
BILIRUB UR QL STRIP.AUTO: NEGATIVE
CLARITY UR REFRACT.AUTO: CLEAR
GLUCOSE UR STRIP.AUTO-MCNC: NORMAL MG/DL
KETONES UR STRIP.AUTO-MCNC: NEGATIVE MG/DL
LEUKOCYTE ESTERASE UR QL STRIP.AUTO: NEGATIVE
NITRITE UR QL STRIP.AUTO: NEGATIVE
PH UR STRIP.AUTO: 5.5 (ref 5–8)
PROT UR STRIP.AUTO-MCNC: 20 MG/DL
RBC #/AREA URNS AUTO: >10 /HPF
SP GR UR STRIP.AUTO: 1.02 (ref 1–1.03)
UROBILINOGEN UR STRIP.AUTO-MCNC: NORMAL MG/DL

## 2025-08-12 PROCEDURE — 81001 URINALYSIS AUTO W/SCOPE: CPT

## 2025-08-22 ENCOUNTER — HOSPITAL ENCOUNTER (OUTPATIENT)
Dept: ULTRASOUND IMAGING | Age: 55
Discharge: HOME | End: 2025-08-22
Attending: INTERNAL MEDICINE

## 2025-08-22 DIAGNOSIS — K74.60 HEPATIC CIRRHOSIS, UNSPECIFIED HEPATIC CIRRHOSIS TYPE, UNSPECIFIED WHETHER ASCITES PRESENT (HCC): ICD-10-CM

## 2025-08-22 DIAGNOSIS — R16.1 SPLENOMEGALY: ICD-10-CM

## 2025-08-22 DIAGNOSIS — L92.9 NON-CASEATING GRANULOMA: ICD-10-CM

## 2025-08-22 DIAGNOSIS — N20.0 RENAL STONE: ICD-10-CM

## 2025-08-22 DIAGNOSIS — D86.9 SARCOID: ICD-10-CM

## 2025-08-22 DIAGNOSIS — N95.1 POST MENOPAUSAL SYNDROME: ICD-10-CM

## 2025-08-22 PROCEDURE — 76700 US EXAM ABDOM COMPLETE: CPT | Performed by: INTERNAL MEDICINE

## (undated) DEVICE — FLUIDGARD® 160 ANTI-FOG SURGICAL MASK WITH ANTI-GLARE SHIELD: Brand: PRECEPT ®

## (undated) DEVICE — KENDALL SCD EXPRESS SLEEVES, KNEE LENGTH, MEDIUM: Brand: KENDALL SCD

## (undated) DEVICE — 1200CC GUARDIAN II: Brand: GUARDIAN

## (undated) DEVICE — SINGLE USE SUCTION VALVE MAJ-209: Brand: SINGLE USE SUCTION VALVE (STERILE)

## (undated) DEVICE — NEEDLE ASPIR 22GA L40MM WRK L70CM SYR VLV

## (undated) DEVICE — MEDI-VAC NON-CONDUCTIVE SUCTION TUBING: Brand: CARDINAL HEALTH

## (undated) DEVICE — SINGLE USE BIOPSY VALVE MAJ-210: Brand: SINGLE USE BIOPSY VALVE (STERILE)

## (undated) DEVICE — 60 ML SYRINGE REGULAR TIP: Brand: MONOJECT

## (undated) DEVICE — STERIS KITS

## (undated) DEVICE — MEDI-VAC SUCTION HANDLE REGULAR CAPACITY: Brand: CARDINAL HEALTH

## (undated) DEVICE — PLASTIC MEDIUM 16 OZ GRADUATE

## (undated) DEVICE — TRAP,MUCUS SPECIMEN, 80CC: Brand: MEDLINE

## (undated) DEVICE — 3M™ RED DOT™ MONITORING ELECTRODE WITH FOAM TAPE AND STICKY GEL, 50/BAG, 20/CASE, 72/PLT 2570: Brand: RED DOT™

## (undated) DEVICE — SYRINGE MED 10ML SLIP TIP CLR BRL TAPR PLUNG

## (undated) NOTE — LETTER
[address], [address] [facility], [address] [facility]  [phone]            October 6, 2021      Estrellita Hough  [address] [place] [id number]      Dear Ms. Caballero

## (undated) NOTE — MR AVS SNAPSHOT
After Visit Summary   4/24/2017    Carissa Mendiola    MRN: [id number]           Diagnoses this Visit     Urinary tract infection symptoms    -  Primary     Malignant neoplasm of overlapping sites of right breast in female, estrogen receptor positiv cholecalciferol 1000 UNITS Oral Cap Take 2,000 Units by mouth daily. Aspirin-Acetaminophen-Caffeine (EXCEDRIN MIGRAINE OR) Take 1 tablet by mouth daily as needed.       ATORVASTATIN CALCIUM 10 MG Oral Tab TAKE ONE TABLET BY MOUTH EVERY OTHER DAY    MIHAI Result Summary for CBC WITH DIFFERENTIAL WITH PLATELET      Narrative     The following orders were created for panel order CBC WITH DIFFERENTIAL WITH PLATELET.   Procedure                               Abnormality         Status                     - Midcycle       63.9- 356.7 pg/mL   Luteal         55.8- 214.2 pg/mL   Postmenopausal 0.0- 32.2 pg/mL             Result Summary for [facility]      Component Results     Component Value Flag Ref Range Units Status    FSH 50.9   mIU/mL Final    Comment:       Prep Immature Granulocyte % 0.9   % Final         Result Summary for URINALYSIS WITH CULTURE REFLEX      Component Results     Component Value Flag Ref Range Units Status    Urine Color Yellow  Yellow   Final    Clarity Urine Cloudy (A) Clear   Final    Spec G

## (undated) NOTE — Clinical Note
Melba Prucell was referred by her  and seen at the [facility] in consultation today via video visit. I have ordered labs, set up a nutrition consultation with our dietician.   Her goals are to improve health, reduce medication

## (undated) NOTE — LETTER
Date: 1/16/2020    Patient Name: Nichelle Coker          To Whom it may concern: This letter has been written at the patient's request. The above patient was seen at the [facility] for treatment of a medical condition.     This patient sh

## (undated) NOTE — LETTER
Date: 2/21/2020    Patient Name: Nichelle Coker          To Whom it may concern: This letter has been written at the patient's request. The above patient was seen at the [facility] for treatment of a medical condition.     This patient sh